# Patient Record
Sex: MALE | Race: WHITE | NOT HISPANIC OR LATINO | Employment: UNEMPLOYED | URBAN - METROPOLITAN AREA
[De-identification: names, ages, dates, MRNs, and addresses within clinical notes are randomized per-mention and may not be internally consistent; named-entity substitution may affect disease eponyms.]

---

## 2018-06-25 ENCOUNTER — OFFICE VISIT (OUTPATIENT)
Dept: URGENT CARE | Facility: CLINIC | Age: 43
End: 2018-06-25
Payer: COMMERCIAL

## 2018-06-25 VITALS
HEART RATE: 107 BPM | HEIGHT: 69 IN | OXYGEN SATURATION: 94 % | SYSTOLIC BLOOD PRESSURE: 140 MMHG | RESPIRATION RATE: 18 BRPM | BODY MASS INDEX: 28.32 KG/M2 | TEMPERATURE: 98.8 F | WEIGHT: 191.2 LBS | DIASTOLIC BLOOD PRESSURE: 78 MMHG

## 2018-06-25 DIAGNOSIS — H65.01 RIGHT ACUTE SEROUS OTITIS MEDIA, RECURRENCE NOT SPECIFIED: Primary | ICD-10-CM

## 2018-06-25 PROCEDURE — S9088 SERVICES PROVIDED IN URGENT: HCPCS | Performed by: PHYSICIAN ASSISTANT

## 2018-06-25 PROCEDURE — 99213 OFFICE O/P EST LOW 20 MIN: CPT | Performed by: PHYSICIAN ASSISTANT

## 2018-06-25 RX ORDER — TRAZODONE HYDROCHLORIDE 50 MG/1
50 TABLET ORAL
COMMUNITY
End: 2019-11-06

## 2018-06-25 RX ORDER — BENZONATATE 100 MG/1
100 CAPSULE ORAL 3 TIMES DAILY PRN
Qty: 30 CAPSULE | Refills: 0 | Status: SHIPPED | OUTPATIENT
Start: 2018-06-25 | End: 2019-05-10

## 2018-06-25 RX ORDER — AMOXICILLIN 500 MG/1
500 TABLET, FILM COATED ORAL 3 TIMES DAILY
Qty: 21 TABLET | Refills: 0 | Status: SHIPPED | OUTPATIENT
Start: 2018-06-25 | End: 2018-07-02

## 2018-06-25 RX ORDER — FLUTICASONE PROPIONATE 50 MCG
1 SPRAY, SUSPENSION (ML) NASAL DAILY
Qty: 16 G | Refills: 0 | Status: SHIPPED | OUTPATIENT
Start: 2018-06-25 | End: 2019-05-10

## 2018-06-25 NOTE — PROGRESS NOTES
[facility]        NAME: Kim Cabezas is a 36 y o  male  : 1975    MRN: [id number]  DATE: 2018  TIME: 9:25 AM    Assessment and Plan   Right acute serous otitis media, recurrence not specified [H65 01]  1  Right acute serous otitis media, recurrence not specified  benzonatate (TESSALON PERLES) 100 mg capsule    fluticasone (FLONASE) 50 mcg/act nasal spray    amoxicillin (AMOXIL) 500 MG tablet         Patient Instructions     Sudafed otc  Head down and aim out for nasal spray  Follow up with PCP in 3-5 days  Proceed to  ER if symptoms worsen  Chief Complaint     Chief Complaint   Patient presents with    Cough     Pt has cough and congestion x 3 days  Taking Claritin and Benndryl  History of Present Illness       36year-old male complains of 3 days of productive cough  He has right ear pain and sore throat  No fever but chills at home  No chest pain or shortness of breath  He does take Claritin and Benadryl over-the-counter for the symptoms with minimal help  Has not seen any other providers for this  No history of asthma or allergies  No smoking          Review of Systems   Review of Systems      Current Medications       Current Outpatient Prescriptions:     traZODone (DESYREL) 50 mg tablet, Take 50 mg by mouth daily at bedtime, Disp: , Rfl:     amoxicillin (AMOXIL) 500 MG tablet, Take 1 tablet (500 mg total) by mouth 3 (three) times a day for 7 days, Disp: 21 tablet, Rfl: 0    benzonatate (TESSALON PERLES) 100 mg capsule, Take 1 capsule (100 mg total) by mouth 3 (three) times a day as needed for cough, Disp: 30 capsule, Rfl: 0    fluticasone (FLONASE) 50 mcg/act nasal spray, 1 spray into each nostril daily, Disp: 16 g, Rfl: 0    Current Allergies     Allergies as of 2018    (No Known Allergies)            The following portions of the patient's history were reviewed and updated as appropriate: allergies, current medications, past family history, past medical history, past social history, past surgical history and problem list      Past Medical History:   Diagnosis Date    Infectious viral hepatitis        History reviewed  No pertinent surgical history  Family History   Problem Relation Age of Onset    Adopted: Yes    No Known Problems Mother     No Known Problems Father          Medications have been verified  Objective   /78 (BP Location: Left arm, Patient Position: Sitting)   Pulse (!) 107   Temp 98 8 °F (37 1 °C) (Temporal)   Resp 18   Ht 5' 9" (1 753 m)   Wt 86 7 kg (191 lb 3 2 oz)   SpO2 94%   BMI 28 24 kg/m²        Physical Exam     Physical Exam   Constitutional: He appears well-developed and well-nourished  No distress  HENT:   Right Ear: External ear and ear canal normal  Tympanic membrane is erythematous and bulging  A middle ear effusion is present  Left Ear: Tympanic membrane, external ear and ear canal normal  Tympanic membrane is not erythematous and not bulging  No middle ear effusion  Nose: Nose normal  Right sinus exhibits no maxillary sinus tenderness and no frontal sinus tenderness  Left sinus exhibits no maxillary sinus tenderness and no frontal sinus tenderness  Mouth/Throat: Posterior oropharyngeal erythema present  No oropharyngeal exudate, posterior oropharyngeal edema or tonsillar abscesses  Eyes: Conjunctivae and EOM are normal  Pupils are equal, round, and reactive to light  No scleral icterus  Neck: Normal range of motion  Neck supple  Cardiovascular: Normal rate, regular rhythm and normal heart sounds  Pulmonary/Chest: Effort normal and breath sounds normal  No respiratory distress  He has no wheezes  He has no rales  Abdominal: Soft  Bowel sounds are normal  He exhibits no distension and no mass  There is no tenderness  There is no rebound and no guarding  Lymphadenopathy:     He has no cervical adenopathy  Skin: Skin is warm and dry  No rash noted

## 2018-06-25 NOTE — PATIENT INSTRUCTIONS
Sudafed otc  Head down and aim out for nasal spray  Follow up with PCP in 3-5 days  Proceed to  ER if symptoms worsen

## 2019-05-10 ENCOUNTER — OFFICE VISIT (OUTPATIENT)
Dept: URGENT CARE | Facility: CLINIC | Age: 44
End: 2019-05-10
Payer: COMMERCIAL

## 2019-05-10 ENCOUNTER — APPOINTMENT (OUTPATIENT)
Dept: RADIOLOGY | Facility: CLINIC | Age: 44
End: 2019-05-10
Payer: COMMERCIAL

## 2019-05-10 VITALS
RESPIRATION RATE: 16 BRPM | WEIGHT: 192 LBS | OXYGEN SATURATION: 97 % | BODY MASS INDEX: 28.44 KG/M2 | HEIGHT: 69 IN | DIASTOLIC BLOOD PRESSURE: 80 MMHG | HEART RATE: 85 BPM | SYSTOLIC BLOOD PRESSURE: 148 MMHG | TEMPERATURE: 97.9 F

## 2019-05-10 DIAGNOSIS — M25.522 LEFT ELBOW PAIN: Primary | ICD-10-CM

## 2019-05-10 DIAGNOSIS — M25.522 LEFT ELBOW PAIN: ICD-10-CM

## 2019-05-10 PROCEDURE — 73070 X-RAY EXAM OF ELBOW: CPT

## 2019-05-10 PROCEDURE — 99213 OFFICE O/P EST LOW 20 MIN: CPT | Performed by: PHYSICIAN ASSISTANT

## 2019-05-10 PROCEDURE — S9088 SERVICES PROVIDED IN URGENT: HCPCS | Performed by: PHYSICIAN ASSISTANT

## 2019-05-10 RX ORDER — NAPROXEN 500 MG/1
500 TABLET ORAL 2 TIMES DAILY WITH MEALS
Qty: 14 TABLET | Refills: 0 | Status: SHIPPED | OUTPATIENT
Start: 2019-05-10 | End: 2019-11-14

## 2019-11-06 ENCOUNTER — OFFICE VISIT (OUTPATIENT)
Dept: FAMILY MEDICINE CLINIC | Facility: CLINIC | Age: 44
End: 2019-11-06
Payer: COMMERCIAL

## 2019-11-06 ENCOUNTER — TELEPHONE (OUTPATIENT)
Dept: FAMILY MEDICINE CLINIC | Facility: CLINIC | Age: 44
End: 2019-11-06

## 2019-11-06 VITALS
HEART RATE: 88 BPM | DIASTOLIC BLOOD PRESSURE: 66 MMHG | WEIGHT: 173 LBS | SYSTOLIC BLOOD PRESSURE: 116 MMHG | OXYGEN SATURATION: 98 % | BODY MASS INDEX: 25.62 KG/M2 | HEIGHT: 69 IN | RESPIRATION RATE: 18 BRPM

## 2019-11-06 DIAGNOSIS — L81.8 TATTOO OF SKIN: ICD-10-CM

## 2019-11-06 DIAGNOSIS — Z76.89 ENCOUNTER TO ESTABLISH CARE: Primary | ICD-10-CM

## 2019-11-06 DIAGNOSIS — F12.90 MARIJUANA USE: ICD-10-CM

## 2019-11-06 DIAGNOSIS — Z13.220 LIPID SCREENING: ICD-10-CM

## 2019-11-06 DIAGNOSIS — K62.5 RECTAL BLEEDING: ICD-10-CM

## 2019-11-06 DIAGNOSIS — R04.0 EPISTAXIS: ICD-10-CM

## 2019-11-06 DIAGNOSIS — F10.10 ALCOHOL ABUSE, DAILY USE: ICD-10-CM

## 2019-11-06 DIAGNOSIS — B19.20 HEPATITIS C VIRUS INFECTION WITHOUT HEPATIC COMA, UNSPECIFIED CHRONICITY: ICD-10-CM

## 2019-11-06 DIAGNOSIS — G89.29 CHRONIC BILATERAL LOW BACK PAIN WITH BILATERAL SCIATICA: ICD-10-CM

## 2019-11-06 DIAGNOSIS — M54.41 CHRONIC BILATERAL LOW BACK PAIN WITH BILATERAL SCIATICA: ICD-10-CM

## 2019-11-06 DIAGNOSIS — M54.42 CHRONIC BILATERAL LOW BACK PAIN WITH BILATERAL SCIATICA: ICD-10-CM

## 2019-11-06 DIAGNOSIS — F41.8 DEPRESSION WITH ANXIETY: ICD-10-CM

## 2019-11-06 PROCEDURE — 3008F BODY MASS INDEX DOCD: CPT | Performed by: NURSE PRACTITIONER

## 2019-11-06 PROCEDURE — 99204 OFFICE O/P NEW MOD 45 MIN: CPT | Performed by: NURSE PRACTITIONER

## 2019-11-06 RX ORDER — TRAZODONE HYDROCHLORIDE 150 MG/1
150 TABLET ORAL
Refills: 0 | COMMUNITY
Start: 2019-10-18

## 2019-11-06 RX ORDER — CLONAZEPAM 0.5 MG/1
0.5 TABLET ORAL DAILY PRN
Refills: 2 | COMMUNITY
Start: 2019-10-18

## 2019-11-06 NOTE — PATIENT INSTRUCTIONS
Establish visit  Prior Dx of Hep C- no tx  Obtain labs  Refer to GI  Rectal bleeding- Refer to DR [facility]  Avoid alcohol  Epistaxis- refer to ENT  Acute on chronic back pain- avoid nsaids such as advil, ibuprofen etc  May take extra strength tylenol  Advised to stop suboxone as given by a friend  Obtain labs  Out office will call with results  Follow up with [facility] as ordered  Follow up in this office in 2 months  Weight Management   AMBULATORY CARE:   Why it is important to manage your weight:  Being overweight increases your risk of health conditions such as heart disease, high blood pressure, type 2 diabetes, and certain types of cancer  It can also increase your risk for osteoarthritis, sleep apnea, and other respiratory problems  Aim for a slow, steady weight loss  Even a small amount of weight loss can lower your risk of health problems  How to lose weight safely:  A safe and healthy way to lose weight is to eat fewer calories and get regular exercise  You can lose up about 1 pound a week by decreasing the number of calories you eat by 500 calories each day  You can decrease calories by eating smaller portion sizes or by cutting out high-calorie foods  Read labels to find out how many calories are in the foods you eat  You can also burn calories with exercise such as walking, swimming, or biking  You will be more likely to keep weight off if you make these changes part of your lifestyle  Healthy meal plan for weight management:  A healthy meal plan includes a variety of foods, contains fewer calories, and helps you stay healthy  A healthy meal plan includes the following:  · Eat whole-grain foods more often  A healthy meal plan should contain fiber  Fiber is the part of grains, fruits, and vegetables that is not broken down by your body  Whole-grain foods are healthy and provide extra fiber in your diet   Some examples of whole-grain foods are whole-wheat breads and pastas, oatmeal, brown rice, and bulgur  · Eat a variety of vegetables every day  Include dark, leafy greens such as spinach, kale, jose greens, and mustard greens  Eat yellow and orange vegetables such as carrots, sweet potatoes, and winter squash  · Eat a variety of fruits every day  Choose fresh or canned fruit (canned in its own juice or light syrup) instead of juice  Fruit juice has very little or no fiber  · Eat low-fat dairy foods  Drink fat-free (skim) milk or 1% milk  Eat fat-free yogurt and low-fat cottage cheese  Try low-fat cheeses such as mozzarella and other reduced-fat cheeses  · Choose meat and other protein foods that are low in fat  Choose beans or other legumes such as split peas or lentils  Choose fish, skinless poultry (chicken or turkey), or lean cuts of red meat (beef or pork)  Before you cook meat or poultry, cut off any visible fat  · Use less fat and oil  Try baking foods instead of frying them  Add less fat, such as margarine, sour cream, regular salad dressing and mayonnaise to foods  Eat fewer high-fat foods  Some examples of high-fat foods include french fries, doughnuts, ice cream, and cakes  · Eat fewer sweets  Limit foods and drinks that are high in sugar  This includes candy, cookies, regular soda, and sweetened drinks  Ways to decrease calories:   · Eat smaller portions  ¨ Use a small plate with smaller servings  ¨ Do not eat second helpings  ¨ When you eat at a restaurant, ask for a box and place half of your meal in the box before you eat  ¨ Share an entrée with someone else  · Replace high-calorie snacks with healthy, low-calorie snacks  ¨ Choose fresh fruit, vegetables, fat-free rice cakes, or air-popped popcorn instead of potato chips, nuts, or chocolate  ¨ Choose water or calorie-free drinks instead of soda or sweetened drinks  · Eat regular meals  Skipping meals can lead to overeating later in the day   Eat a healthy snack in place of a meal if you do not have time to eat a regular meal      · Do not shop for groceries when you are hungry  You may be more likely to make unhealthy food choices  Take a grocery list of healthy foods and shop after you have eaten  Exercise:  Exercise at least 30 minutes per day on most days of the week  Some examples of exercise include walking, biking, dancing, and swimming  You can also fit in more physical activity by taking the stairs instead of the elevator or parking farther away from stores  Ask your healthcare provider about the best exercise plan for you  Other things to consider as you try to lose weight:   · Be aware of situations that may give you the urge to overeat, such as eating while watching television  Find ways to avoid these situations  For example, read a book, go for a walk, or do crafts  · Meet with a weight loss support group or friends who are also trying to lose weight  This may help you stay motivated to continue working on your weight loss goals  © 2017 [employer] Information is for End User's use only and may not be sold, redistributed or otherwise used for commercial purposes  All illustrations and images included in CareNotes® are the copyrighted property of [employer]  or [employer]  The above information is an  only  It is not intended as medical advice for individual conditions or treatments  Talk to your doctor, nurse or pharmacist before following any medical regimen to see if it is safe and effective for you  Low Fat Diet   AMBULATORY CARE:   A low-fat diet  is an eating plan that is low in total fat, unhealthy fat, and cholesterol  You may need to follow a low-fat diet if you have trouble digesting or absorbing fat  You may also need to follow this diet if you have high cholesterol  You can also lower your cholesterol by increasing the amount of fiber in your diet   Soluble fiber is a type of fiber that helps to decrease cholesterol levels  Different types of fat in food:   · Limit unhealthy fats  A diet that is high in cholesterol, saturated fat, and trans fat may cause unhealthy cholesterol levels  Unhealthy cholesterol levels increase your risk of heart disease  ¨ Cholesterol:  Limit intake of cholesterol to less than 200 mg per day  Cholesterol is found in meat, eggs, and dairy  ¨ Saturated fat:  Limit saturated fat to less than 7% of your total daily calories  Ask your dietitian how many calories you need each day  Saturated fat is found in butter, cheese, ice cream, whole milk, and palm oil  Saturated fat is also found in meat, such as beef, pork, chicken skin, and processed meats  Processed meats include sausage, hot dogs, and bologna  ¨ Trans fat:  Avoid trans fat as much as possible  Trans fat is used in fried and baked foods  Foods that say trans fat free on the label may still have up to 0 5 grams of trans fat per serving  · Include healthy fats  Replace foods that are high in saturated and trans fat with foods high in healthy fats  This may help to decrease high cholesterol levels  ¨ Monounsaturated fats: These are found in avocados, nuts, and vegetable oils, such as olive, canola, and sunflower oil  ¨ Polyunsaturated fats: These can be found in vegetable oils, such as soybean or corn oil  Omega-3 fats can help to decrease the risk of heart disease  Omega-3 fats are found in fish, such as salmon, herring, trout, and tuna  Omega-3 fats can also be found in plant foods, such as walnuts, flaxseed, soybeans, and canola oil    Foods to limit or avoid:   · Grains:      ¨ Snacks that are made with partially hydrogenated oils, such as chips, regular crackers, and butter-flavored popcorn    ¨ High-fat baked goods, such as biscuits, croissants, doughnuts, pies, cookies, and pastries    · Dairy:      ¨ Whole milk, 2% milk, and yogurt and ice cream made with whole milk    ¨ Half and half creamer, heavy cream, and whipping cream    ¨ Cheese, cream cheese, and sour cream    · Meats and proteins:      ¨ High-fat cuts of meat (T-bone steak, regular hamburger, and ribs)    ¨ Fried meat, poultry (turkey and chicken), and fish    ¨ Poultry (chicken and turkey) with skin    ¨ Cold cuts (salami or bologna), hot dogs, carmona, and sausage    ¨ Whole eggs and egg yolks    · Vegetables and fruits with added fat:      ¨ Fried vegetables or vegetables in butter or high-fat sauces, such as cream or cheese sauces    ¨ Fried fruit or fruit served with butter or cream    · Fats:      ¨ Butter, stick margarine, and shortening    ¨ Coconut, palm oil, and palm kernel oil  Foods to include:   · Grains:      ¨ Whole-grain breads, cereals, pasta, and brown rice    ¨ Low-fat crackers and pretzels    · Vegetables and fruits:      ¨ Fresh, frozen, or canned vegetables (no salt or low-sodium)    ¨ Fresh, frozen, dried, or canned fruit (canned in light syrup or fruit juice)    ¨ Avocado    · Low-fat dairy products:      ¨ Nonfat (skim) or 1% milk    ¨ Nonfat or low-fat cheese, yogurt, and cottage cheese    · Meats and proteins:      ¨ Chicken or turkey with no skin    ¨ Baked or broiled fish    ¨ Lean beef and pork (loin, round, extra lean hamburger)    ¨ Beans and peas, unsalted nuts, soy products    ¨ Egg whites and substitutes    ¨ Seeds and nuts    · Fats:      ¨ Unsaturated oil, such as canola, olive, peanut, soybean, or sunflower oil    ¨ Soft or liquid margarine and vegetable oil spread    ¨ Low-fat salad dressing  Other ways to decrease fat:   · Read food labels before you buy foods  Choose foods that have less than 30% of calories from fat  Choose low-fat or fat-free dairy products  Remember that fat free does not mean calorie free  These foods still contain calories, and too many calories can lead to weight gain  · Trim fat from meat and avoid fried food  Trim all visible fat from meat before you cook it  Remove the skin from poultry   Do not avendaño meat, fish, or poultry  Bake, roast, boil, or broil these foods instead  Avoid fried foods  Eat a baked potato instead of [place] fries  Steam vegetables instead of sautéing them in butter  · Add less fat to foods  Use imitation carmona bits on salads and baked potatoes instead of regular carmona bits  Use fat-free or low-fat salad dressings instead of regular dressings  Use low-fat or nonfat butter-flavored topping instead of regular butter or margarine on popcorn and other foods  Ways to decrease fat in recipes:  Replace high-fat ingredients with low-fat or nonfat ones  This may cause baked goods to be drier than usual  You may need to use nonfat cooking spray on pans to prevent food from sticking  You also may need to change the amount of other ingredients, such as water, in the recipe  Try the following:  · Use low-fat or light margarine instead of regular margarine or shortening  · Use lean ground turkey breast or chicken, or lean ground beef (less than 5% fat) instead of hamburger  · Add 1 teaspoon of canola oil to 8 ounces of skim milk instead of using cream or half and half  · Use grated zucchini, carrots, or apples in breads instead of coconut  · Use blenderized, low-fat cottage cheese, plain tofu, or low-fat ricotta cheese instead of cream cheese  · Use 1 egg white and 1 teaspoon of canola oil, or use ¼ cup (2 ounces) of fat-free egg substitute instead of a whole egg  · Replace half of the oil that is called for in a recipe with applesauce when you bake  Use 3 tablespoons of cocoa powder and 1 tablespoon of canola oil instead of a square of baking chocolate  How to increase fiber:  Eat enough high-fiber foods to get 20 to 30 grams of fiber every day  Slowly increase your fiber intake to avoid stomach cramps, gas, and other problems  · Eat 3 ounces of whole-grain foods each day  An ounce is about 1 slice of bread  Eat whole-grain breads, such as whole-wheat bread   Whole wheat, whole-wheat flour, or other whole grains should be listed as the first ingredient on the food label  Replace white flour with whole-grain flour or use half of each in recipes  Whole-grain flour is heavier than white flour, so you may have to add more yeast or baking powder  · Eat a high-fiber cereal for breakfast   Oatmeal is a good source of soluble fiber  Look for cereals that have bran or fiber in the name  Choose whole-grain products, such as brown rice, barley, and whole-wheat pasta  · Eat more beans, peas, and lentils  For example, add beans to soups or salads  Eat at least 5 cups of fruits and vegetables each day  Eat fruits and vegetables with the peel because the peel is high in fiber  © 2017 [employer] Information is for End User's use only and may not be sold, redistributed or otherwise used for commercial purposes  All illustrations and images included in CareNotes® are the copyrighted property of [employer]  or [employer]  The above information is an  only  It is not intended as medical advice for individual conditions or treatments  Talk to your doctor, nurse or pharmacist before following any medical regimen to see if it is safe and effective for you  Heart Healthy Diet   AMBULATORY CARE:   A heart healthy diet  is an eating plan low in total fat, unhealthy fats, and sodium (salt)  A heart healthy diet helps decrease your risk for heart disease and stroke  Limit the amount of fat you eat to 25% to 35% of your total daily calories  Limit sodium to less than 2,300 mg each day  Healthy fats:  Healthy fats can help improve cholesterol levels  The risk for heart disease is decreased when cholesterol levels are normal  Choose healthy fats, such as the following:  · Unsaturated fat  is found in foods such as soybean, canola, olive, corn, and safflower oils  It is also found in soft tub margarine that is made with liquid vegetable oil  · Omega-3 fat  is found in certain fish, such as salmon, tuna, and trout, and in walnuts and flaxseed  Unhealthy fats:  Unhealthy fats can cause unhealthy cholesterol levels in your blood and increase your risk of heart disease  Limit unhealthy fats, such as the following:  · Cholesterol  is found in animal foods, such as eggs and lobster, and in dairy products made from whole milk  Limit cholesterol to less than 300 milligrams (mg) each day  You may need to limit cholesterol to 200 mg each day if you have heart disease  · Saturated fat  is found in meats, such as carmona and hamburger  It is also found in chicken or turkey skin, whole milk, and butter  Limit saturated fat to less than 7% of your total daily calories  Limit saturated fat to less than 6% if you have heart disease or are at increased risk for it  · Trans fat  is found in packaged foods, such as potato chips and cookies  It is also in hard margarine, some fried foods, and shortening  Avoid trans fats as much as possible    Heart healthy foods and drinks to include:  Ask your dietitian or healthcare provider how many servings to have from each of the following food groups:  · Grains:      ¨ Whole-wheat breads, cereals, and pastas, and brown rice    ¨ Low-fat, low-sodium crackers and chips    · Vegetables:      ¨ Broccoli, green beans, green peas, and spinach    ¨ Collards, kale, and lima beans    ¨ Carrots, sweet potatoes, tomatoes, and peppers    ¨ Canned vegetables with no salt added    · Fruits:      ¨ Bananas, peaches, pears, and pineapple    ¨ Grapes, raisins, and dates    ¨ Oranges, tangerines, grapefruit, orange juice, and grapefruit juice    ¨ Apricots, mangoes, melons, and papaya    ¨ Raspberries and strawberries    ¨ Canned fruit with no added sugar    · Low-fat dairy products:      ¨ Nonfat (skim) milk, 1% milk, and low-fat almond, cashew, or soy milks fortified with calcium    ¨ Low-fat cheese, regular or frozen yogurt, and cottage cheese    · Meats and proteins , such as lean cuts of beef and pork (loin, leg, round), skinless chicken and turkey, legumes, soy products, egg whites, and nuts  Foods and drinks to limit or avoid:  Ask your dietitian or healthcare provider about these and other foods that are high in unhealthy fat, sodium, and sugar:  · Snack or packaged foods , such as frozen dinners, cookies, macaroni and cheese, and cereals with more than 300 mg of sodium per serving    · Canned or dry mixes  for cakes, soups, sauces, or gravies    · Vegetables with added sodium , such as instant potatoes, vegetables with added sauces, or regular canned vegetables    · Other foods high in sodium , such as ketchup, barbecue sauce, salad dressing, pickles, olives, soy sauce, and miso    · High-fat dairy foods  such as whole or 2% milk, cream cheese, or sour cream, and cheeses     · High-fat protein foods  such as high-fat cuts of beef (T-bone steaks, ribs), chicken or turkey with skin, and organ meats, such as liver    · Cured or smoked meats , such as hot dogs, carmona, and sausage    · Unhealthy fats and oils , such as butter, stick margarine, shortening, and cooking oils such as coconut or palm oil    · Food and drinks high in sugar , such as soft drinks (soda), sports drinks, sweetened tea, candy, cake, cookies, pies, and doughnuts  Other diet guidelines to follow:   · Eat more foods containing omega-3 fats  Eat fish high in omega-3 fats at least 2 times a week  · Limit alcohol  Too much alcohol can damage your heart and raise your blood pressure  Women should limit alcohol to 1 drink a day  Men should limit alcohol to 2 drinks a day  A drink of alcohol is 12 ounces of beer, 5 ounces of wine, or 1½ ounces of liquor  · Choose low-sodium foods  High-sodium foods can lead to high blood pressure  Add little or no salt to food you prepare  Use herbs and spices in place of salt  · Eat more fiber  to help lower cholesterol levels   Eat at least 5 servings of fruits and vegetables each day  Eat 3 ounces of whole-grain foods each day  Legumes (beans) are also a good source of fiber  Lifestyle guidelines:   · Do not smoke  Nicotine and other chemicals in cigarettes and cigars can cause lung and heart damage  Ask your healthcare provider for information if you currently smoke and need help to quit  E-cigarettes or smokeless tobacco still contain nicotine  Talk to your healthcare provider before you use these products  · Exercise regularly  to help you maintain a healthy weight and improve your blood pressure and cholesterol levels  Ask your healthcare provider about the best exercise plan for you  Do not start an exercise program without asking your healthcare provider  Follow up with your healthcare provider as directed:  Write down your questions so you remember to ask them during your visits  © 2017 [employer] Information is for End User's use only and may not be sold, redistributed or otherwise used for commercial purposes  All illustrations and images included in CareNotes® are the copyrighted property of [employer]  or [employer]  The above information is an  only  It is not intended as medical advice for individual conditions or treatments  Talk to your doctor, nurse or pharmacist before following any medical regimen to see if it is safe and effective for you  Calorie Counting Diet   WHAT YOU NEED TO KNOW:   What is a calorie counting diet? It is a meal plan based on counting calories each day to reach a healthy body weight  You will need to eat fewer calories if you are trying to lose weight  Weight loss may decrease your risk for certain health problems or improve your health if you have health problems  Some of these health problems include heart disease, high blood pressure, and diabetes  What foods should I avoid?   Your dietitian will tell you if you need to avoid certain foods based on your body weight and health condition  You may need to avoid high-fat foods if you are at risk for or have heart disease  You may need to eat fewer foods from the breads and starches food group if you have diabetes  How many calories are in foods? The following is a list of foods and drinks with the approximate number of calories in each  Check the food label to find the exact number of calories  A dietitian can tell you how many calories you should have from each food group each day    · Carbohydrate:      ¨ ½ of a 3-inch bagel, 1 slice of bread, or ½ of a hamburger bun or hot dog bun (80)    ¨ 1 (8-inch) flour tortilla or ½ cup of cooked rice (100)    ¨ 1 (6-inch) corn tortilla (80)    ¨ 1 (6-inch) pancake or 1 cup of bran flakes cereal (110)    ¨ ½ cup of cooked cereal (80)    ¨ ½ cup of cooked pasta (85)    ¨ 1 ounce of pretzels (100)    ¨ 3 cups of air-popped popcorn without butter or oil (80)    · Dairy:      ¨ 1 cup of skim or 1% milk (90)    ¨ 1 cup of 2% milk (120)    ¨ 1 cup of whole milk (160)    ¨ 1 cup of 2% chocolate milk (220)    ¨ 1 ounce of low-fat cheese with 3 grams of fat per ounce (70)    ¨ 1 ounce of cheddar cheese (114)    ¨ ½ cup of 1% fat cottage cheese (80)    ¨ 1 cup of plain or sugar-free, fat-free yogurt (90)    · Protein foods:      ¨ 3 ounces of fish (not breaded or fried) (95)    ¨ 3 ounces of breaded, fried fish (195)    ¨ ¾ cup of tuna canned in water (105)    ¨ 3 ounces of chicken breast without skin (105)    ¨ 1 fried chicken breast with skin (350)    ¨ ¼ cup of fat free egg substitute (40)    ¨ 1 large egg (75)    ¨ 3 ounces of lean beef or pork (165)    ¨ 3 ounces of fried pork chop or ham (185)    ¨ ½ cup of cooked dried beans, such as kidney, parks, lentils, or navy (115)    ¨ 3 ounces of bologna or lunch meat (225)    ¨ 2 links of breakfast sausage (140)    · Vegetables:      ¨ ½ cup of sliced mushrooms (10)    ¨ 1 cup of salad greens, such as lettuce, spinach, or chandana (15)    ¨ ½ cup of steamed asparagus (20)    ¨ ½ cup of cooked summer squash, zucchini squash, or green or wax beans (25)    ¨ 1 cup of broccoli or cauliflower florets, or 1 medium tomato (25)    ¨ 1 large raw carrot or ½ cup of cooked carrots (40)    ¨ ? of a medium cucumber or 1 stalk of celery (5)    ¨ 1 small baked potato (160)    ¨ 1 cup of breaded, fried vegetables (230)    · Fruit:      ¨ 1 (6-inch) banana (55)     ¨ ½ of a 4-inch grapefruit (55)    ¨ 15 grapes (60)    ¨ 1 medium orange or apple (70)    ¨ 1 large peach (65)    ¨ 1 cup of fresh pineapple chunks (75)    ¨ 1 cup of melon cubes (50)    ¨ 1¼ cups of whole strawberries (45)    ¨ ½ cup of fruit canned in juice (55)    ¨ ½ cup of fruit canned in heavy syrup (110)    ¨ ?  cup of raisins (130)    ¨ ½ cup of unsweetened fruit juice (60)    ¨ ½ cup of grape, cranberry, or prune juice (90)    · Fat:      ¨ 10 peanuts or 2 teaspoons of peanut butter (55)    ¨ 2 tablespoons of avocado or 1 tablespoon of regular salad dressing (45)    ¨ 2 slices of carmona (90)    ¨ 1 teaspoon of oil, such as safflower, canola, corn, or olive oil (45)    ¨ 2 teaspoons of low-fat margarine, or 1 tablespoon of low-fat mayonnaise (50)    ¨ 1 teaspoon of regular margarine (40)    ¨ 1 tablespoon of regular mayonnaise (135)    ¨ 1 tablespoon of cream cheese or 2 tablespoons of low-fat cream cheese (45)    ¨ 2 tablespoons of vegetable shortening (215)    · Dessert and sweets:      ¨ 8 animal crackers or 5 vanilla wafers (80)    ¨ 1 frozen fruit juice bar (80)    ¨ ½ cup of ice milk or low-fat frozen yogurt (90)    ¨ ½ cup of sherbet or sorbet (125)    ¨ ½ cup of sugar-free pudding or custard (60)    ¨ ½ cup of ice cream (140)    ¨ ½ cup of pudding or custard (175)    ¨ 1 (2-inch) square chocolate brownie (185)    · Combination foods:      ¨ Bean burrito made with an 8-inch tortilla, without cheese (275)    ¨ Chicken breast sandwich with lettuce and tomato (325)    ¨ 1 cup of chicken noodle soup (60)    ¨ 1 beef taco (175)    ¨ Regular hamburger with lettuce and tomato (310)    ¨ Regular cheeseburger with lettuce and tomato (410)     ¨ ¼ of a 12-inch cheese pizza (280)    ¨ Fried fish sandwich with lettuce and tomato (425)    ¨ Hot dog and bun (275)    ¨ 1½ cups of macaroni and cheese (310)    ¨ Taco salad with a fried tortilla shell (870)    · Low-calorie foods:      ¨ 1 tablespoon of ketchup or 1 tablespoon of fat free sour cream (15)    ¨ 1 teaspoon of mustard (5)    ¨ ¼ cup of salsa (20)    ¨ 1 large dill pickle (15)    ¨ 1 tablespoon of fat free salad dressing (10)    ¨ 2 teaspoons of low-sugar, light jam or jelly, or 1 tablespoon of sugar-free syrup (15)    ¨ 1 sugar-free popsicle (15)    ¨ 1 cup of club soda, seltzer water, or diet soda (0)  CARE AGREEMENT:   You have the right to help plan your care  Discuss treatment options with your caregivers to decide what care you want to receive  You always have the right to refuse treatment  The above information is an  only  It is not intended as medical advice for individual conditions or treatments  Talk to your doctor, nurse or pharmacist before following any medical regimen to see if it is safe and effective for you  © 2017 [employer] Information is for End User's use only and may not be sold, redistributed or otherwise used for commercial purposes  All illustrations and images included in CareNotes® are the copyrighted property of [employer]  or Evan Short

## 2019-11-14 ENCOUNTER — OFFICE VISIT (OUTPATIENT)
Dept: GASTROENTEROLOGY | Facility: CLINIC | Age: 44
End: 2019-11-14
Payer: COMMERCIAL

## 2019-11-14 VITALS
BODY MASS INDEX: 26.81 KG/M2 | HEART RATE: 97 BPM | WEIGHT: 181 LBS | DIASTOLIC BLOOD PRESSURE: 72 MMHG | RESPIRATION RATE: 18 BRPM | HEIGHT: 69 IN | SYSTOLIC BLOOD PRESSURE: 130 MMHG

## 2019-11-14 DIAGNOSIS — B19.20 HEPATITIS C VIRUS INFECTION WITHOUT HEPATIC COMA, UNSPECIFIED CHRONICITY: ICD-10-CM

## 2019-11-14 DIAGNOSIS — K62.5 RECTAL BLEEDING: ICD-10-CM

## 2019-11-14 DIAGNOSIS — R11.0 NAUSEA: Primary | ICD-10-CM

## 2019-11-14 DIAGNOSIS — R10.9 ABDOMINAL PAIN, UNSPECIFIED ABDOMINAL LOCATION: ICD-10-CM

## 2019-11-14 DIAGNOSIS — R63.4 WEIGHT LOSS: ICD-10-CM

## 2019-11-14 PROCEDURE — 99203 OFFICE O/P NEW LOW 30 MIN: CPT | Performed by: PHYSICIAN ASSISTANT

## 2019-11-14 RX ORDER — PANTOPRAZOLE SODIUM 40 MG/1
40 TABLET, DELAYED RELEASE ORAL DAILY
Qty: 30 TABLET | Refills: 2 | Status: SHIPPED | OUTPATIENT
Start: 2019-11-14 | End: 2020-02-19 | Stop reason: SDUPTHER

## 2019-11-14 NOTE — PROGRESS NOTES
[facility] - Outpatient Consultation  Aliyah Mendoza 38 y o  male MRN: [id number]  Encounter: [id number]          ASSESSMENT AND PLAN:      1  Hepatitis C virus infection without hepatic coma    Patient reports a history of Hep C - he has never been treated  He reports a prior history of IV Heroin which he says he stopped years ago  However, he continues to drink excess ETOH with about 4-5 drinks a day and uses Suboxone regularly illegally as it is not prescribed by a physician  No labs available for review  Will check CBC/CMP/Hep C RNA Quant and genotype, Hep B serology and HIV as well as HCV fibrosure  Check abdominal ultrasound  Discussed potential HCV treatment with the patient  He needs to stop excess ETOH use and the illegal drug use before he would be a candidate (would then need negative drug testing as well to confirm)  He understands the importance of this  2  Nausea  3  Abdominal pain  4  Rectal bleeding  5  Weight loss    Patient reports issues with nausea, abdominal pain, intermittent rectal bleeding, and a weight loss of 20 lbs  EGD and colonoscopy to investigate (discussed that he cannot not use illegal drugs or be intoxicated in order to safely undergo these procedures)  ETOH cessation recommended  Check abdominal ultrasound  Will begin a PPI course with Pantoprazole 40mg po daily for 6-8 weeks  Would need CT A/P to further evaluate if work up negative and continued weight loss  Follow up in 4 weeks  ______________________________________________________________    HPI:  Patient is a 65-year-old male who presents to the office for an evaluation of his hepatitis C and also multiple gastrointestinal complaints  Patient reports he was diagnosed with hepatitis-C years ago  He has never been treated  He has not had any recent blood work  He reports he previously used to use IV heroin    He reports he was incarcerated previously and released about 2 years ago  He reports that he is using Suboxone currently illegally as he receives it from a friend  He also reports that he still consumes approximately 4-5 beers a day although this is decreased from what he previously used to drink which was up to half a case a day  He reports that he has been struggling with mostly left upper quadrant pain and nausea  He also reports that he has seen intermittent rectal bleeding  Additionally, he reports that he has lost 20 lb over the past few months  He has never had an EGD or colonoscopy  He denies frequent NSAIDs  REVIEW OF SYSTEMS:    CONSTITUTIONAL: Denies any fever, chills, rigors, and weight loss  HEENT: No earache or tinnitus  Denies hearing loss or visual disturbances  CARDIOVASCULAR: No chest pain or palpitations  RESPIRATORY: Denies any cough, hemoptysis, shortness of breath or dyspnea on exertion  GASTROINTESTINAL: As noted in the History of Present Illness  GENITOURINARY: No problems with urination  Denies any hematuria or dysuria  NEUROLOGIC: No dizziness or vertigo, denies headaches  MUSCULOSKELETAL: Denies any muscle or joint pain  SKIN: Denies skin rashes or itching  ENDOCRINE: Denies excessive thirst  Denies intolerance to heat or cold  PSYCHOSOCIAL: Denies depression or anxiety  Denies any recent memory loss  Historical Information   Past Medical History:   Diagnosis Date    Alcohol abuse, daily use 11/6/2019    Anxiety     Depression     Hepatitis C     Hepatitis C infection     Infectious viral hepatitis     Insomnia     [facility]      History reviewed  No pertinent surgical history    Social History   Social History     Substance and Sexual Activity   Alcohol Use Yes    Alcohol/week: 28 0 standard drinks    Types: 28 Cans of beer per week    Drinks per session: 3 or 4    Binge frequency: Daily or almost daily     Social History     Substance and Sexual Activity   Drug Use Yes    Types: Marijuana     Social History     Tobacco Use   Smoking Status Never Smoker   Smokeless Tobacco Current User    Types: Chew     Family History   Adopted: Yes   Problem Relation Age of Onset    No Known Problems Mother     No Known Problems Father        Meds/Allergies       Current Outpatient Medications:     clonazePAM (KlonoPIN) 0 5 mg tablet    traZODone (DESYREL) 150 mg tablet    pantoprazole (PROTONIX) 40 mg tablet    Allergies   Allergen Reactions    Haldol [Haloperidol]            Objective     Blood pressure 130/72, pulse 97, resp  rate 18, height 5' 9" (1 753 m), weight 82 1 kg (181 lb)  Body mass index is 26 73 kg/m²  PHYSICAL EXAM:      General Appearance:   Alert, cooperative, no distress, poorly kept   HEENT:   Normocephalic, atraumatic   Neck:  Supple, symmetrical, trachea midline   Lungs:   Clear to auscultation bilaterally; no rales, rhonchi or wheezing; respirations unlabored    Heart[de-identified]   Regular rate and rhythm; no murmur, rub, or gallop  Abdomen:   Soft, non-tender, non-distended; normal bowel sounds; no masses, no organomegaly    Genitalia:   Deferred    Rectal:   Deferred    Extremities:  No cyanosis, clubbing or edema    Pulses:  2+ and symmetric    Skin:  No jaundice, rashes, or lesions    Lymph nodes:  No palpable cervical lymphadenopathy        Lab Results:   No visits with results within 1 Day(s) from this visit  Latest known visit with results is:   No results found for any previous visit  Radiology Results:   No results found

## 2019-11-16 ENCOUNTER — LAB (OUTPATIENT)
Dept: LAB | Facility: CLINIC | Age: 44
End: 2019-11-16
Payer: COMMERCIAL

## 2019-11-16 ENCOUNTER — APPOINTMENT (OUTPATIENT)
Dept: RADIOLOGY | Facility: CLINIC | Age: 44
End: 2019-11-16
Payer: COMMERCIAL

## 2019-11-16 DIAGNOSIS — R73.9 HYPERGLYCEMIA: Primary | ICD-10-CM

## 2019-11-16 DIAGNOSIS — R04.0 EPISTAXIS: ICD-10-CM

## 2019-11-16 DIAGNOSIS — K62.5 RECTAL BLEEDING: ICD-10-CM

## 2019-11-16 DIAGNOSIS — M54.42 CHRONIC BILATERAL LOW BACK PAIN WITH BILATERAL SCIATICA: ICD-10-CM

## 2019-11-16 DIAGNOSIS — Z76.89 ENCOUNTER TO ESTABLISH CARE: ICD-10-CM

## 2019-11-16 DIAGNOSIS — G89.29 CHRONIC BILATERAL LOW BACK PAIN WITH BILATERAL SCIATICA: ICD-10-CM

## 2019-11-16 DIAGNOSIS — B19.20 HEPATITIS C VIRUS INFECTION WITHOUT HEPATIC COMA, UNSPECIFIED CHRONICITY: ICD-10-CM

## 2019-11-16 DIAGNOSIS — Z13.220 LIPID SCREENING: ICD-10-CM

## 2019-11-16 DIAGNOSIS — L81.8 TATTOO OF SKIN: ICD-10-CM

## 2019-11-16 DIAGNOSIS — M54.41 CHRONIC BILATERAL LOW BACK PAIN WITH BILATERAL SCIATICA: ICD-10-CM

## 2019-11-16 LAB
ALBUMIN SERPL BCP-MCNC: 3.9 G/DL (ref 3.5–5)
ALP SERPL-CCNC: 90 U/L (ref 46–116)
ALT SERPL W P-5'-P-CCNC: 176 U/L (ref 12–78)
ANION GAP SERPL CALCULATED.3IONS-SCNC: 8 MMOL/L (ref 4–13)
AST SERPL W P-5'-P-CCNC: 173 U/L (ref 5–45)
BASOPHILS # BLD AUTO: 0.03 THOUSANDS/ΜL (ref 0–0.1)
BASOPHILS NFR BLD AUTO: 1 % (ref 0–1)
BILIRUB SERPL-MCNC: 1.18 MG/DL (ref 0.2–1)
BUN SERPL-MCNC: 9 MG/DL (ref 5–25)
CALCIUM SERPL-MCNC: 9 MG/DL (ref 8.3–10.1)
CHLORIDE SERPL-SCNC: 108 MMOL/L (ref 100–108)
CHOLEST SERPL-MCNC: 150 MG/DL (ref 50–200)
CO2 SERPL-SCNC: 24 MMOL/L (ref 21–32)
CREAT SERPL-MCNC: 0.76 MG/DL (ref 0.6–1.3)
EOSINOPHIL # BLD AUTO: 0.19 THOUSAND/ΜL (ref 0–0.61)
EOSINOPHIL NFR BLD AUTO: 4 % (ref 0–6)
ERYTHROCYTE [DISTWIDTH] IN BLOOD BY AUTOMATED COUNT: 13.1 % (ref 11.6–15.1)
FERRITIN SERPL-MCNC: 434 NG/ML (ref 8–388)
GFR SERPL CREATININE-BSD FRML MDRD: 110 ML/MIN/1.73SQ M
GLUCOSE P FAST SERPL-MCNC: 156 MG/DL (ref 65–99)
HCT VFR BLD AUTO: 46.4 % (ref 36.5–49.3)
HDLC SERPL-MCNC: 40 MG/DL
HGB BLD-MCNC: 15.3 G/DL (ref 12–17)
IMM GRANULOCYTES # BLD AUTO: 0 THOUSAND/UL (ref 0–0.2)
IMM GRANULOCYTES NFR BLD AUTO: 0 % (ref 0–2)
INR PPP: 1.23 (ref 0.84–1.19)
IRON SATN MFR SERPL: 21 %
IRON SERPL-MCNC: 68 UG/DL (ref 65–175)
LDLC SERPL CALC-MCNC: 80 MG/DL (ref 0–100)
LYMPHOCYTES # BLD AUTO: 0.9 THOUSANDS/ΜL (ref 0.6–4.47)
LYMPHOCYTES NFR BLD AUTO: 20 % (ref 14–44)
MCH RBC QN AUTO: 30.5 PG (ref 26.8–34.3)
MCHC RBC AUTO-ENTMCNC: 33 G/DL (ref 31.4–37.4)
MCV RBC AUTO: 93 FL (ref 82–98)
MONOCYTES # BLD AUTO: 0.58 THOUSAND/ΜL (ref 0.17–1.22)
MONOCYTES NFR BLD AUTO: 13 % (ref 4–12)
NEUTROPHILS # BLD AUTO: 2.91 THOUSANDS/ΜL (ref 1.85–7.62)
NEUTS SEG NFR BLD AUTO: 62 % (ref 43–75)
NONHDLC SERPL-MCNC: 110 MG/DL
NRBC BLD AUTO-RTO: 0 /100 WBCS
PLATELET # BLD AUTO: 151 THOUSANDS/UL (ref 149–390)
PMV BLD AUTO: 10.2 FL (ref 8.9–12.7)
POTASSIUM SERPL-SCNC: 3.5 MMOL/L (ref 3.5–5.3)
PROT SERPL-MCNC: 7.9 G/DL (ref 6.4–8.2)
PROTHROMBIN TIME: 15.1 SECONDS (ref 11.6–14.5)
RBC # BLD AUTO: 5.01 MILLION/UL (ref 3.88–5.62)
SODIUM SERPL-SCNC: 140 MMOL/L (ref 136–145)
TIBC SERPL-MCNC: 321 UG/DL (ref 250–450)
TRIGL SERPL-MCNC: 151 MG/DL
WBC # BLD AUTO: 4.61 THOUSAND/UL (ref 4.31–10.16)

## 2019-11-16 PROCEDURE — 83540 ASSAY OF IRON: CPT

## 2019-11-16 PROCEDURE — 86592 SYPHILIS TEST NON-TREP QUAL: CPT

## 2019-11-16 PROCEDURE — 85610 PROTHROMBIN TIME: CPT

## 2019-11-16 PROCEDURE — 87536 HIV-1 QUANT&REVRSE TRNSCRPJ: CPT

## 2019-11-16 PROCEDURE — 83550 IRON BINDING TEST: CPT

## 2019-11-16 PROCEDURE — 83036 HEMOGLOBIN GLYCOSYLATED A1C: CPT

## 2019-11-16 PROCEDURE — 83883 ASSAY NEPHELOMETRY NOT SPEC: CPT

## 2019-11-16 PROCEDURE — 82728 ASSAY OF FERRITIN: CPT

## 2019-11-16 PROCEDURE — 80053 COMPREHEN METABOLIC PANEL: CPT

## 2019-11-16 PROCEDURE — 36415 COLL VENOUS BLD VENIPUNCTURE: CPT

## 2019-11-16 PROCEDURE — 85025 COMPLETE CBC W/AUTO DIFF WBC: CPT

## 2019-11-16 PROCEDURE — 80074 ACUTE HEPATITIS PANEL: CPT

## 2019-11-16 PROCEDURE — 82247 BILIRUBIN TOTAL: CPT

## 2019-11-16 PROCEDURE — 87902 NFCT AGT GNTYP ALYS HEP C: CPT

## 2019-11-16 PROCEDURE — 84460 ALANINE AMINO (ALT) (SGPT): CPT

## 2019-11-16 PROCEDURE — 82977 ASSAY OF GGT: CPT

## 2019-11-16 PROCEDURE — 80061 LIPID PANEL: CPT

## 2019-11-16 PROCEDURE — 87522 HEPATITIS C REVRS TRNSCRPJ: CPT

## 2019-11-16 PROCEDURE — 72110 X-RAY EXAM L-2 SPINE 4/>VWS: CPT

## 2019-11-16 PROCEDURE — 83010 ASSAY OF HAPTOGLOBIN QUANT: CPT

## 2019-11-16 PROCEDURE — 86704 HEP B CORE ANTIBODY TOTAL: CPT

## 2019-11-16 PROCEDURE — 87529 HSV DNA AMP PROBE: CPT

## 2019-11-16 PROCEDURE — 82172 ASSAY OF APOLIPOPROTEIN: CPT

## 2019-11-16 PROCEDURE — 86706 HEP B SURFACE ANTIBODY: CPT

## 2019-11-17 LAB
HAV IGM SER QL: ABNORMAL
HBV CORE AB SER QL: ABNORMAL
HBV CORE IGM SER QL: ABNORMAL
HBV CORE IGM SER QL: ABNORMAL
HBV SURFACE AB SER-ACNC: <3.1 MIU/ML
HBV SURFACE AG SER QL: ABNORMAL
HBV SURFACE AG SER QL: ABNORMAL
HCV AB SER QL: ABNORMAL
HCV AB SER QL: ABNORMAL

## 2019-11-18 ENCOUNTER — TELEPHONE (OUTPATIENT)
Dept: FAMILY MEDICINE CLINIC | Facility: CLINIC | Age: 44
End: 2019-11-18

## 2019-11-18 LAB
EST. AVERAGE GLUCOSE BLD GHB EST-MCNC: 88 MG/DL
HBA1C MFR BLD: 4.7 % (ref 4.2–6.3)
HIV 1+2 AB+HIV1 P24 AG SERPL QL IA: NORMAL
RPR SER QL: NORMAL

## 2019-11-20 LAB
A2 MACROGLOB SERPL-MCNC: 271 MG/DL (ref 110–276)
ALT SERPL W P-5'-P-CCNC: 174 IU/L (ref 0–55)
APO A-I SERPL-MCNC: 134 MG/DL (ref 101–178)
BILIRUB SERPL-MCNC: 0.5 MG/DL (ref 0–1.2)
COMMENT: ABNORMAL
FIBROSIS SCORING:: ABNORMAL
FIBROSIS STAGE SERPL QL: ABNORMAL
GGT SERPL-CCNC: 183 IU/L (ref 0–65)
HAPTOGLOB SERPL-MCNC: 13 MG/DL (ref 34–200)
HIV1 RNA # SERPL NAA+PROBE: <20 COPIES/ML
HIV1 RNA SERPL NAA+PROBE-LOG#: NORMAL LOG10COPY/ML
HSV1 DNA SPEC QL NAA+PROBE: NEGATIVE
HSV2 DNA SPEC QL NAA+PROBE: NEGATIVE
INTERPRETATIONS: ABNORMAL
LIVER FIBR SCORE SERPL CALC.FIBROSURE: 0.82 (ref 0–0.21)
NECROINFLAMM ACTIVITY SCORING:: ABNORMAL
NECROINFLAMMATORY ACT GRADE SERPL QL: ABNORMAL
NECROINFLAMMATORY ACT SCORE SERPL: 0.88 (ref 0–0.17)
SERVICE CMNT-IMP: ABNORMAL

## 2019-11-21 LAB
HCV RNA SERPL NAA+PROBE-ACNC: NORMAL IU/ML
HCV RNA SERPL NAA+PROBE-LOG IU: 5.98 LOG10 IU/ML
TEST INFORMATION: NORMAL

## 2019-11-22 LAB
HCV GENTYP SERPL NAA+PROBE: 3
HCV PLEASE NOTE: NORMAL

## 2019-11-25 DIAGNOSIS — M62.838 MUSCLE SPASM: Primary | ICD-10-CM

## 2019-11-25 DIAGNOSIS — M54.41 CHRONIC BILATERAL LOW BACK PAIN WITH BILATERAL SCIATICA: ICD-10-CM

## 2019-11-25 DIAGNOSIS — G89.29 CHRONIC BILATERAL LOW BACK PAIN WITH BILATERAL SCIATICA: ICD-10-CM

## 2019-11-25 DIAGNOSIS — M54.42 CHRONIC BILATERAL LOW BACK PAIN WITH BILATERAL SCIATICA: ICD-10-CM

## 2019-11-25 RX ORDER — METHOCARBAMOL 500 MG/1
500 TABLET, FILM COATED ORAL 4 TIMES DAILY
Qty: 40 TABLET | Refills: 0 | Status: SHIPPED | OUTPATIENT
Start: 2019-11-25 | End: 2020-01-07

## 2019-11-25 RX ORDER — IBUPROFEN 600 MG/1
600 TABLET ORAL EVERY 6 HOURS PRN
Qty: 90 TABLET | Refills: 0 | Status: SHIPPED | OUTPATIENT
Start: 2019-11-25 | End: 2019-12-10 | Stop reason: SDUPTHER

## 2019-12-06 ENCOUNTER — TELEPHONE (OUTPATIENT)
Dept: GASTROENTEROLOGY | Facility: CLINIC | Age: 44
End: 2019-12-06

## 2019-12-10 DIAGNOSIS — M54.41 CHRONIC BILATERAL LOW BACK PAIN WITH BILATERAL SCIATICA: ICD-10-CM

## 2019-12-10 DIAGNOSIS — G89.29 CHRONIC BILATERAL LOW BACK PAIN WITH BILATERAL SCIATICA: ICD-10-CM

## 2019-12-10 DIAGNOSIS — M54.42 CHRONIC BILATERAL LOW BACK PAIN WITH BILATERAL SCIATICA: ICD-10-CM

## 2019-12-10 RX ORDER — IBUPROFEN 600 MG/1
600 TABLET ORAL EVERY 6 HOURS PRN
Qty: 90 TABLET | Refills: 0 | Status: SHIPPED | OUTPATIENT
Start: 2019-12-10 | End: 2020-01-07

## 2019-12-14 ENCOUNTER — ANESTHESIA EVENT (OUTPATIENT)
Dept: GASTROENTEROLOGY | Facility: HOSPITAL | Age: 44
End: 2019-12-14

## 2019-12-14 ENCOUNTER — HOSPITAL ENCOUNTER (OUTPATIENT)
Dept: GASTROENTEROLOGY | Facility: HOSPITAL | Age: 44
Setting detail: OUTPATIENT SURGERY
Discharge: HOME | End: 2019-12-14
Attending: INTERNAL MEDICINE | Admitting: INTERNAL MEDICINE
Payer: COMMERCIAL

## 2019-12-14 ENCOUNTER — ANESTHESIA (OUTPATIENT)
Dept: GASTROENTEROLOGY | Facility: HOSPITAL | Age: 44
End: 2019-12-14

## 2019-12-14 VITALS
SYSTOLIC BLOOD PRESSURE: 115 MMHG | BODY MASS INDEX: 25.21 KG/M2 | TEMPERATURE: 97.8 F | OXYGEN SATURATION: 96 % | HEART RATE: 74 BPM | RESPIRATION RATE: 18 BRPM | WEIGHT: 170.19 LBS | DIASTOLIC BLOOD PRESSURE: 74 MMHG | HEIGHT: 69 IN

## 2019-12-14 DIAGNOSIS — R11.0 NAUSEA: ICD-10-CM

## 2019-12-14 DIAGNOSIS — K62.5 RECTAL BLEEDING: ICD-10-CM

## 2019-12-14 DIAGNOSIS — R63.4 WEIGHT LOSS: ICD-10-CM

## 2019-12-14 DIAGNOSIS — R10.9 ABDOMINAL PAIN, UNSPECIFIED ABDOMINAL LOCATION: ICD-10-CM

## 2019-12-14 DIAGNOSIS — B19.20 HEPATITIS C VIRUS INFECTION WITHOUT HEPATIC COMA, UNSPECIFIED CHRONICITY: ICD-10-CM

## 2019-12-14 PROCEDURE — NC001 PR NO CHARGE: Performed by: INTERNAL MEDICINE

## 2019-12-14 PROCEDURE — 43239 EGD BIOPSY SINGLE/MULTIPLE: CPT | Performed by: INTERNAL MEDICINE

## 2019-12-14 PROCEDURE — 88305 TISSUE EXAM BY PATHOLOGIST: CPT | Performed by: PATHOLOGY

## 2019-12-14 PROCEDURE — 45380 COLONOSCOPY AND BIOPSY: CPT | Performed by: INTERNAL MEDICINE

## 2019-12-14 RX ORDER — SODIUM CHLORIDE, SODIUM LACTATE, POTASSIUM CHLORIDE, CALCIUM CHLORIDE 600; 310; 30; 20 MG/100ML; MG/100ML; MG/100ML; MG/100ML
INJECTION, SOLUTION INTRAVENOUS CONTINUOUS PRN
Status: DISCONTINUED | OUTPATIENT
Start: 2019-12-14 | End: 2019-12-14 | Stop reason: SURG

## 2019-12-14 RX ORDER — LIDOCAINE HYDROCHLORIDE 10 MG/ML
INJECTION, SOLUTION EPIDURAL; INFILTRATION; INTRACAUDAL; PERINEURAL AS NEEDED
Status: DISCONTINUED | OUTPATIENT
Start: 2019-12-14 | End: 2019-12-14 | Stop reason: SURG

## 2019-12-14 RX ORDER — PROPOFOL 10 MG/ML
INJECTION, EMULSION INTRAVENOUS AS NEEDED
Status: DISCONTINUED | OUTPATIENT
Start: 2019-12-14 | End: 2019-12-14 | Stop reason: SURG

## 2019-12-14 RX ORDER — SODIUM CHLORIDE, SODIUM LACTATE, POTASSIUM CHLORIDE, CALCIUM CHLORIDE 600; 310; 30; 20 MG/100ML; MG/100ML; MG/100ML; MG/100ML
75 INJECTION, SOLUTION INTRAVENOUS CONTINUOUS
Status: CANCELLED | OUTPATIENT
Start: 2019-12-14

## 2019-12-14 RX ADMIN — PROPOFOL 20 MG: 10 INJECTION, EMULSION INTRAVENOUS at 09:23

## 2019-12-14 RX ADMIN — LIDOCAINE HYDROCHLORIDE 50 MG: 10 INJECTION, SOLUTION EPIDURAL; INFILTRATION; INTRACAUDAL; PERINEURAL at 09:10

## 2019-12-14 RX ADMIN — SODIUM CHLORIDE, SODIUM LACTATE, POTASSIUM CHLORIDE, AND CALCIUM CHLORIDE: .6; .31; .03; .02 INJECTION, SOLUTION INTRAVENOUS at 09:05

## 2019-12-14 RX ADMIN — PROPOFOL 100 MG: 10 INJECTION, EMULSION INTRAVENOUS at 09:10

## 2019-12-14 RX ADMIN — PROPOFOL 20 MG: 10 INJECTION, EMULSION INTRAVENOUS at 09:16

## 2019-12-14 RX ADMIN — PROPOFOL 10 MG: 10 INJECTION, EMULSION INTRAVENOUS at 09:18

## 2019-12-14 RX ADMIN — PROPOFOL 40 MG: 10 INJECTION, EMULSION INTRAVENOUS at 09:20

## 2019-12-14 RX ADMIN — PROPOFOL 100 MG: 10 INJECTION, EMULSION INTRAVENOUS at 09:11

## 2019-12-14 RX ADMIN — PROPOFOL 10 MG: 10 INJECTION, EMULSION INTRAVENOUS at 09:26

## 2019-12-14 RX ADMIN — PROPOFOL 30 MG: 10 INJECTION, EMULSION INTRAVENOUS at 09:14

## 2019-12-14 NOTE — INTERVAL H&P NOTE
H&P reviewed  After examining the patient I find no changes in the patients condition since the H&P had been written      Vitals:    12/14/19 0813   BP: 103/60   Pulse: 72   Resp: 19   Temp: 97 9 °F (36 6 °C)   SpO2: 97%

## 2019-12-14 NOTE — PERIOPERATIVE NURSING NOTE
Instructed patient to go to an Urgent Care facility to have his middle left finger checked  He cut it with a circular saw a few days ago  No bleeding noted but wound is red and edematous  Educated patient and wife on the risk of infection

## 2019-12-14 NOTE — H&P
History and Physical - [facility]  María Jones 38 y o  male MRN: [id number]                  HPI: María Jones is a 38y o  year old male who presents for EGD and colonoscopy for nausea, abdominal pain, weight loss, rectal bleeding  No prior history of colonoscopy      REVIEW OF SYSTEMS: Per the HPI, and otherwise unremarkable  Historical Information   Past Medical History:   Diagnosis Date    Alcohol abuse, daily use 11/6/2019    Anxiety     Depression     Hepatitis C     Hepatitis C infection     Infectious viral hepatitis     Insomnia     Personality disorder (HCC)      No past surgical history on file  Social History   Social History     Substance and Sexual Activity   Alcohol Use Yes    Alcohol/week: 28 0 standard drinks    Types: 28 Cans of beer per week    Drinks per session: 3 or 4    Binge frequency: Daily or almost daily     Social History     Substance and Sexual Activity   Drug Use Yes    Types: Marijuana     Social History     Tobacco Use   Smoking Status Never Smoker   Smokeless Tobacco Current User    Types: Chew     Family History   Adopted: Yes   Problem Relation Age of Onset    No Known Problems Mother     No Known Problems Father        Meds/Allergies       (Not in a hospital admission)    Allergies   Allergen Reactions    Haldol [Haloperidol]        Objective     There were no vitals taken for this visit        PHYSICAL EXAM    Gen: NAD  CV: RRR  CHEST: Clear  ABD: soft, NT/ND  EXT: no edema  Neuro: AAO      ASSESSMENT/PLAN:  This is a 38y o  year old male here for nausea, abdominal pain diffuse, rectal bleeding, weight loss    PLAN:   Procedure:  EGD and colonoscopy

## 2019-12-14 NOTE — ANESTHESIA POSTPROCEDURE EVALUATION
Post-Op Assessment Note    CV Status:  Stable  Pain Score: 0    Pain management: adequate     Mental Status:  Sleepy   Hydration Status:  Stable   PONV Controlled:  None   Airway Patency:  Patent and adequate   Post Op Vitals Reviewed: Yes      Staff: CRNA           BP   102/54   Temp   97 8   Pulse  74   Resp   16   SpO2   95

## 2019-12-18 ENCOUNTER — HOSPITAL ENCOUNTER (OUTPATIENT)
Dept: ULTRASOUND IMAGING | Facility: HOSPITAL | Age: 44
Discharge: HOME | End: 2019-12-18
Payer: COMMERCIAL

## 2019-12-18 DIAGNOSIS — B19.20 HEPATITIS C VIRUS INFECTION WITHOUT HEPATIC COMA, UNSPECIFIED CHRONICITY: ICD-10-CM

## 2019-12-18 DIAGNOSIS — R11.0 NAUSEA: ICD-10-CM

## 2019-12-18 PROCEDURE — 76700 US EXAM ABDOM COMPLETE: CPT

## 2019-12-23 ENCOUNTER — TELEPHONE (OUTPATIENT)
Dept: PALLIATIVE MEDICINE | Facility: CLINIC | Age: 44
End: 2019-12-23

## 2019-12-31 ENCOUNTER — TELEPHONE (OUTPATIENT)
Dept: GASTROENTEROLOGY | Facility: CLINIC | Age: 44
End: 2019-12-31

## 2020-01-02 NOTE — PROGRESS NOTES
Assessment/Plan:       Diagnoses and all orders for this visit:    Hepatitis C virus infection without hepatic coma, unspecified chronicity  -     Ambulatory referral to [facility]; Future    Chronic bilateral low back pain with bilateral sciatica    Depression with anxiety    Alcohol abuse, daily use    Marijuana use  -     Ambulatory referral to [facility]; Future        No problem-specific Assessment & Plan notes found for this encounter  Subjective:      Patient ID: Annette Sharp is a 38 y o  male  Patient is here for follow up  He underwent EGD and colonoscopy by Dr Henry Boo 3 weeks ago for nausea, vomitting and weight loss  On : underwent cauterization of right nare by ENT for nosebleeds  He has had a nosebleed every day since that time  He was dx with Hep C and did see GI but unless he quits marijuana, no treatment can be given  Liver enzymes were elevated     He would like referral for marijuana treatment     Results for Raya Oliveira (MRN [id number]) as of 2020 17:13    2019 07:12  Sodium: 140  Potassium: 3 5  Chloride: 108  CO2: 24  Anion Gap: 8  BUN: 9  Creatinine: 0 76  GLUCOSE FASTIN (H)  Calcium: 9 0  AST: 173 (H)  ALT: 176 (H)  Alkaline Phosphatase: 90  Total Protein: 7 9  Albumin: 3 9  TOTAL BILIRUBIN: 1 18 (H)  eGFR: 110  Cholesterol: 150  Triglycerides: 151 (H)  HDL: 40  Non-HDL Cholesterol: 110  LDL Direct: 80  Iron: 68  Ferritin: 434 (H)  Iron Saturation: 21  TIBC: 321  WBC: 4 61  Red Blood Cell Count: 5 01  Hemoglobin: 15 3  HCT: 46 4  MCV: 93  MCH: 30 5  MCHC: 33 0  RDW: 13 1  Platelet Count: 748  MPV: 10 2  nRBC: 0  Neutrophils %: 62  Immat GRANS %: 0  Lymphocytes Relative: 20  Monocytes Relative: 13 (H)  Eosinophils: 4  Basophils Relative: 1  Immature Grans Absolute: 0 00  Absolute Neutrophils: 2 91  Lymphocytes Absolute: 0 90  Absolute Monocytes: 0 58  Absolute Eosinophils: 0 19  Basophils Absolute: 0 03  Protime: 15 1 (H)  INR: 1 23 (H)  Hemoglobin A1C: 4 7  EA  RPR SCREEN: Non-Reactive  HSV 1, PCR: Negative  HSV 2 , PCR: Negative  HEPATITIS A IGM ANTIBODY: Non-reactive  HEPATITIS B SURFACE ANTIGEN: Non-reactive  HEPATITIS B SURFACE ANTIBODY: <3 10  HEPATITIS B CORE TOTAL ANTIBODY: Non-reactive  HEPATITIS B CORE IGM ANTIBODY: Non-reactive  HEPATITIS C ANTIBODY: High Reactive (A)  HEPATITIS C GENOTYPE: 3  HCV QUANTITATIVE   HCV PLEASE NOTE: Comment  TEST INFORMATION: Comment  HIV-1 RNA Viral Load Log: COMMENT  HIV-1/2 AB-AG: Non-Reactive  HIV-1 RNA BY PCR, QN: <20        The following portions of the patient's history were reviewed and updated as appropriate:   He has a past medical history of Alcohol abuse, daily use (2019), Anxiety, Depression, Hepatitis C, Hepatitis C infection, Infectious viral hepatitis, Insomnia, and Personality disorder ([facility] )  ,  does not have any pertinent problems on file  ,   has no past surgical history on file  ,  family history includes No Known Problems in his father and mother  He was adopted  ,   reports that he has never smoked  His smokeless tobacco use includes chew  He reports that he drinks about 28 0 standard drinks of alcohol per week  He reports that he has current or past drug history  Drug: Marijuana  Frequency: 7 00 times per week  ,  is allergic to haldol [haloperidol]     Current Outpatient Medications   Medication Sig Dispense Refill    clonazePAM (KlonoPIN) 0 5 mg tablet Take 0 5 mg by mouth daily as needed  2    pantoprazole (PROTONIX) 40 mg tablet Take 1 tablet (40 mg total) by mouth daily 30 tablet 2    traZODone (DESYREL) 150 mg tablet TAKE 1/2-1AT BEDTIME AS NEEDED FOR INSOMNIA  0     No current facility-administered medications for this visit  Review of Systems   Constitutional: Negative for fatigue and fever  HENT: Positive for nosebleeds  Negative for congestion  Eyes: Negative for visual disturbance  Respiratory: Negative for cough and shortness of breath  Cardiovascular: Negative for chest pain, palpitations and leg swelling  Gastrointestinal: Positive for abdominal pain and vomiting  Negative for abdominal distention  Endocrine: Negative for cold intolerance, polydipsia and polyuria  Genitourinary: Negative for difficulty urinating  Musculoskeletal: Negative for back pain and joint swelling  Skin: Negative for color change and rash  Allergic/Immunologic: Negative for immunocompromised state  Neurological: Negative for dizziness and headaches  Hematological: Negative for adenopathy  Psychiatric/Behavioral: Negative for behavioral problems and sleep disturbance  All other systems reviewed and are negative  Objective:  Vitals:    01/07/20 1701   BP: 120/78   BP Location: Left arm   Patient Position: Sitting   Pulse: 78   Resp: 18   Temp: 98 °F (36 7 °C)   SpO2: (!) 9%   Weight: 79 8 kg (176 lb)   Height: 5' 9" (1 753 m)     Body mass index is 25 99 kg/m²  Physical Exam   Constitutional: He is oriented to person, place, and time  He appears well-developed and well-nourished  No distress  HENT:   Head: Normocephalic and atraumatic  Right Ear: External ear normal    Left Ear: External ear normal    Nose: Nose normal    Mouth/Throat: Oropharynx is clear and moist  No oropharyngeal exudate  Eyes: Pupils are equal, round, and reactive to light  Conjunctivae and EOM are normal  Right eye exhibits no discharge  Left eye exhibits no discharge  No scleral icterus  Neck: Normal range of motion  Neck supple  No JVD present  No thyromegaly present  Cardiovascular: Normal rate, regular rhythm, normal heart sounds and intact distal pulses  Exam reveals no gallop and no friction rub  No murmur heard  Pulmonary/Chest: Effort normal and breath sounds normal  No respiratory distress  Abdominal: Soft  Bowel sounds are normal  He exhibits no distension  There is tenderness     LUQ tenderness to palpation   Musculoskeletal: Normal range of motion  He exhibits no edema or tenderness  Lymphadenopathy:     He has no cervical adenopathy  Neurological: He is alert and oriented to person, place, and time  He has normal reflexes  No cranial nerve deficit  Coordination normal    Skin: Skin is warm and dry  He is not diaphoretic  Psychiatric: He has a normal mood and affect  His behavior is normal  Judgment and thought content normal    Nursing note and vitals reviewed

## 2020-01-07 ENCOUNTER — OFFICE VISIT (OUTPATIENT)
Dept: FAMILY MEDICINE CLINIC | Facility: CLINIC | Age: 45
End: 2020-01-07
Payer: COMMERCIAL

## 2020-01-07 VITALS
RESPIRATION RATE: 18 BRPM | BODY MASS INDEX: 26.07 KG/M2 | SYSTOLIC BLOOD PRESSURE: 120 MMHG | DIASTOLIC BLOOD PRESSURE: 78 MMHG | TEMPERATURE: 98 F | WEIGHT: 176 LBS | HEART RATE: 78 BPM | HEIGHT: 69 IN | OXYGEN SATURATION: 9 %

## 2020-01-07 DIAGNOSIS — F10.10 ALCOHOL ABUSE, DAILY USE: ICD-10-CM

## 2020-01-07 DIAGNOSIS — M54.41 CHRONIC BILATERAL LOW BACK PAIN WITH BILATERAL SCIATICA: ICD-10-CM

## 2020-01-07 DIAGNOSIS — G89.29 CHRONIC BILATERAL LOW BACK PAIN WITH BILATERAL SCIATICA: ICD-10-CM

## 2020-01-07 DIAGNOSIS — F41.8 DEPRESSION WITH ANXIETY: ICD-10-CM

## 2020-01-07 DIAGNOSIS — F12.90 MARIJUANA USE: ICD-10-CM

## 2020-01-07 DIAGNOSIS — M54.42 CHRONIC BILATERAL LOW BACK PAIN WITH BILATERAL SCIATICA: ICD-10-CM

## 2020-01-07 DIAGNOSIS — B19.20 HEPATITIS C VIRUS INFECTION WITHOUT HEPATIC COMA, UNSPECIFIED CHRONICITY: Primary | ICD-10-CM

## 2020-01-07 PROCEDURE — 3008F BODY MASS INDEX DOCD: CPT | Performed by: NURSE PRACTITIONER

## 2020-01-07 PROCEDURE — 1036F TOBACCO NON-USER: CPT | Performed by: NURSE PRACTITIONER

## 2020-01-07 PROCEDURE — 99214 OFFICE O/P EST MOD 30 MIN: CPT | Performed by: NURSE PRACTITIONER

## 2020-01-07 NOTE — PATIENT INSTRUCTIONS
US showed mild cirrhosis  Mildly enlarged spleen  Epistaxis- s/p cauterization by ENT  Referral given to Dr Fidel Ledbetter for medical marijuana  Chronic back pain- OK to take 200-400 mg ibuprofen daily  Cirrhosis   WHAT YOU NEED TO KNOW:   Cirrhosis is long-term scarring of the liver  The liver makes enzymes and bile that help digest food and gives your body energy  It also removes harmful material from your body, such as alcohol and other chemicals  Cirrhosis is caused by repeated damage to your liver over time  Scar tissue starts to replace healthy liver tissue  The scar tissue prevents the liver from working properly  DISCHARGE INSTRUCTIONS:   Return to the emergency department if:   · You have pain during a bowel movement and it is black or contains blood  · You have a fast heart rate and fast breathing  · You are dizzy or confused  · You have severe pain in your abdomen  · You have trouble breathing  · Your vomit looks like it has coffee grinds or blood in it  Contact your healthcare provider if:   · You have a fever  · You have red or itchy skin  · You are in pain and feel weak  · You have questions or concerns about your condition or care  Medicines: You may need medicine to treat the cause of your cirrhosis  You may also need medicine to treat any health problems caused by cirrhosis  · Antiviral medicine  may be needed if your cirrhosis is caused by hepatitis  Antiviral medicine may prevent or decrease swelling and damage to your liver  · Blood pressure medicine  is used to treat high blood pressure in the portal vein (the vein that goes to your liver)  · Diuretics  decrease extra fluid that collects in a part of your body, such as your legs and abdomen  Diuretics can also decrease your blood pressure  You will urinate more often when you take this medicine  · Antibiotics  help prevent or treat a bacterial infection  · Take your medicine as directed    Contact your healthcare provider if you think your medicine is not helping or if you have side effects  Tell him or her if you are allergic to any medicine  Keep a list of the medicines, vitamins, and herbs you take  Include the amounts, and when and why you take them  Bring the list or the pill bottles to follow-up visits  Carry your medicine list with you in case of an emergency  Do not drink alcohol:  Alcohol will cause more damage to your liver  Manage cirrhosis:   · Do not smoke  Nicotine and other chemicals in cigarettes and cigars can cause blood vessel and lung damage  Ask your healthcare provider for information if you currently smoke and need help to quit  E-cigarettes or smokeless tobacco still contain nicotine  Talk to your healthcare provider before you use these products  · Eat a variety of healthy foods  Healthy foods include fruits, vegetables, whole-grain breads, low-fat dairy products, beans, lean meat, and fish  Ask if you need to be on a special diet  · Reach or maintain a healthy weight  You may develop fatty liver disease if you are overweight  Ask your healthcare provider for a healthy weight for you  He can help you create a safe weight loss plan if you are overweight  · Limit sodium (salt)  You may need to decrease the amount of sodium you eat if you have swelling caused by fluid buildup  Sodium is found in table salt and salty foods such as canned foods, frozen foods, and potato chips  · Drink liquids as directed  Ask how much liquid to drink each day and which liquids are best for you  For most people, good liquids to drink are water, juice, and milk  Liquids can help your liver work better  · Ask about vaccines  You may have a hard time fighting infection because of cirrhosis  Vaccines help protect you against viruses that can cause diseases such as the flu or hepatitis  Viral hepatitis is caused by a virus that leads to inflammation of the liver   You may need a hepatitis A or B vaccine  You may also need a pneumonia vaccine  Always get a flu vaccine each year as soon as it becomes available  · Ask about medicines  Some medicines can harm your liver  Acetaminophen is an example  Talk to your healthcare provider about all your medicines  Do not take any over-the-counter medicine or herbal supplements until your healthcare provider says it is okay  Follow up with your healthcare provider as directed:  Write down your questions so you remember to ask them during your visits  © 2017 [employer] Information is for End User's use only and may not be sold, redistributed or otherwise used for commercial purposes  All illustrations and images included in CareNotes® are the copyrighted property of [employer]  or Evan Han  The above information is an  only  It is not intended as medical advice for individual conditions or treatments  Talk to your doctor, nurse or pharmacist before following any medical regimen to see if it is safe and effective for you  Results for [place] (MRN [id number]) as of 1/7/2020 17:13   Ref   Range 11/16/2019 07:12   Sodium Latest Ref Range: 136 - 145 mmol/L 140   Potassium Latest Ref Range: 3 5 - 5 3 mmol/L 3 5   Chloride Latest Ref Range: 100 - 108 mmol/L 108   CO2 Latest Ref Range: 21 - 32 mmol/L 24   Anion Gap Latest Ref Range: 4 - 13 mmol/L 8   BUN Latest Ref Range: 5 - 25 mg/dL 9   Creatinine Latest Ref Range: 0 60 - 1 30 mg/dL 0 76   GLUCOSE FASTING Latest Ref Range: 65 - 99 mg/dL 156 (H)   Calcium Latest Ref Range: 8 3 - 10 1 mg/dL 9 0   AST Latest Ref Range: 5 - 45 U/L 173 (H)   ALT Latest Ref Range: 12 - 78 U/L 176 (H)   Alkaline Phosphatase Latest Ref Range: 46 - 116 U/L 90   Total Protein Latest Ref Range: 6 4 - 8 2 g/dL 7 9   Albumin Latest Ref Range: 3 5 - 5 0 g/dL 3 9   TOTAL BILI Latest Ref Range: 0 0 - 1 2 mg/dL    TOTAL BILIRUBIN Latest Ref Range: 0 20 - 1 00 mg/dL 1 18 (H) eGFR Latest Units: ml/min/1 73sq m 110   GGT, POC Latest Ref Range: 0 - 65 IU/L    Cholesterol Latest Ref Range: 50 - 200 mg/dL 150   Triglycerides Latest Ref Range: <=150 mg/dL 151 (H)   HDL Latest Ref Range: >=40 mg/dL 40   Non-HDL Cholesterol Latest Units: mg/dl 110   LDL Direct Latest Ref Range: 0 - 100 mg/dL 80   APOLIPOPROTEIN A-1 Latest Ref Range: 101 - 178 mg/dL    Iron Latest Ref Range: 65 - 175 ug/dL 68   Ferritin Latest Ref Range: 8 - 388 ng/mL 434 (H)   Iron Saturation Latest Units: % 21   TIBC Latest Ref Range: 250 - 450 ug/dL 321   WBC Latest Ref Range: 4 31 - 10 16 Thousand/uL 4 61   Red Blood Cell Count Latest Ref Range: 3 88 - 5 62 Million/uL 5 01   Hemoglobin Latest Ref Range: 12 0 - 17 0 g/dL 15 3   HCT Latest Ref Range: 36 5 - 49 3 % 46 4   MCV Latest Ref Range: 82 - 98 fL 93   MCH Latest Ref Range: 26 8 - 34 3 pg 30 5   MCHC Latest Ref Range: 31 4 - 37 4 g/dL 33 0   RDW Latest Ref Range: 11 6 - 15 1 % 13 1   Platelet Count Latest Ref Range: 149 - 390 Thousands/uL 151   MPV Latest Ref Range: 8 9 - 12 7 fL 10 2   nRBC Latest Units: /100 WBCs 0   Neutrophils % Latest Ref Range: 43 - 75 % 62   Immat GRANS % Latest Ref Range: 0 - 2 % 0   Lymphocytes Relative Latest Ref Range: 14 - 44 % 20   Monocytes Relative Latest Ref Range: 4 - 12 % 13 (H)   Eosinophils Latest Ref Range: 0 - 6 % 4   Basophils Relative Latest Ref Range: 0 - 1 % 1   Immature Grans Absolute Latest Ref Range: 0 00 - 0 20 Thousand/uL 0 00   Absolute Neutrophils Latest Ref Range: 1 85 - 7 62 Thousands/µL 2 91   Lymphocytes Absolute Latest Ref Range: 0 60 - 4 47 Thousands/µL 0 90   Absolute Monocytes Latest Ref Range: 0 17 - 1 22 Thousand/µL 0 58   Absolute Eosinophils Latest Ref Range: 0 00 - 0 61 Thousand/µL 0 19   Basophils Absolute Latest Ref Range: 0 00 - 0 10 Thousands/µL 0 03   HAPTOGLOBIN Latest Ref Range: 34 - 200 mg/dL    Protime Latest Ref Range: 11 6 - 14 5 seconds 15 1 (H)   INR Latest Ref Range: 0 84 - 1 19  1 23 (H) Hemoglobin A1C Latest Ref Range: 4 2 - 6 3 % 4 7   EAG Latest Units: mg/dl 88   RPR SCREEN Latest Ref Range: Non-Reactive  Non-Reactive   HSV 1, PCR Latest Ref Range: Negative  Negative   HSV 2 , PCR Latest Ref Range: Negative  Negative   HEPATITIS A IGM ANTIBODY Latest Ref Range: Non-reactive, Equivocal-Suggest Recollect  Non-reactive   HEPATITIS B SURFACE ANTIGEN Latest Ref Range: Non-reactive, NonReactive - Confirmed  Non-reactive   HEPATITIS B SURFACE ANTIBODY Latest Units: mIU/mL <3 10   HEPATITIS B CORE TOTAL ANTIBODY Latest Ref Range: Non-reactive  Non-reactive   HEPATITIS B CORE IGM ANTIBODY Latest Ref Range: Non-reactive  Non-reactive   HEPATITIS C ANTIBODY Latest Ref Range: Non-reactive  High Reactive (A)   HEPATITIS C GENOTYPE Unknown 3   HCV QUANTITATIVE LOG Latest Units: log10 IU/mL 5 980   HCV PLEASE NOTE Unknown Comment   TEST INFORMATION Unknown Comment   FIBROSIS SCORE Latest Ref Range: 0 00 - 0 21     FIBROSIS STAGE Unknown    Necroinflammat Activity Score Latest Ref Range: 0 00 - 0 17     Necroinflammat Activity Grade Unknown    NECROINFLAMM ACTIVITY SCORING Unknown    ALPHA 2 MACROGLOBULIN Latest Ref Range: 110 - 276 mg/dL    Limitations: Unknown    HIV-1 RNA Viral Load Log Latest Units: [id number]/mL COMMENT   HIV-1/2 AB-AG Latest Ref Range: Non-Reactive  Non-Reactive   HIV-1 RNA BY PCR, QN Latest Units: copies/mL <20   Comment Unknown    INTERPRETATIONS Unknown

## 2020-02-19 DIAGNOSIS — R11.0 NAUSEA: ICD-10-CM

## 2020-02-19 RX ORDER — PANTOPRAZOLE SODIUM 40 MG/1
TABLET, DELAYED RELEASE ORAL
Qty: 30 TABLET | Refills: 2 | Status: SHIPPED | OUTPATIENT
Start: 2020-02-19 | End: 2022-01-11 | Stop reason: SDUPTHER

## 2020-03-05 NOTE — PROGRESS NOTES
Spoke to patient by phone as he updated me on his situation:   He reports he is going to see a specialist regarding being prescribed medical marijuana  He stopped the illegal Suboxone that he was getting from his friend and the ETOH  He is feeling much better now and gaining weight  He is going to call after seeing the specialist and taking care of a few other things to schedule a follow up and start the Hep C treatment (probably the beginning of the summer)

## 2021-08-09 NOTE — ANESTHESIA PREPROCEDURE EVALUATION
Hospitalist Discharge Summary     Patient ID:  Suresh Crabtree  [id number]  39 y.o.  1935  7/29/2021    PCP on record: Taj Lowe MD    Admit date: 7/29/2021  Discharge date and time: 8/9/2021    DISCHARGE DIAGNOSIS:    CLL  Normocytic anemia  Thrombocytopenia  Asymptomatic Bradycardia       CONSULTATIONS:  IP CONSULT TO HOSPITALIST  IP CONSULT TO HEMATOLOGY    Excerpted HPI from H&P of Pao Miller MD:  CHIEF COMPLAINT: abnormal Lab      HISTORY OF PRESENT ILLNESS:     79years old male with past medical history significant for skin cancer was transferred from [facility] for evaluation of low platelet level and low hemoglobin, patient was referred to ED by family doctor, patient complaining from weight loss, patient denies any fever chills denies any abdominal pain denies any nausea and vomiting, blood work in ED was significant for low hemoglobin 6.1, platelet 3, white blood cell count 19.2.     We were asked to admit for work up and evaluation of the above problems.                    ______________________________________________________________________  DISCHARGE SUMMARY/HOSPITAL COURSE:  for full details see H&P, daily progress notes, labs, consult notes. Manisha Vargas y. o.M was admitted to [facility] on 7/29/2021 and treated for the following medical complaints:     CLL  Normocytic anemia  Thrombocytopenia     S/p bone marrow biopsy on 8/2, showing B cell lypmphoproliferative disorder  Oncology following  Transfuse for Hgb < 7.0, transfuse platelet if <33  S/p Dexamethasone 40 mg po daily for 4 days, completed. IVIG 500 mg/kg (30 g) daily x 3 days completed.   Bone marrow biopsy showed B cell lymphoproliferative disorder  S/p Bendamustine and Ritumixab  Getting Granix as per oncology now, 3 doses, completing today  If hb/platelet better and stable, plan to discharge in AM     Bradycardia  Not on any meds causing this  HR in 60s today  EKG show sinus bradycardia, echo normal EF     Hard Review of Systems/Medical History  Patient summary reviewed  Chart reviewed  No history of anesthetic complications     Cardiovascular  Negative cardio ROS No past MI , No CAD , No history of CABG, No cardiac stents  No history of percutaneous transluminal coronary angioplasty, No dysrhythmias , No angina , No orthopnea, No PND, No GAGE,    Pulmonary  Smoker chewing tobacco use  , No shortness of breath, No recent URI ,        GI/Hepatic    Liver disease , Hepatitis C, Bowel prep            Endo/Other  Negative endo/other ROS      GYN       Hematology  Negative hematology ROS      Musculoskeletal    Arthritis     Neurology  Negative neurology ROS   No TIA, No CVA , No motor deficit ,    Psychology   Anxiety, Depression ,   Chronic opioid dependence   Comment: Used illicitly obtained suboxone until one week ago (approx 2 mg per day)         Physical Exam    Airway    Mallampati score: II  TM Distance: >3 FB  Neck ROM: full     Dental   No notable dental hx     Cardiovascular  Comment: Negative ROS, Rhythm: regular, Rate: normal,     Pulmonary  Pulmonary exam normal     Other Findings        Anesthesia Plan  ASA Score- 3     Anesthesia Type- IV sedation with anesthesia with ASA Monitors  Additional Monitors:   Airway Plan:         Plan Factors-    Induction- intravenous  Postoperative Plan-     Informed Consent- Anesthetic plan and risks discussed with patient  I personally reviewed this patient with the CRNA  Discussed and agreed on the Anesthesia Plan with the CRNA  Omega [place] of hearing    Pt to be discharged and follow up with [facility] infusion center for CBC and infusions . Pt aware of plan and will follow with Hematology OP. Patient's plan of care has been reviewed with them. Patient and/or family have verbally conveyed their understanding and agreement of the patient's signs, symptoms, diagnosis, treatment and prognosis and additionally agree to follow up as recommended or return to [facility] should their condition change prior to follow-up. Discharge instructions have also been provided to the patient with some educational information regarding their diagnosis as well a list of reasons why they would want to return to the office prior to their follow-up appointment should their condition change.       _______________________________________________________________________  Patient seen and examined by me on discharge day. Pertinent Findings:  Gen:    Not in distress  Chest: Clear lungs  CVS:   Regular rhythm. No edema  Abd:  Soft, not distended, not tender  Neuro:  Alert, Oriented x 4   _______________________________________________________________________  DISCHARGE MEDICATIONS:   Current Discharge Medication List            Patient Follow Up Instructions: Activity: Activity as tolerated  Diet: Resume previous diet  Wound Care: None needed    Follow-up with PCP in 1 week.   Follow-up tests/labs CBC    Follow-up Information     Follow up With Specialties Details Why Shell Rodriguez MD Hematology and Oncology On 8/11/2021 Appointment time 1:00 pm [address]  [address] [place] [address]  [phone]      Obie BANKS MD Internal Medicine Go on 8/13/2021 2:30PM [address] [facility]  [phone]          ________________________________________________________________    Risk of deterioration: Low    Condition at Discharge:  Stable  __________________________________________________________________    Disposition  Home with family and home health services    ____________________________________________________________________    Code Status: Full Code  ___________________________________________________________________      Total time in minutes spent coordinating this discharge (includes going over instructions, follow-up, prescriptions, and preparing report for sign off to her PCP) :  35 minutes    Signed:  Marlene Paiz NP

## 2021-08-21 ENCOUNTER — OFFICE VISIT (OUTPATIENT)
Dept: URGENT CARE | Facility: CLINIC | Age: 46
End: 2021-08-21
Payer: COMMERCIAL

## 2021-08-21 VITALS
BODY MASS INDEX: 27.17 KG/M2 | HEART RATE: 77 BPM | OXYGEN SATURATION: 97 % | RESPIRATION RATE: 16 BRPM | DIASTOLIC BLOOD PRESSURE: 78 MMHG | TEMPERATURE: 99.2 F | WEIGHT: 184 LBS | SYSTOLIC BLOOD PRESSURE: 136 MMHG

## 2021-08-21 DIAGNOSIS — S05.02XA ABRASION OF LEFT CORNEA, INITIAL ENCOUNTER: ICD-10-CM

## 2021-08-21 DIAGNOSIS — M79.89 SWELLING OF RIGHT HAND: Primary | ICD-10-CM

## 2021-08-21 PROCEDURE — S9088 SERVICES PROVIDED IN URGENT: HCPCS | Performed by: PHYSICIAN ASSISTANT

## 2021-08-21 PROCEDURE — 99214 OFFICE O/P EST MOD 30 MIN: CPT | Performed by: PHYSICIAN ASSISTANT

## 2021-08-21 RX ORDER — PREDNISONE 10 MG/1
TABLET ORAL
Qty: 21 TABLET | Refills: 0 | Status: SHIPPED | OUTPATIENT
Start: 2021-08-21

## 2021-08-21 RX ORDER — OFLOXACIN 3 MG/ML
1 SOLUTION/ DROPS OPHTHALMIC 4 TIMES DAILY
Qty: 5 ML | Refills: 0 | Status: SHIPPED | OUTPATIENT
Start: 2021-08-21 | End: 2021-08-28

## 2021-08-21 NOTE — PROGRESS NOTES
[facility]        NAME: Sandra Chau is a 54 y o  male  : 1975    MRN: [id number]  DATE: 2021  TIME: 2:16 PM    Assessment and Plan   Swelling of right hand [M79 89]  1  Swelling of right hand  predniSONE 10 mg tablet   2  Abrasion of left cornea, initial encounter  ofloxacin (OCUFLOX) 0 3 % ophthalmic solution    Ambulatory referral to Ophthalmology         Patient Instructions     Prednisone as prescribed  [place] during the day  Keep affected area clean and watch for signs of infection    Ofloxacin drops as prescribed  Do not wear contact lenses for duration of treatment  Some pain will return after office administered drops wear off  Follow up with ophthalmologist within 24 hours  Wear eye protection during high-risk activities when appropriate  Tylenol/Ibuprofen for pain  Avoid eye patching    Follow up with PCP in 3-5 days  Proceed to  ER if symptoms worsen  Chief Complaint     Chief Complaint   Patient presents with   [address]     stung by insect in R pinky yesterday afternoon  R hand very swollen   Blurred Vision     b/l eyes, started this morning, also feels like he scratched his L eye  History of Present Illness       Reports swelling of R hand following insect sting in R 5th finger  Insect Bite  This is a new problem  The current episode started yesterday  Associated symptoms include joint swelling  Pertinent negatives include no chest pain, chills, fatigue, nausea, numbness, vomiting or weakness  Treatments tried: claritin, benadryl  Eye Problem   Both eyes are affected  This is a new problem  Injury mechanism: sawdust  The pain is mild (L eye)  Associated symptoms include blurred vision, a foreign body sensation (L eye) and photophobia  Pertinent negatives include no eye discharge, eye redness, itching, nausea, vomiting or weakness  He has tried water for the symptoms         Review of Systems   Review of Systems   Constitutional: Negative for chills and fatigue  HENT: Negative for trouble swallowing  Eyes: Positive for blurred vision, photophobia, pain and visual disturbance  Negative for discharge, redness and itching  Respiratory: Negative for shortness of breath and stridor  Cardiovascular: Negative for chest pain and palpitations  Gastrointestinal: Negative for nausea and vomiting  Musculoskeletal: Positive for joint swelling  Skin: Negative for color change  Neurological: Negative for weakness and numbness  Current Medications       Current Outpatient Medications:     clonazePAM (KlonoPIN) 0 5 mg tablet, Take 0 5 mg by mouth daily as needed, Disp: , Rfl: 2    traZODone (DESYREL) 150 mg tablet, TAKE 1/2-1AT BEDTIME AS NEEDED FOR INSOMNIA, Disp: , Rfl: 0    ofloxacin (OCUFLOX) 0 3 % ophthalmic solution, Administer 1 drop to both eyes 4 (four) times a day for 7 days, Disp: 5 mL, Rfl: 0    pantoprazole (PROTONIX) 40 mg tablet, TAKE 1 TABLET BY MOUTH EVERY DAY (Patient not taking: Reported on 8/21/2021), Disp: 30 tablet, Rfl: 2    predniSONE 10 mg tablet, Take 6 pills day one, 5 pills day 2, 4 pills day 3, 3 pills day 4, 2 pills day 5, and 1 pill day 6 , Disp: 21 tablet, Rfl: 0    Current Allergies     Allergies as of 08/21/2021 - Reviewed 08/21/2021   Allergen Reaction Noted    Haldol [haloperidol]  11/06/2019            The following portions of the patient's history were reviewed and updated as appropriate: allergies, current medications, past family history, past medical history, past social history, past surgical history and problem list      Past Medical History:   Diagnosis Date    Alcohol abuse, daily use 11/6/2019    Anxiety     Depression     Hepatitis C     Hepatitis C infection     Infectious viral hepatitis     Insomnia     Personality disorder ([facility] [address] )        History reviewed  No pertinent surgical history      Family History   Adopted: Yes   Problem Relation Age of Onset    No Known Problems Mother     No Known Problems Father          Medications have been verified  Objective   /78   Pulse 77   Temp 99 2 °F (37 3 °C) (Temporal)   Resp 16   Wt 83 5 kg (184 lb)   SpO2 97%   BMI 27 17 kg/m²   No LMP for male patient  Physical Exam     Physical Exam  Constitutional:       General: He is not in acute distress  Appearance: He is well-developed  He is not diaphoretic  Eyes:      Extraocular Movements: Extraocular movements intact  Pupils: Pupils are equal, round, and reactive to light  Comments: L eye 20/50; R eye 20/20; B/L 20/20   Cardiovascular:      Rate and Rhythm: Normal rate and regular rhythm  Heart sounds: Normal heart sounds  No murmur heard  No friction rub  No gallop  Pulmonary:      Effort: Pulmonary effort is normal  No respiratory distress  Breath sounds: Normal breath sounds  No wheezing or rales  Chest:      Chest wall: No tenderness  Musculoskeletal:         General: No swelling or tenderness  Lymphadenopathy:      Cervical: No cervical adenopathy  Skin:     General: Skin is warm  Findings: No erythema  Neurological:      Mental Status: He is alert  After patient consent was obtained, 1 drop of tetracaine and fluorescein stain was administered into R/L eye  Direct visualization was achieved with UV light with L eye abrasion  B/L eyelids were everted without evidence of foreign body  B/L eye was irrigated with saline solution  Patient tolerated procedure without incident

## 2021-08-21 NOTE — PATIENT INSTRUCTIONS
Prednisone as prescribed  Allegra during the day  Keep affected area clean and watch for signs of infection    Ofloxacin drops as prescribed  Do not wear contact lenses for duration of treatment  Some pain will return after office administered drops wear off  Follow up with ophthalmologist within 24 hours  Wear eye protection during high-risk activities when appropriate  Tylenol/Ibuprofen for pain  Avoid eye patching    Follow up with PCP in 3-5 days  Proceed to  ER if symptoms worsen  Corneal Abrasion   WHAT YOU NEED TO KNOW:   A corneal abrasion is a scratch on the cornea of your eye  The cornea is the clear layer that covers the front of your eye  A small scratch may heal in 1 to 2 days  Deeper or larger scratches may take longer to heal       DISCHARGE INSTRUCTIONS:   Call your healthcare provider or ophthalmologist if:   · Your eye pain or vision gets worse  · You have yellow or green drainage from your eye  · You have questions or concerns about your condition or care  Medicines:   · Medicines  may be given in the form of eyedrops or ointment to help prevent an eye infection  You may also be given eyedrops to decrease pain  · Take your medicine as directed  Contact your healthcare provider if you think your medicine is not helping or if you have side effects  Tell him or her if you are allergic to any medicine  Keep a list of the medicines, vitamins, and herbs you take  Include the amounts, and when and why you take them  Bring the list or the pill bottles to follow-up visits  Carry your medicine list with you in case of an emergency  Care for your eyes:   · Get regular eye exams  Get your eyes checked at least every year  · Eat healthy foods  Fresh fruits and vegetables that are rich in vitamins A and C may help with your vision  Foods such as sweet potatoes, apricots, and carrots are rich in good nutrients for the eyes  · Take care of your contacts or glasses    Store, clean, and use your contacts or glasses as directed  Replace your glasses or contact lenses as often as your healthcare provider suggests  · Decrease eye strain  Rest your eyes, especially after you read or sew for long periods of time  Get plenty of sleep at night  Use lights that reduce glare in your home, school, or workplace  · Wear dark sunglasses  This will help prevent pain and light sensitivity  Make sure the sunglasses have UVA and UVB protection  This will protect your eyes when you go outside  · Use eyedrops safely  If your treatment plan includes eyedrops, it is important to use them as directed  Your provider may give you detailed instructions to follow  The eyedrops may also come with safety instructions  Follow all instructions to help prevent an infection  Do not touch the tip of the bottle to your eye  Germs from your eye can spread to the medicine bottle  Help prevent corneal abrasions:   · Remove your contact lenses if your eyes feel dry or irritated  · Wash your hands if you need to touch your eyes or your face  · Trim your child's fingernails so he or she cannot scratch his or her eye  · Wear protective eyewear when you work with chemicals, wood, dust, or metal     · Wear protective eyewear when you play sports  · Do not wear your contacts for longer than you should  · Do not wear colored lenses or lenses with shapes on them  These lenses may cause eye damage and vision loss  · Do not wear glitter makeup  Glitter can easily get into your eyes and under contact lenses  · Do not sleep with your contacts in  Follow up with your healthcare provider as directed:  Write down your questions so you remember to ask them during your visits  © Copyright [employer] 2021 Information is for End User's use only and may not be sold, redistributed or otherwise used for commercial purposes   All illustrations and images included in CareNotes® are the copyrighted property of [employer]  or [facility]  The above information is an  only  It is not intended as medical advice for individual conditions or treatments  Talk to your doctor, nurse or pharmacist before following any medical regimen to see if it is safe and effective for you  Insect Bite or Sting   WHAT YOU NEED TO KNOW:   Most insect bites and stings are not dangerous and go away without treatment  Your symptoms may be mild, or you may develop anaphylaxis  Anaphylaxis is a sudden, life-threatening reaction that needs immediate treatment  Common examples of insects that bite or sting are bees, ticks, mosquitoes, spiders, and ants  Insect bites or stings can lead to diseases such as malaria, West Nile virus, Lyme disease, or [place] Spotted Fever  DISCHARGE INSTRUCTIONS:   Call your local emergency number (911 in the [address] [place] [address],[address]) for signs or symptoms of anaphylaxis,  such as trouble breathing, swelling in your mouth or throat, or wheezing  You may also have itching, a rash, hives, or feel like you are going to faint  Return to the emergency department if:   · You are stung on your tongue or in your throat  · A white area forms around the bite  · You are sweating badly or have body pain  · You think you were bitten or stung by a poisonous insect  Call your doctor if:   · You have a fever  · The area becomes red, warm, tender, and swollen beyond the area of the bite or sting  · You have questions or concerns about your condition or care  Medicines: You may need any of the following:  · Antihistamines  decrease itching and rash  · Epinephrine  is used to treat severe allergic reactions such as anaphylaxis  · Take your medicine as directed  Contact your healthcare provider if you think your medicine is not helping or if you have side effects  Tell him of her if you are allergic to any medicine  Keep a list of the medicines, vitamins, and herbs you take   Include the amounts, and when and why you take them  Bring the list or the pill bottles to follow-up visits  Carry your medicine list with you in case of an emergency  Steps to take for signs or symptoms of anaphylaxis:   · Immediately  give 1 shot of epinephrine only into the outer thigh muscle  · Leave the shot in place  as directed  Your healthcare provider may recommend you leave it in place for up to 10 seconds before you remove it  This helps make sure all of the epinephrine is delivered  · Call 911 and go to the emergency department,  even if the shot improved symptoms  Do not drive yourself  Bring the used epinephrine shot with you  Safety precautions to take if you are at risk for anaphylaxis:   · Keep 2 shots of epinephrine with you at all times  You may need a second shot, because epinephrine only works for about 20 minutes and symptoms may return  Your healthcare provider can show you and family members how to give the shot  Check the expiration date every month and replace it before it expires  · Create an action plan  Your healthcare provider can help you create a written plan that explains the allergy and an emergency plan to treat a reaction  The plan explains when to give a second epinephrine shot if symptoms return or do not improve after the first  Give copies of the action plan and emergency instructions to family members, work and school staff, and  providers  Show them how to give a shot of epinephrine  · Carry medical alert identification  Wear medical alert jewelry or carry a card that says you have an insect allergy  Ask your healthcare provider where to get these items  If an insect bites or stings you:   · Remove the stinger  Scrape the stinger out with your fingernail, edge of a credit card, or a knife blade  Do not squeeze the wound  Gently wash the area with soap and water  · Remove the tick    Ticks must be removed as soon as possible so you do not get diseases passed through tick bites  Ask your healthcare provider for more information on tick bites and how to remove ticks  Care for a bite or sting wound:   · Elevate (raise) the area above the level of your heart, if possible  Prop the area on pillows to keep it raised comfortably  Elevate the area for 10 to 20 minutes each hour or as directed by your healthcare provider  · Use compresses  Soak a clean washcloth in cold water, wring it out, and put it on the bite or sting  Use the compress for 10 to 20 minutes each hour or as directed by your healthcare provider  After 24 to 48 hours, change to warm compresses  · Apply a paste  Add water to baking soda to make a thick paste  Put the paste on the area for 5 minutes  Rinse gently to remove the paste  Prevent another insect bite or sting:   · Do not wear bright-colored or flower-print clothing when you plan to spend time outdoors  Do not use hairspray, perfumes, or aftershave  · Do not leave food out  · Empty any standing water and wash container with soap and water every 2 days  · Put screens on all open windows and doors  · Put insect repellent that contains DEET on skin that is showing when you go outside  Put insect repellent at the top of your boots, bottom of pant legs, and sleeve cuffs  Wear long sleeves, pants, and shoes  · Use citronella candles outdoors to help keep mosquitoes away  Put a tick and flea collar on pets  Follow up with your doctor as directed:  Write down your questions so you remember to ask them during your visits  © Copyright [employer] 2021 Information is for End User's use only and may not be sold, redistributed or otherwise used for commercial purposes  All illustrations and images included in CareNotes® are the copyrighted property of [employer] , Inc  or [facility]jia   The above information is an  only  It is not intended as medical advice for individual conditions or treatments   Talk to your doctor, nurse or pharmacist before following any medical regimen to see if it is safe and effective for you

## 2021-11-11 ENCOUNTER — HOSPITAL ENCOUNTER (EMERGENCY)
Facility: HOSPITAL | Age: 46
Discharge: HOME | End: 2021-11-12
Attending: EMERGENCY MEDICINE
Payer: COMMERCIAL

## 2021-11-11 DIAGNOSIS — F10.929 ALCOHOL INTOXICATION (HCC): ICD-10-CM

## 2021-11-11 DIAGNOSIS — F32.A DEPRESSION: Primary | ICD-10-CM

## 2021-11-11 LAB — ETHANOL EXG-MCNC: 0.17 MG/DL

## 2021-11-11 PROCEDURE — 82075 ASSAY OF BREATH ETHANOL: CPT | Performed by: EMERGENCY MEDICINE

## 2021-11-11 PROCEDURE — 93005 ELECTROCARDIOGRAM TRACING: CPT

## 2021-11-11 PROCEDURE — 99285 EMERGENCY DEPT VISIT HI MDM: CPT

## 2021-11-11 RX ORDER — CLONAZEPAM 0.5 MG/1
1 TABLET ORAL
Status: DISCONTINUED | OUTPATIENT
Start: 2021-11-11 | End: 2021-11-12 | Stop reason: HOSPADM

## 2021-11-11 RX ORDER — CLONAZEPAM 0.5 MG/1
1 TABLET ORAL ONCE
Status: DISCONTINUED | OUTPATIENT
Start: 2021-11-11 | End: 2021-11-11

## 2021-11-11 RX ADMIN — TRAZODONE HYDROCHLORIDE 175 MG: 50 TABLET ORAL at 21:32

## 2021-11-11 RX ADMIN — CLONAZEPAM 1 MG: 0.5 TABLET ORAL at 21:32

## 2021-11-12 VITALS
OXYGEN SATURATION: 96 % | HEART RATE: 88 BPM | HEIGHT: 69 IN | DIASTOLIC BLOOD PRESSURE: 59 MMHG | WEIGHT: 186.51 LBS | TEMPERATURE: 98.5 F | RESPIRATION RATE: 18 BRPM | BODY MASS INDEX: 27.62 KG/M2 | SYSTOLIC BLOOD PRESSURE: 103 MMHG

## 2021-11-12 LAB
AMPHETAMINES SERPL QL SCN: NEGATIVE
ATRIAL RATE: 107 BPM
BARBITURATES UR QL: NEGATIVE
BENZODIAZ UR QL: NEGATIVE
COCAINE UR QL: NEGATIVE
ETHANOL EXG-MCNC: 0.08 MG/DL
METHADONE UR QL: NEGATIVE
OPIATES UR QL SCN: NEGATIVE
OXYCODONE+OXYMORPHONE UR QL SCN: NEGATIVE
P AXIS: 81 DEGREES
PCP UR QL: NEGATIVE
PR INTERVAL: 142 MS
QRS AXIS: 97 DEGREES
QRSD INTERVAL: 100 MS
QT INTERVAL: 354 MS
QTC INTERVAL: 472 MS
T WAVE AXIS: 41 DEGREES
THC UR QL: POSITIVE
VENTRICULAR RATE: 107 BPM

## 2021-11-12 PROCEDURE — 80307 DRUG TEST PRSMV CHEM ANLYZR: CPT | Performed by: EMERGENCY MEDICINE

## 2021-11-12 PROCEDURE — 93010 ELECTROCARDIOGRAM REPORT: CPT | Performed by: INTERNAL MEDICINE

## 2021-11-12 PROCEDURE — 99285 EMERGENCY DEPT VISIT HI MDM: CPT | Performed by: EMERGENCY MEDICINE

## 2021-11-12 RX ORDER — ACETAMINOPHEN 325 MG/1
650 TABLET ORAL ONCE
Status: COMPLETED | OUTPATIENT
Start: 2021-11-12 | End: 2021-11-12

## 2021-11-12 RX ORDER — LANOLIN ALCOHOL/MO/W.PET/CERES
3 CREAM (GRAM) TOPICAL
Status: DISCONTINUED | OUTPATIENT
Start: 2021-11-12 | End: 2021-11-12 | Stop reason: HOSPADM

## 2021-11-12 RX ADMIN — Medication 3 MG: at 03:47

## 2021-11-12 RX ADMIN — ACETAMINOPHEN 650 MG: 325 TABLET, FILM COATED ORAL at 03:47

## 2022-01-11 ENCOUNTER — OFFICE VISIT (OUTPATIENT)
Dept: FAMILY MEDICINE CLINIC | Facility: CLINIC | Age: 47
End: 2022-01-11
Payer: COMMERCIAL

## 2022-01-11 VITALS
SYSTOLIC BLOOD PRESSURE: 138 MMHG | BODY MASS INDEX: 30.07 KG/M2 | HEIGHT: 69 IN | HEART RATE: 105 BPM | WEIGHT: 203 LBS | OXYGEN SATURATION: 96 % | TEMPERATURE: 97.6 F | DIASTOLIC BLOOD PRESSURE: 87 MMHG

## 2022-01-11 DIAGNOSIS — R04.0 EPISTAXIS: ICD-10-CM

## 2022-01-11 DIAGNOSIS — R03.0 ELEVATED BLOOD PRESSURE, SITUATIONAL: Primary | ICD-10-CM

## 2022-01-11 DIAGNOSIS — R11.0 NAUSEA: ICD-10-CM

## 2022-01-11 DIAGNOSIS — Z13.220 LIPID SCREENING: ICD-10-CM

## 2022-01-11 DIAGNOSIS — F41.8 DEPRESSION WITH ANXIETY: ICD-10-CM

## 2022-01-11 PROCEDURE — 99214 OFFICE O/P EST MOD 30 MIN: CPT | Performed by: PHYSICIAN ASSISTANT

## 2022-01-11 RX ORDER — QUETIAPINE FUMARATE 100 MG/1
200 TABLET, FILM COATED ORAL DAILY
COMMUNITY
Start: 2021-12-15

## 2022-01-11 RX ORDER — PANTOPRAZOLE SODIUM 40 MG/1
40 TABLET, DELAYED RELEASE ORAL DAILY
Qty: 30 TABLET | Refills: 2 | Status: SHIPPED | OUTPATIENT
Start: 2022-01-11 | End: 2022-04-06

## 2022-01-11 NOTE — PROGRESS NOTES
Assessment/Plan:    No problem-specific Assessment & Plan notes found for this encounter  Problem List Items Addressed This Visit        Other    Lipid screening    Relevant Orders    Lipid panel    Epistaxis    Relevant Orders    Ambulatory Referral to Otolaryngology    CBC and differential    Comprehensive metabolic panel    Protime-INR    Iron Panel (Includes Ferritin, Iron Sat%, Iron, and TIBC)    Depression with anxiety    Relevant Medications    QUEtiapine (SEROquel) 100 mg tablet      Other Visit Diagnoses     Elevated blood pressure, situational    -  Primary    Nausea        Relevant Medications    pantoprazole (PROTONIX) 40 mg tablet          Monitor BP and keep journal for 2 weeks and follow up  Clear to have dental procedure based on todays visit  Follow up ENT for chronic nose bleeds- recommend daily saline/ayr saline gel and afrin (for no more than 2 days at time)  Update labs      Subjective:      Patient ID: Jennifer Mcelroy is a 51 y o  male  Patient presents for follow up on high BP read  He had a dentist visit about a week ago and was noted to have high blood pressure at the appointment prompting his visit today for BP check  He shares he has recently been under more stress   He is seeing Dr Pamela Villagomez for psychiatry who recently put him on seroquel- doing well on this  He does share frequent bloody noses- has history of this with cauterization  He is unsure if it is similar to previous, related to his BP or the dry heat in the house  Today BP stable at 138/87      The following portions of the patient's history were reviewed and updated as appropriate: allergies, current medications, past family history, past medical history, past surgical history and problem list     Review of Systems   Constitutional: Negative for chills, fatigue and fever  HENT: Positive for nosebleeds  Negative for congestion, ear pain, sinus pain, sore throat and trouble swallowing      Eyes: Negative for pain, discharge and redness  Respiratory: Negative for cough, chest tightness, shortness of breath and wheezing  Cardiovascular: Negative for chest pain, palpitations and leg swelling  Gastrointestinal: Negative for abdominal pain, diarrhea, nausea and vomiting  Musculoskeletal: Negative for arthralgias, joint swelling and myalgias  Skin: Negative for rash  Neurological: Negative for dizziness, weakness, numbness and headaches  Psychiatric/Behavioral: The patient is nervous/anxious  Objective:      /87   Pulse 105   Temp 97 6 °F (36 4 °C)   Ht 5' 9" (1 753 m)   Wt 92 1 kg (203 lb)   SpO2 96%   BMI 29 98 kg/m²          Physical Exam  Vitals and nursing note reviewed  Constitutional:       General: He is not in acute distress  Appearance: Normal appearance  He is well-developed  Cardiovascular:      Rate and Rhythm: Normal rate and regular rhythm  Pulmonary:      Effort: Pulmonary effort is normal       Breath sounds: Normal breath sounds  Abdominal:      General: Bowel sounds are normal       Palpations: Abdomen is soft  Abdomen is not rigid  Tenderness: There is no abdominal tenderness  There is no guarding or rebound  Negative signs include Leary's sign and McBurney's sign  Hernia: No hernia is present  Skin:     General: Skin is warm and dry  Psychiatric:         Attention and Perception: Attention normal          Mood and Affect: Mood is anxious  Speech: Speech normal          Behavior: Behavior normal          Thought Content:  Thought content normal          Cognition and Memory: Cognition normal

## 2022-01-15 LAB
ALBUMIN SERPL-MCNC: 4.2 G/DL (ref 4–5)
ALBUMIN/GLOB SERPL: 1.2 {RATIO} (ref 1.2–2.2)
ALP SERPL-CCNC: 75 IU/L (ref 44–121)
ALT SERPL-CCNC: 114 IU/L (ref 0–44)
AST SERPL-CCNC: 147 IU/L (ref 0–40)
BASOPHILS # BLD AUTO: 0 X10E3/UL (ref 0–0.2)
BASOPHILS NFR BLD AUTO: 1 %
BILIRUB SERPL-MCNC: 0.9 MG/DL (ref 0–1.2)
BUN SERPL-MCNC: 8 MG/DL (ref 6–24)
BUN/CREAT SERPL: 12 (ref 9–20)
CALCIUM SERPL-MCNC: 8.8 MG/DL (ref 8.7–10.2)
CHLORIDE SERPL-SCNC: 104 MMOL/L (ref 96–106)
CHOLEST SERPL-MCNC: 138 MG/DL (ref 100–199)
CO2 SERPL-SCNC: 21 MMOL/L (ref 20–29)
CREAT SERPL-MCNC: 0.69 MG/DL (ref 0.76–1.27)
EOSINOPHIL # BLD AUTO: 0.1 X10E3/UL (ref 0–0.4)
EOSINOPHIL NFR BLD AUTO: 2 %
ERYTHROCYTE [DISTWIDTH] IN BLOOD BY AUTOMATED COUNT: 12.6 % (ref 11.6–15.4)
FERRITIN SERPL-MCNC: 247 NG/ML (ref 30–400)
GLOBULIN SER-MCNC: 3.4 G/DL (ref 1.5–4.5)
GLUCOSE SERPL-MCNC: 70 MG/DL (ref 65–99)
HCT VFR BLD AUTO: 44.1 % (ref 37.5–51)
HDLC SERPL-MCNC: 48 MG/DL
HGB BLD-MCNC: 15.3 G/DL (ref 13–17.7)
IMM GRANULOCYTES # BLD: 0 X10E3/UL (ref 0–0.1)
IMM GRANULOCYTES NFR BLD: 0 %
INR PPP: 1.1 (ref 0.9–1.2)
IRON SATN MFR SERPL: 14 % (ref 15–55)
IRON SERPL-MCNC: 52 UG/DL (ref 38–169)
LDLC SERPL CALC-MCNC: 77 MG/DL (ref 0–99)
LYMPHOCYTES # BLD AUTO: 1.5 X10E3/UL (ref 0.7–3.1)
LYMPHOCYTES NFR BLD AUTO: 28 %
MCH RBC QN AUTO: 32.1 PG (ref 26.6–33)
MCHC RBC AUTO-ENTMCNC: 34.7 G/DL (ref 31.5–35.7)
MCV RBC AUTO: 93 FL (ref 79–97)
MONOCYTES # BLD AUTO: 0.7 X10E3/UL (ref 0.1–0.9)
MONOCYTES NFR BLD AUTO: 13 %
NEUTROPHILS # BLD AUTO: 3 X10E3/UL (ref 1.4–7)
NEUTROPHILS NFR BLD AUTO: 56 %
PLATELET # BLD AUTO: 108 X10E3/UL (ref 150–450)
POTASSIUM SERPL-SCNC: 3.8 MMOL/L (ref 3.5–5.2)
PROT SERPL-MCNC: 7.6 G/DL (ref 6–8.5)
PROTHROMBIN TIME: 11.9 SEC (ref 9.1–12)
RBC # BLD AUTO: 4.76 X10E6/UL (ref 4.14–5.8)
SL AMB EGFR AFRICAN AMERICAN: 131 ML/MIN/1.73
SL AMB EGFR NON AFRICAN AMERICAN: 113 ML/MIN/1.73
SL AMB VLDL CHOLESTEROL CALC: 13 MG/DL (ref 5–40)
SODIUM SERPL-SCNC: 139 MMOL/L (ref 134–144)
TIBC SERPL-MCNC: 380 UG/DL (ref 250–450)
TRIGL SERPL-MCNC: 62 MG/DL (ref 0–149)
UIBC SERPL-MCNC: 328 UG/DL (ref 111–343)
WBC # BLD AUTO: 5.3 X10E3/UL (ref 3.4–10.8)

## 2022-01-18 ENCOUNTER — OFFICE VISIT (OUTPATIENT)
Dept: FAMILY MEDICINE CLINIC | Facility: CLINIC | Age: 47
End: 2022-01-18
Payer: COMMERCIAL

## 2022-01-18 VITALS
TEMPERATURE: 97.8 F | HEART RATE: 110 BPM | HEIGHT: 69 IN | SYSTOLIC BLOOD PRESSURE: 128 MMHG | WEIGHT: 207 LBS | OXYGEN SATURATION: 97 % | BODY MASS INDEX: 30.66 KG/M2 | DIASTOLIC BLOOD PRESSURE: 94 MMHG

## 2022-01-18 DIAGNOSIS — R03.0 ELEVATED BLOOD PRESSURE, SITUATIONAL: ICD-10-CM

## 2022-01-18 DIAGNOSIS — R04.0 EPISTAXIS: Primary | ICD-10-CM

## 2022-01-18 DIAGNOSIS — B19.20 HEPATITIS C VIRUS INFECTION WITHOUT HEPATIC COMA, UNSPECIFIED CHRONICITY: ICD-10-CM

## 2022-01-18 PROCEDURE — 99214 OFFICE O/P EST MOD 30 MIN: CPT | Performed by: PHYSICIAN ASSISTANT

## 2022-01-18 PROCEDURE — 3008F BODY MASS INDEX DOCD: CPT | Performed by: PHYSICIAN ASSISTANT

## 2022-01-18 RX ORDER — AMLODIPINE BESYLATE 2.5 MG/1
2.5 TABLET ORAL DAILY
Qty: 30 TABLET | Refills: 2 | Status: SHIPPED | OUTPATIENT
Start: 2022-01-18 | End: 2022-01-25 | Stop reason: SDUPTHER

## 2022-01-18 NOTE — PROGRESS NOTES
Assessment/Plan:    No problem-specific Assessment & Plan notes found for this encounter  Problem List Items Addressed This Visit        Digestive    Hepatitis C virus infection without hepatic coma       Other    Epistaxis - Primary    Relevant Orders    Ambulatory Referral to Otolaryngology      Other Visit Diagnoses     Elevated blood pressure, situational        Relevant Medications    amLODIPine (NORVASC) 2 5 mg tablet            Subjective:      Patient ID: Betsy Pabon is a 51 y o  male  50 y/o male presents for HTN follow up  Patient taking iron supplement- his iron was low at 14  Likely secondary to the chronic nose bleeds  He has been supplementing since he was notified with a slow release iron tablet OTC  States he has had 1 nose bleed in 9 days which is much improved from having 1 nose bleed daily  He presents a BP log with most reads in the upper 140s and 150s over 172R diastolic  He has been using the cuff wrong  Compared to our cuff in office his cuff seems to be accurate  Today 128/94  We will start on low dose Norvasc  Pt shares with me he will be going to rehab   His liver functions were elevated which likely is attributed to his hx of hepatitis  Not currently under care of GI  Will need to re establish with GI      The following portions of the patient's history were reviewed and updated as appropriate: allergies, past family history, past medical history, past social history, past surgical history and problem list     Review of Systems   Constitutional: Negative for chills, fatigue and fever  HENT: Positive for nosebleeds  Negative for congestion, ear pain, sinus pain, sore throat and trouble swallowing  Eyes: Negative for pain, discharge and redness  Respiratory: Negative for cough, chest tightness, shortness of breath and wheezing  Cardiovascular: Negative for chest pain, palpitations and leg swelling     Gastrointestinal: Negative for abdominal pain, diarrhea, nausea and vomiting  Musculoskeletal: Negative for arthralgias, joint swelling and myalgias  Skin: Negative for rash  Neurological: Negative for dizziness, weakness, numbness and headaches  Objective:      /94 (BP Location: Other (Comment), Patient Position: Sitting, Cuff Size: Standard) Comment: On patients cuff Comment (BP Location): Left wrist  Pulse (!) 110   Temp 97 8 °F (36 6 °C)   Ht 5' 9" (1 753 m)   Wt 93 9 kg (207 lb)   SpO2 97%   BMI 30 57 kg/m²          Physical Exam  Vitals and nursing note reviewed  Constitutional:       General: He is not in acute distress  Appearance: Normal appearance  He is well-developed  Cardiovascular:      Rate and Rhythm: Normal rate and regular rhythm  Pulmonary:      Effort: Pulmonary effort is normal       Breath sounds: Normal breath sounds  Abdominal:      General: Bowel sounds are normal       Palpations: Abdomen is soft  Abdomen is not rigid  Tenderness: There is no abdominal tenderness  There is no guarding or rebound  Negative signs include Leary's sign and McBurney's sign  Hernia: No hernia is present  Skin:     General: Skin is warm and dry

## 2022-01-25 ENCOUNTER — TELEMEDICINE (OUTPATIENT)
Dept: FAMILY MEDICINE CLINIC | Facility: CLINIC | Age: 47
End: 2022-01-25
Payer: COMMERCIAL

## 2022-01-25 DIAGNOSIS — R03.0 ELEVATED BLOOD PRESSURE, SITUATIONAL: ICD-10-CM

## 2022-01-25 PROCEDURE — 1036F TOBACCO NON-USER: CPT | Performed by: PHYSICIAN ASSISTANT

## 2022-01-25 PROCEDURE — 99213 OFFICE O/P EST LOW 20 MIN: CPT | Performed by: PHYSICIAN ASSISTANT

## 2022-01-25 RX ORDER — AMLODIPINE BESYLATE 2.5 MG/1
2.5 TABLET ORAL DAILY
Qty: 60 TABLET | Refills: 0 | Status: SHIPPED | OUTPATIENT
Start: 2022-01-25 | End: 2022-04-05 | Stop reason: SDUPTHER

## 2022-01-25 NOTE — PROGRESS NOTES
Virtual Regular Visit    Verification of patient location:    Patient is located in the following state in which I hold an active license [place]      Assessment/Plan:    Problem List Items Addressed This Visit     None      Visit Diagnoses     Elevated blood pressure, situational        Relevant Medications    amLODIPine (NORVASC) 2 5 mg tablet        Recommend to continue daily monitoring- if BP is above 140/90 patient instructed to take 2 tabs of amlodipine = 5mg  F/u when patient returns from rehab       Reason for visit is   Chief Complaint   Patient presents with    Virtual Regular Visit        Encounter provider Sean Vallejo PA-C    Provider located at [place] [address]  [address] [place] [id number]      Recent Visits  Date Type Provider Dept   01/18/22 Office Visit Herson Mullen PA-C [address] recent visits within past 7 days and meeting all other requirements  Today's Visits  Date Type Provider Dept   01/25/22 Telemedicine Herson Mullen PA-C [address] today's visits and meeting all other requirements  Future Appointments  No visits were found meeting these conditions  Showing future appointments within next 150 days and meeting all other requirements       The patient was identified by name and date of birth  Betsy Pabon was informed that this is a telemedicine visit and that the visit is being conducted through Telephone  My office door was closed  No one else was in the room  He acknowledged consent and understanding of privacy and security of the video platform  The patient has agreed to participate and understands they can discontinue the visit at any time  Patient is aware this is a billable service  Subjective  Betsy Pabon is a 51 y o  male follow up on HTN         Patient following up via telemedicine for BP follow up  He has been taking 2 5mg amlodipine and tolerating well  BP averaging in the 130s/90s  At times up to 525Y systolic  He has had minimal nose bleeds  He denies lightheadedness, headaches or dizziness       Past Medical History:   Diagnosis Date    Alcohol abuse, daily use 11/6/2019    Anxiety     Depression     Hepatitis C     Hepatitis C infection     Infectious viral hepatitis     Insomnia     Personality disorder ([facility] [address] )        No past surgical history on file  Current Outpatient Medications   Medication Sig Dispense Refill    amLODIPine (NORVASC) 2 5 mg tablet Take 1 tablet (2 5 mg total) by mouth daily 60 tablet 0    clonazePAM (KlonoPIN) 0 5 mg tablet Take 0 5 mg by mouth daily as needed  2    pantoprazole (PROTONIX) 40 mg tablet Take 1 tablet (40 mg total) by mouth daily 30 tablet 2    predniSONE 10 mg tablet Take 6 pills day one, 5 pills day 2, 4 pills day 3, 3 pills day 4, 2 pills day 5, and 1 pill day 6  21 tablet 0    QUEtiapine (SEROquel) 100 mg tablet Take 100 mg by mouth in the morning      traZODone (DESYREL) 150 mg tablet TAKE 1/2-1AT BEDTIME AS NEEDED FOR INSOMNIA  0     No current facility-administered medications for this visit  Allergies   Allergen Reactions    Haldol [Haloperidol]        Review of Systems   Constitutional: Negative for chills, fatigue and fever  HENT: Positive for nosebleeds (less frequent)  Negative for congestion, ear pain, sinus pain, sore throat and trouble swallowing  Eyes: Negative for pain, discharge and redness  Respiratory: Negative for cough, chest tightness, shortness of breath and wheezing  Cardiovascular: Negative for chest pain, palpitations and leg swelling  Gastrointestinal: Negative for abdominal pain, diarrhea, nausea and vomiting  Musculoskeletal: Negative for arthralgias, joint swelling and myalgias  Skin: Negative for rash  Neurological: Negative for dizziness, weakness, numbness and headaches         Video Exam    There were no vitals filed for this visit     Physical Exam     I spent 10 minutes directly with the patient during this visit    VIRTUAL VISIT DISCLAIMER    It was my intent to perform this visit via video technology but the patient was not able to do a video connection so the visit was completed via audio telephone only  Wilma Petty verbally agrees to participate in [employer]  Pt is aware that [employer] could be limited without vital signs or the ability to perform a full hands-on physical Rosmery Cullen understands he or the provider may request at any time to terminate the video visit and request the patient to seek care or treatment in person

## 2022-04-01 ENCOUNTER — TELEPHONE (OUTPATIENT)
Dept: FAMILY MEDICINE CLINIC | Facility: CLINIC | Age: 47
End: 2022-04-01

## 2022-04-01 NOTE — TELEPHONE ENCOUNTER
Tali Hein updated referral - please notify patient and email referral to patient if needed  [email or web address]  com

## 2022-04-05 ENCOUNTER — OFFICE VISIT (OUTPATIENT)
Dept: FAMILY MEDICINE CLINIC | Facility: CLINIC | Age: 47
End: 2022-04-05
Payer: COMMERCIAL

## 2022-04-05 VITALS
BODY MASS INDEX: 31.4 KG/M2 | DIASTOLIC BLOOD PRESSURE: 94 MMHG | SYSTOLIC BLOOD PRESSURE: 146 MMHG | HEART RATE: 111 BPM | HEIGHT: 69 IN | TEMPERATURE: 97.9 F | WEIGHT: 212 LBS | OXYGEN SATURATION: 98 %

## 2022-04-05 DIAGNOSIS — R03.0 ELEVATED BLOOD PRESSURE, SITUATIONAL: ICD-10-CM

## 2022-04-05 DIAGNOSIS — E61.1 IRON DEFICIENCY: ICD-10-CM

## 2022-04-05 DIAGNOSIS — B19.20 HEPATITIS C VIRUS INFECTION WITHOUT HEPATIC COMA, UNSPECIFIED CHRONICITY: Primary | ICD-10-CM

## 2022-04-05 PROCEDURE — 1036F TOBACCO NON-USER: CPT | Performed by: PHYSICIAN ASSISTANT

## 2022-04-05 PROCEDURE — 3008F BODY MASS INDEX DOCD: CPT | Performed by: PHYSICIAN ASSISTANT

## 2022-04-05 PROCEDURE — 99214 OFFICE O/P EST MOD 30 MIN: CPT | Performed by: PHYSICIAN ASSISTANT

## 2022-04-05 RX ORDER — AMLODIPINE BESYLATE 2.5 MG/1
2.5 TABLET ORAL DAILY
Qty: 90 TABLET | Refills: 1 | Status: SHIPPED | OUTPATIENT
Start: 2022-04-05 | End: 2022-07-22 | Stop reason: SDUPTHER

## 2022-04-05 RX ORDER — BUSPIRONE HYDROCHLORIDE 10 MG/1
TABLET ORAL
COMMUNITY
Start: 2022-02-08

## 2022-04-05 NOTE — PROGRESS NOTES
Assessment/Plan:    No problem-specific Assessment & Plan notes found for this encounter  Diagnoses and all orders for this visit:    Hepatitis C virus infection without hepatic coma, unspecified chronicity  -     Ambulatory Referral to Gastroenterology; Future  -     Comprehensive metabolic panel; Future    Iron deficiency  -     CBC and differential; Future  -     Iron Panel (Includes Ferritin, Iron Sat%, Iron, and TIBC); Future    Elevated blood pressure, situational  -     amLODIPine (NORVASC) 2 5 mg tablet; Take 1 tablet (2 5 mg total) by mouth daily    Other orders  -     busPIRone (BUSPAR) 10 mg tablet          Subjective:      Patient ID: Gerardo King is a 51 y o  male  Patient presents today for a follow up  BP stable although reports he has been taking his medication intermittent not everyday  He does monitor his BP at home  He reports he recently completed 35 day treatment inpatient program for alcohol  Unfortunately 2 weeks after his arrival home he started drinking again  He has been under the care of Dr Anette Mireles psychiatry who is going to start vivitrol injection  Patient here today to ensure he is clear medically to proceed with the injections  Discussed importance of maintaining adequate BP    Pt also would like to proceed with hepatitis C treatment  He has discussed in 2020 with GI regarding proceeding with treatment      The following portions of the patient's history were reviewed and updated as appropriate: allergies, past family history, past medical history, past social history, past surgical history and problem list     Review of Systems   Constitutional: Negative for chills, fatigue and fever  HENT: Negative for congestion, ear pain, sinus pain, sore throat and trouble swallowing  Eyes: Negative for pain, discharge and redness  Respiratory: Negative for cough, chest tightness, shortness of breath and wheezing      Cardiovascular: Negative for chest pain, palpitations and leg swelling  Gastrointestinal: Negative for abdominal pain, diarrhea, nausea and vomiting  Musculoskeletal: Negative for arthralgias, joint swelling and myalgias  Skin: Negative for rash  Neurological: Negative for dizziness, weakness, numbness and headaches  Objective:      /94 (BP Location: Left arm, Patient Position: Sitting, Cuff Size: Large)   Pulse (!) 111   Temp 97 9 °F (36 6 °C)   Ht 5' 9" (1 753 m)   Wt 96 2 kg (212 lb)   SpO2 98%   BMI 31 31 kg/m²          Physical Exam  Vitals and nursing note reviewed  Constitutional:       General: He is not in acute distress  Appearance: Normal appearance  He is well-developed  Cardiovascular:      Rate and Rhythm: Normal rate and regular rhythm  Pulmonary:      Effort: Pulmonary effort is normal       Breath sounds: Normal breath sounds  Abdominal:      General: Bowel sounds are normal       Palpations: Abdomen is soft  Abdomen is not rigid  Tenderness: There is no abdominal tenderness  There is no guarding or rebound  Negative signs include Leary's sign and McBurney's sign  Hernia: No hernia is present  Skin:     General: Skin is warm and dry  Psychiatric:         Attention and Perception: Attention normal          Mood and Affect: Mood normal  Affect is flat  Speech: Speech normal          Behavior: Behavior normal          Thought Content:  Thought content normal          Cognition and Memory: Cognition normal

## 2022-04-06 ENCOUNTER — APPOINTMENT (OUTPATIENT)
Dept: LAB | Facility: CLINIC | Age: 47
End: 2022-04-06
Payer: COMMERCIAL

## 2022-04-06 DIAGNOSIS — R11.0 NAUSEA: ICD-10-CM

## 2022-04-06 DIAGNOSIS — Z13.220 LIPID SCREENING: ICD-10-CM

## 2022-04-06 DIAGNOSIS — R04.0 EPISTAXIS: ICD-10-CM

## 2022-04-06 DIAGNOSIS — B19.20 HEPATITIS C VIRUS INFECTION WITHOUT HEPATIC COMA, UNSPECIFIED CHRONICITY: ICD-10-CM

## 2022-04-06 DIAGNOSIS — E61.1 IRON DEFICIENCY: ICD-10-CM

## 2022-04-06 LAB
ALBUMIN SERPL BCP-MCNC: 3.8 G/DL (ref 3.5–5)
ALP SERPL-CCNC: 66 U/L (ref 46–116)
ALT SERPL W P-5'-P-CCNC: 129 U/L (ref 12–78)
ANION GAP SERPL CALCULATED.3IONS-SCNC: 7 MMOL/L (ref 4–13)
AST SERPL W P-5'-P-CCNC: 179 U/L (ref 5–45)
BASOPHILS # BLD AUTO: 0.03 THOUSANDS/ΜL (ref 0–0.1)
BASOPHILS NFR BLD AUTO: 1 % (ref 0–1)
BILIRUB SERPL-MCNC: 3.68 MG/DL (ref 0.2–1)
BUN SERPL-MCNC: 12 MG/DL (ref 5–25)
CALCIUM SERPL-MCNC: 9.2 MG/DL (ref 8.3–10.1)
CHLORIDE SERPL-SCNC: 105 MMOL/L (ref 100–108)
CO2 SERPL-SCNC: 25 MMOL/L (ref 21–32)
CREAT SERPL-MCNC: 0.84 MG/DL (ref 0.6–1.3)
EOSINOPHIL # BLD AUTO: 0.17 THOUSAND/ΜL (ref 0–0.61)
EOSINOPHIL NFR BLD AUTO: 3 % (ref 0–6)
ERYTHROCYTE [DISTWIDTH] IN BLOOD BY AUTOMATED COUNT: 13.6 % (ref 11.6–15.1)
FERRITIN SERPL-MCNC: 287 NG/ML (ref 8–388)
GFR SERPL CREATININE-BSD FRML MDRD: 104 ML/MIN/1.73SQ M
GLUCOSE SERPL-MCNC: 108 MG/DL (ref 65–140)
HCT VFR BLD AUTO: 44.3 % (ref 36.5–49.3)
HGB BLD-MCNC: 15.2 G/DL (ref 12–17)
IMM GRANULOCYTES # BLD AUTO: 0.01 THOUSAND/UL (ref 0–0.2)
IMM GRANULOCYTES NFR BLD AUTO: 0 % (ref 0–2)
INR PPP: 1.31 (ref 0.84–1.19)
IRON SATN MFR SERPL: 82 % (ref 20–50)
IRON SERPL-MCNC: 334 UG/DL (ref 65–175)
LYMPHOCYTES # BLD AUTO: 1.04 THOUSANDS/ΜL (ref 0.6–4.47)
LYMPHOCYTES NFR BLD AUTO: 20 % (ref 14–44)
MCH RBC QN AUTO: 30.3 PG (ref 26.8–34.3)
MCHC RBC AUTO-ENTMCNC: 34.3 G/DL (ref 31.4–37.4)
MCV RBC AUTO: 88 FL (ref 82–98)
MONOCYTES # BLD AUTO: 0.6 THOUSAND/ΜL (ref 0.17–1.22)
MONOCYTES NFR BLD AUTO: 12 % (ref 4–12)
NEUTROPHILS # BLD AUTO: 3.38 THOUSANDS/ΜL (ref 1.85–7.62)
NEUTS SEG NFR BLD AUTO: 64 % (ref 43–75)
NRBC BLD AUTO-RTO: 0 /100 WBCS
PLATELET # BLD AUTO: 104 THOUSANDS/UL (ref 149–390)
PMV BLD AUTO: 10 FL (ref 8.9–12.7)
POTASSIUM SERPL-SCNC: 3.4 MMOL/L (ref 3.5–5.3)
PROT SERPL-MCNC: 7.9 G/DL (ref 6.4–8.2)
PROTHROMBIN TIME: 15.7 SECONDS (ref 11.6–14.5)
RBC # BLD AUTO: 5.01 MILLION/UL (ref 3.88–5.62)
SODIUM SERPL-SCNC: 137 MMOL/L (ref 136–145)
TIBC SERPL-MCNC: 407 UG/DL (ref 250–450)
WBC # BLD AUTO: 5.23 THOUSAND/UL (ref 4.31–10.16)

## 2022-04-06 PROCEDURE — 85610 PROTHROMBIN TIME: CPT

## 2022-04-06 PROCEDURE — 82728 ASSAY OF FERRITIN: CPT

## 2022-04-06 PROCEDURE — 83540 ASSAY OF IRON: CPT

## 2022-04-06 PROCEDURE — 36415 COLL VENOUS BLD VENIPUNCTURE: CPT

## 2022-04-06 PROCEDURE — 83550 IRON BINDING TEST: CPT

## 2022-04-06 PROCEDURE — 80053 COMPREHEN METABOLIC PANEL: CPT

## 2022-04-06 PROCEDURE — 85025 COMPLETE CBC W/AUTO DIFF WBC: CPT

## 2022-04-06 RX ORDER — PANTOPRAZOLE SODIUM 40 MG/1
TABLET, DELAYED RELEASE ORAL
Qty: 90 TABLET | Refills: 0 | Status: SHIPPED | OUTPATIENT
Start: 2022-04-06 | End: 2022-07-22 | Stop reason: SDUPTHER

## 2022-04-11 ENCOUNTER — TELEPHONE (OUTPATIENT)
Dept: FAMILY MEDICINE CLINIC | Facility: CLINIC | Age: 47
End: 2022-04-11

## 2022-04-11 NOTE — TELEPHONE ENCOUNTER
Pt calls back and is having  A lot of pain on left side and nausea  Also his nipples hurt and not sure where that comes from perhaps medication   He seems pretty upset

## 2022-04-18 ENCOUNTER — HOSPITAL ENCOUNTER (OUTPATIENT)
Dept: ULTRASOUND IMAGING | Facility: CLINIC | Age: 47
Discharge: HOME | End: 2022-04-18
Payer: COMMERCIAL

## 2022-04-18 DIAGNOSIS — B19.20 HEPATITIS C VIRUS INFECTION WITHOUT HEPATIC COMA, UNSPECIFIED CHRONICITY: ICD-10-CM

## 2022-04-18 DIAGNOSIS — F10.10 ALCOHOL ABUSE, DAILY USE: ICD-10-CM

## 2022-04-18 DIAGNOSIS — E80.6 HYPERBILIRUBINEMIA: ICD-10-CM

## 2022-04-18 PROCEDURE — 76705 ECHO EXAM OF ABDOMEN: CPT

## 2022-05-23 ENCOUNTER — HOSPITAL ENCOUNTER (EMERGENCY)
Facility: HOSPITAL | Age: 47
Discharge: HOME | End: 2022-05-23
Attending: EMERGENCY MEDICINE | Admitting: EMERGENCY MEDICINE
Payer: COMMERCIAL

## 2022-05-23 VITALS
OXYGEN SATURATION: 96 % | SYSTOLIC BLOOD PRESSURE: 120 MMHG | TEMPERATURE: 98.2 F | HEART RATE: 104 BPM | DIASTOLIC BLOOD PRESSURE: 81 MMHG | RESPIRATION RATE: 18 BRPM

## 2022-05-23 DIAGNOSIS — Z00.8 ENCOUNTER FOR PSYCHOLOGICAL EVALUATION: Primary | ICD-10-CM

## 2022-05-23 PROCEDURE — 99284 EMERGENCY DEPT VISIT MOD MDM: CPT | Performed by: EMERGENCY MEDICINE

## 2022-05-23 PROCEDURE — 99284 EMERGENCY DEPT VISIT MOD MDM: CPT

## 2022-06-01 NOTE — ED PROVIDER NOTES
History  Chief Complaint   Patient presents with    Psychiatric Evaluation     Father  2 days ago, s/o reports he threatened to kill himself to Crisis and Police tonight  Asked s/o for knife and razor  Hx of cutting/self harm  Hx SI attempts  Reports heavy ETOH today  Pt refusing to talk in triage   Alcohol Problem     Just out of rehab  Started drinking again  34-year-old male presents to the emergency department for psychiatric evaluation  Patient's ears wife, patient states that his father  2 days ago, has a previous history of alcohol abuse, and the stress which has been brought on by his father's passing his causing to start using alcohol again, was drinking some alcohol beverages tonight, made a threat that he wanted to cut himself with a knife, and though initially his wife concerned that he wanted to do so to kill himself, to me, he states that he wanted to cut simply to relieve stress which he has done in the past, wife endorses and acknowledges this previous behavior as well  He states that he is not suicidal, and he is very concerned about any possible hospitalizations as he is scheduled to go to his father's  tomorrow  To me the patient was very open and honest, as well as to his wife who is also at bedside during our encounter  Prior to Admission Medications   Prescriptions Last Dose Informant Patient Reported? Taking? QUEtiapine (SEROquel) 100 mg tablet   Yes No   Sig: Take 200 mg by mouth in the morning     amLODIPine (NORVASC) 2 5 mg tablet   No No   Sig: Take 1 tablet (2 5 mg total) by mouth daily   busPIRone (BUSPAR) 10 mg tablet   Yes No   clonazePAM (KlonoPIN) 0 5 mg tablet   Yes No   Sig: Take 0 5 mg by mouth daily as needed   pantoprazole (PROTONIX) 40 mg tablet   No No   Sig: TAKE 1 TABLET BY MOUTH EVERY DAY   predniSONE 10 mg tablet   No No   Sig: Take 6 pills day one, 5 pills day 2, 4 pills day 3, 3 pills day 4, 2 pills day 5, and 1 pill day 6  traZODone (DESYREL) 150 mg tablet   Yes No   Sig: Take 300 mg by mouth daily at bedtime        Facility-Administered Medications: None       Past Medical History:   Diagnosis Date    Alcohol abuse, daily use 11/6/2019    Anxiety     Depression     Hepatitis C     Hepatitis C infection     Infectious viral hepatitis     Insomnia     Person[facility] disorder ([facility] [address] )        History reviewed  No pertinent surgical history  Family History   Adopted: Yes   Problem Relation Age of Onset    No Known Problems Mother     No Known Problems Father      I have reviewed and agree with the history as documented  E-Cigarette/Vaping    E-Cigarette Use Never User      E-Cigarette/Vaping Substances    Nicotine No     THC No     CBD No     Flavoring No     Other No     Unknown No      Social History     Tobacco Use    Smoking status: Never Smoker    Smokeless tobacco: Current User     Types: Chew   Vaping Use    Vaping Use: Never used   Substance Use Topics    Alcohol use: Yes     Alcohol/week: 42 0 standard drinks     Types: 28 Cans of beer, 14 Shots of liquor per week    Drug use: Yes     Frequency: 7 0 times per week     Types: Marijuana     Comment: last 5/23/22       Review of Systems   Constitutional: Negative for appetite change, chills, fatigue and fever  HENT: Negative for sneezing and sore throat  Eyes: Negative for visual disturbance  Respiratory: Negative for cough, choking, chest tightness, shortness of breath and wheezing  Cardiovascular: Negative for chest pain and palpitations  Gastrointestinal: Negative for abdominal pain, constipation, diarrhea, nausea and vomiting  Genitourinary: Negative for difficulty urinating and dysuria  Neurological: Negative for dizziness, weakness, light-headedness, numbness and headaches  Psychiatric/Behavioral: Positive for dysphoric mood and self-injury  The patient is nervous/anxious  All other systems reviewed and are negative        Physical Exam  Physical Exam  Vitals and nursing note reviewed  Constitutional:       General: He is not in acute distress  Appearance: He is well-developed  He is not diaphoretic  HENT:      Head: Normocephalic and atraumatic  Eyes:      Pupils: Pupils are equal, round, and reactive to light  Neck:      Vascular: No JVD  Trachea: No tracheal deviation  Cardiovascular:      Rate and Rhythm: Normal rate and regular rhythm  Heart sounds: Normal heart sounds  No murmur heard  No friction rub  No gallop  Pulmonary:      Effort: Pulmonary effort is normal  No respiratory distress  Breath sounds: Normal breath sounds  No wheezing or rales  Abdominal:      General: Bowel sounds are normal  There is no distension  Palpations: Abdomen is soft  Tenderness: There is no abdominal tenderness  There is no guarding or rebound  Skin:     General: Skin is warm and dry  Coloration: Skin is not pale  Neurological:      Mental Status: He is alert and oriented to person, place, and time  Cranial Nerves: No cranial nerve deficit  Motor: No abnormal muscle tone     Psychiatric:         Behavior: Behavior normal          Vital Signs  ED Triage Vitals [05/23/22 1921]   Temperature Pulse Respirations Blood Pressure SpO2   98 2 °F (36 8 °C) 104 18 120/81 96 %      Temp Source Heart Rate Source Patient Position - Orthostatic VS BP Location FiO2 (%)   Oral Monitor Sitting Left arm --      Pain Score       --           Vitals:    05/23/22 1921   BP: 120/81   Pulse: 104   Patient Position - Orthostatic VS: Sitting         Visual Acuity      ED Medications  Medications - No data to display    Diagnostic Studies  Results Reviewed     Procedure Component Value Units Date/Time    COVID/FLU/RSV [[id number]]     Lab Status: No result Specimen: Nares from Nose                  No orders to display              Procedures  Procedures         ED Course MDM  Number of Diagnoses or Management Options  Encounter for psychological evaluation  Diagnosis management comments: 43-year-old male with depression, alcohol abuse, though he endorses consuming alcohol here tonight, he is alert and oriented and has clinical decision-making capacity, to me he denies SI, his wife supportive of him and states that she feels that she can keep him safe at home, there is no access to firearms and any sharp objects have been removed per wife  To me he has been calm and cooperative, I will provide him with resources for outpatient substance abuse, but believe that he is stable for discharge tonight, especially in light of the fact that he needs to bury his father tomorrow, his wife is in agreement to the plan, will be with him tonight, and he and his wife both agree that if his behavior changes, or has worsening thoughts of suicide, that he should come back for re-evaluation  Disposition  Final diagnoses:   Encounter for psychological evaluation     Time reflects when diagnosis was documented in both MDM as applicable and the Disposition within this note     Time User Action Codes Description Comment    5/23/2022  8:43 PM Dana, [address] [place] [address] [Z00 8] Encounter for psychological evaluation       ED Disposition     ED Disposition   Discharge    Condition   Stable    Date/Time   Mon May 23, 2022  8:42 PM    Comment   Stephanie Rosa discharge to home/self care                 Follow-up Information     Follow up With Specialties Details Why [address], PA-C Family Medicine   [address] [place] [address]  [address]  [address]  [phone]            Discharge Medication List as of 5/23/2022  8:43 PM      CONTINUE these medications which have NOT CHANGED    Details   amLODIPine (NORVASC) 2 5 mg tablet Take 1 tablet (2 5 mg total) by mouth daily, Starting Tue 4/5/2022, Normal      busPIRone (BUSPAR) 10 mg tablet Starting Tue 2/8/2022, Historical Med      clonazePAM (KlonoPIN) 0 5 mg tablet Take 0 5 mg by mouth daily as needed, Starting Fri 10/18/2019, Historical Med      pantoprazole (PROTONIX) 40 mg tablet TAKE 1 TABLET BY MOUTH EVERY DAY, Normal      predniSONE 10 mg tablet Take 6 pills day one, 5 pills day 2, 4 pills day 3, 3 pills day 4, 2 pills day 5, and 1 pill day 6 , Normal      QUEtiapine (SEROquel) 100 mg tablet Take 200 mg by mouth in the morning  , Starting Wed 12/15/2021, Historical Med      traZODone (DESYREL) 150 mg tablet Take 300 mg by mouth daily at bedtime  , Starting Fri 10/18/2019, Historical Med             No discharge procedures on file      PDMP Review     None          ED Provider  Electronically Signed by           Bruce Ji MD  05/31/22 2053
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
## 2022-07-20 ENCOUNTER — APPOINTMENT (EMERGENCY)
Dept: RADIOLOGY | Facility: HOSPITAL | Age: 47
End: 2022-07-20
Payer: COMMERCIAL

## 2022-07-20 ENCOUNTER — HOSPITAL ENCOUNTER (OUTPATIENT)
Facility: HOSPITAL | Age: 47
Setting detail: OBSERVATION
Discharge: HOME | End: 2022-07-21
Attending: SURGERY | Admitting: SURGERY
Payer: COMMERCIAL

## 2022-07-20 ENCOUNTER — ANESTHESIA EVENT (OUTPATIENT)
Dept: PERIOP | Facility: HOSPITAL | Age: 47
End: 2022-07-20
Payer: COMMERCIAL

## 2022-07-20 ENCOUNTER — ANESTHESIA (OUTPATIENT)
Dept: PERIOP | Facility: HOSPITAL | Age: 47
End: 2022-07-20
Payer: COMMERCIAL
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
## 2022-07-20 DIAGNOSIS — S41.112A LACERATION OF LEFT UPPER EXTREMITY, INITIAL ENCOUNTER: ICD-10-CM

## 2022-07-20 DIAGNOSIS — S01.01XA LACERATION OF SCALP, INITIAL ENCOUNTER: ICD-10-CM

## 2022-07-20 DIAGNOSIS — S41.111A LACERATION OF RIGHT UPPER EXTREMITY, INITIAL ENCOUNTER: ICD-10-CM

## 2022-07-20 LAB
ABO GROUP BLD: NORMAL
ALBUMIN SERPL BCP-MCNC: 3.6 G/DL (ref 3.5–5)
ALP SERPL-CCNC: 67 U/L (ref 46–116)
ALT SERPL W P-5'-P-CCNC: 142 U/L (ref 12–78)
ANION GAP SERPL CALCULATED.3IONS-SCNC: 11 MMOL/L (ref 4–13)
AST SERPL W P-5'-P-CCNC: 161 U/L (ref 5–45)
BASE EXCESS BLDA CALC-SCNC: -3 MMOL/L (ref -2–3)
BASOPHILS # BLD AUTO: 0.05 THOUSANDS/ΜL (ref 0–0.1)
BASOPHILS NFR BLD AUTO: 1 % (ref 0–1)
BILIRUB SERPL-MCNC: 1.33 MG/DL (ref 0.2–1)
BLD GP AB SCN SERPL QL: NEGATIVE
BUN SERPL-MCNC: 8 MG/DL (ref 5–25)
CA-I BLD-SCNC: 1.07 MMOL/L (ref 1.12–1.32)
CALCIUM SERPL-MCNC: 8.2 MG/DL (ref 8.3–10.1)
CHLORIDE SERPL-SCNC: 113 MMOL/L (ref 96–108)
CO2 SERPL-SCNC: 23 MMOL/L (ref 21–32)
CREAT SERPL-MCNC: 1.05 MG/DL (ref 0.6–1.3)
EOSINOPHIL # BLD AUTO: 0.17 THOUSAND/ΜL (ref 0–0.61)
EOSINOPHIL NFR BLD AUTO: 2 % (ref 0–6)
ERYTHROCYTE [DISTWIDTH] IN BLOOD BY AUTOMATED COUNT: 15.4 % (ref 11.6–15.1)
GFR SERPL CREATININE-BSD FRML MDRD: 84 ML/MIN/1.73SQ M
GLUCOSE SERPL-MCNC: 134 MG/DL (ref 65–140)
GLUCOSE SERPL-MCNC: 134 MG/DL (ref 65–140)
HCO3 BLDA-SCNC: 22.6 MMOL/L (ref 24–30)
HCT VFR BLD AUTO: 45.9 % (ref 36.5–49.3)
HCT VFR BLD CALC: 46 % (ref 36.5–49.3)
HGB BLD-MCNC: 15 G/DL (ref 12–17)
HGB BLDA-MCNC: 15.6 G/DL (ref 12–17)
HOLD SPECIMEN: NORMAL
HOLD SPECIMEN: YES
IMM GRANULOCYTES # BLD AUTO: 0.03 THOUSAND/UL (ref 0–0.2)
IMM GRANULOCYTES NFR BLD AUTO: 0 % (ref 0–2)
LYMPHOCYTES # BLD AUTO: 2.86 THOUSANDS/ΜL (ref 0.6–4.47)
LYMPHOCYTES NFR BLD AUTO: 31 % (ref 14–44)
MCH RBC QN AUTO: 29.8 PG (ref 26.8–34.3)
MCHC RBC AUTO-ENTMCNC: 32.7 G/DL (ref 31.4–37.4)
MCV RBC AUTO: 91 FL (ref 82–98)
MONOCYTES # BLD AUTO: 1.01 THOUSAND/ΜL (ref 0.17–1.22)
MONOCYTES NFR BLD AUTO: 11 % (ref 4–12)
NEUTROPHILS # BLD AUTO: 5.08 THOUSANDS/ΜL (ref 1.85–7.62)
NEUTS SEG NFR BLD AUTO: 55 % (ref 43–75)
NRBC BLD AUTO-RTO: 0 /100 WBCS
PCO2 BLD: 24 MMOL/L (ref 21–32)
PCO2 BLD: 42.3 MM HG (ref 42–50)
PH BLD: 7.33 [PH] (ref 7.3–7.4)
PLATELET # BLD AUTO: 165 THOUSANDS/UL (ref 149–390)
PMV BLD AUTO: 9.5 FL (ref 8.9–12.7)
PO2 BLD: 71 MM HG (ref 35–45)
POTASSIUM BLD-SCNC: 3.5 MMOL/L (ref 3.5–5.3)
POTASSIUM SERPL-SCNC: 3.5 MMOL/L (ref 3.5–5.3)
PROT SERPL-MCNC: 7.6 G/DL (ref 6.4–8.4)
RBC # BLD AUTO: 5.04 MILLION/UL (ref 3.88–5.62)
RH BLD: POSITIVE
SAO2 % BLD FROM PO2: 93 % (ref 60–85)
SODIUM BLD-SCNC: 147 MMOL/L (ref 136–145)
SODIUM SERPL-SCNC: 147 MMOL/L (ref 135–147)
SPECIMEN EXPIRATION DATE: NORMAL
SPECIMEN SOURCE: ABNORMAL
WBC # BLD AUTO: 9.2 THOUSAND/UL (ref 4.31–10.16)

## 2022-07-20 PROCEDURE — 70450 CT HEAD/BRAIN W/O DYE: CPT

## 2022-07-20 PROCEDURE — 71260 CT THORAX DX C+: CPT

## 2022-07-20 PROCEDURE — 72125 CT NECK SPINE W/O DYE: CPT

## 2022-07-20 PROCEDURE — 93308 TTE F-UP OR LMTD: CPT | Performed by: SURGERY

## 2022-07-20 PROCEDURE — 84132 ASSAY OF SERUM POTASSIUM: CPT

## 2022-07-20 PROCEDURE — 11043 DBRDMT MUSC&/FSCA 1ST 20/<: CPT | Performed by: ORTHOPAEDIC SURGERY

## 2022-07-20 PROCEDURE — 96374 THER/PROPH/DIAG INJ IV PUSH: CPT

## 2022-07-20 PROCEDURE — 86850 RBC ANTIBODY SCREEN: CPT | Performed by: SURGERY

## 2022-07-20 PROCEDURE — 85025 COMPLETE CBC W/AUTO DIFF WBC: CPT | Performed by: SURGERY

## 2022-07-20 PROCEDURE — 82803 BLOOD GASES ANY COMBINATION: CPT

## 2022-07-20 PROCEDURE — 96365 THER/PROPH/DIAG IV INF INIT: CPT

## 2022-07-20 PROCEDURE — 73090 X-RAY EXAM OF FOREARM: CPT

## 2022-07-20 PROCEDURE — 12004 RPR S/N/AX/GEN/TRK7.6-12.5CM: CPT | Performed by: SURGERY

## 2022-07-20 PROCEDURE — 76705 ECHO EXAM OF ABDOMEN: CPT | Performed by: SURGERY

## 2022-07-20 PROCEDURE — NC001 PR NO CHARGE: Performed by: SURGERY

## 2022-07-20 PROCEDURE — 90471 IMMUNIZATION ADMIN: CPT

## 2022-07-20 PROCEDURE — 99220 PR INITIAL OBSERVATION CARE/DAY 70 MINUTES: CPT | Performed by: SURGERY

## 2022-07-20 PROCEDURE — 25270 REPAIR FOREARM TENDON/MUSCLE: CPT | Performed by: ORTHOPAEDIC SURGERY

## 2022-07-20 PROCEDURE — 90715 TDAP VACCINE 7 YRS/> IM: CPT | Performed by: EMERGENCY MEDICINE

## 2022-07-20 PROCEDURE — 99285 EMERGENCY DEPT VISIT HI MDM: CPT

## 2022-07-20 PROCEDURE — 82947 ASSAY GLUCOSE BLOOD QUANT: CPT

## 2022-07-20 PROCEDURE — 86901 BLOOD TYPING SEROLOGIC RH(D): CPT | Performed by: SURGERY

## 2022-07-20 PROCEDURE — 80053 COMPREHEN METABOLIC PANEL: CPT | Performed by: SURGERY

## 2022-07-20 PROCEDURE — 84295 ASSAY OF SERUM SODIUM: CPT

## 2022-07-20 PROCEDURE — 74177 CT ABD & PELVIS W/CONTRAST: CPT

## 2022-07-20 PROCEDURE — NC001 PR NO CHARGE: Performed by: EMERGENCY MEDICINE

## 2022-07-20 PROCEDURE — 36415 COLL VENOUS BLD VENIPUNCTURE: CPT

## 2022-07-20 PROCEDURE — 86900 BLOOD TYPING SEROLOGIC ABO: CPT | Performed by: SURGERY

## 2022-07-20 PROCEDURE — 82330 ASSAY OF CALCIUM: CPT

## 2022-07-20 PROCEDURE — 99245 OFF/OP CONSLTJ NEW/EST HI 55: CPT | Performed by: ORTHOPAEDIC SURGERY

## 2022-07-20 PROCEDURE — 85014 HEMATOCRIT: CPT

## 2022-07-20 PROCEDURE — 73130 X-RAY EXAM OF HAND: CPT

## 2022-07-20 PROCEDURE — 96375 TX/PRO/DX INJ NEW DRUG ADDON: CPT

## 2022-07-20 RX ORDER — DEXAMETHASONE SODIUM PHOSPHATE 10 MG/ML
INJECTION, SOLUTION INTRAMUSCULAR; INTRAVENOUS AS NEEDED
Status: DISCONTINUED | OUTPATIENT
Start: 2022-07-20 | End: 2022-07-20

## 2022-07-20 RX ORDER — FENTANYL CITRATE/PF 50 MCG/ML
25 SYRINGE (ML) INJECTION
Status: DISCONTINUED | OUTPATIENT
Start: 2022-07-20 | End: 2022-07-20 | Stop reason: HOSPADM

## 2022-07-20 RX ORDER — FOLIC ACID 1 MG/1
1 TABLET ORAL DAILY
Status: DISCONTINUED | OUTPATIENT
Start: 2022-07-21 | End: 2022-07-21 | Stop reason: HOSPADM

## 2022-07-20 RX ORDER — CEFAZOLIN SODIUM 2 G/50ML
2000 SOLUTION INTRAVENOUS ONCE
Status: COMPLETED | OUTPATIENT
Start: 2022-07-20 | End: 2022-07-20

## 2022-07-20 RX ORDER — NEOSTIGMINE METHYLSULFATE 1 MG/ML
INJECTION INTRAVENOUS AS NEEDED
Status: DISCONTINUED | OUTPATIENT
Start: 2022-07-20 | End: 2022-07-20

## 2022-07-20 RX ORDER — LIDOCAINE HYDROCHLORIDE 10 MG/ML
20 INJECTION, SOLUTION EPIDURAL; INFILTRATION; INTRACAUDAL; PERINEURAL ONCE
Status: COMPLETED | OUTPATIENT
Start: 2022-07-20 | End: 2022-07-20

## 2022-07-20 RX ORDER — GLYCOPYRROLATE 0.2 MG/ML
INJECTION INTRAMUSCULAR; INTRAVENOUS AS NEEDED
Status: DISCONTINUED | OUTPATIENT
Start: 2022-07-20 | End: 2022-07-20

## 2022-07-20 RX ORDER — HYDROMORPHONE HCL/PF 1 MG/ML
0.5 SYRINGE (ML) INJECTION ONCE
Status: COMPLETED | OUTPATIENT
Start: 2022-07-20 | End: 2022-07-20

## 2022-07-20 RX ORDER — SODIUM CHLORIDE, SODIUM LACTATE, POTASSIUM CHLORIDE, CALCIUM CHLORIDE 600; 310; 30; 20 MG/100ML; MG/100ML; MG/100ML; MG/100ML
INJECTION, SOLUTION INTRAVENOUS CONTINUOUS PRN
Status: DISCONTINUED | OUTPATIENT
Start: 2022-07-20 | End: 2022-07-20

## 2022-07-20 RX ORDER — MIDAZOLAM HYDROCHLORIDE 2 MG/2ML
INJECTION, SOLUTION INTRAMUSCULAR; INTRAVENOUS AS NEEDED
Status: DISCONTINUED | OUTPATIENT
Start: 2022-07-20 | End: 2022-07-20

## 2022-07-20 RX ORDER — CEFAZOLIN SODIUM 2 G/50ML
2000 SOLUTION INTRAVENOUS EVERY 8 HOURS
Status: DISCONTINUED | OUTPATIENT
Start: 2022-07-20 | End: 2022-07-20 | Stop reason: HOSPADM

## 2022-07-20 RX ORDER — HYDROMORPHONE HCL/PF 1 MG/ML
SYRINGE (ML) INJECTION AS NEEDED
Status: DISCONTINUED | OUTPATIENT
Start: 2022-07-20 | End: 2022-07-20

## 2022-07-20 RX ORDER — CEFAZOLIN SODIUM 2 G/50ML
2000 SOLUTION INTRAVENOUS EVERY 8 HOURS
Status: COMPLETED | OUTPATIENT
Start: 2022-07-21 | End: 2022-07-21

## 2022-07-20 RX ORDER — MEPERIDINE HYDROCHLORIDE 25 MG/ML
12.5 INJECTION INTRAMUSCULAR; INTRAVENOUS; SUBCUTANEOUS
Status: DISCONTINUED | OUTPATIENT
Start: 2022-07-20 | End: 2022-07-20 | Stop reason: HOSPADM

## 2022-07-20 RX ORDER — OXYCODONE HYDROCHLORIDE 10 MG/1
10 TABLET ORAL EVERY 4 HOURS PRN
Status: DISCONTINUED | OUTPATIENT
Start: 2022-07-20 | End: 2022-07-21 | Stop reason: HOSPADM

## 2022-07-20 RX ORDER — ALBUTEROL SULFATE 2.5 MG/3ML
2.5 SOLUTION RESPIRATORY (INHALATION) ONCE AS NEEDED
Status: DISCONTINUED | OUTPATIENT
Start: 2022-07-20 | End: 2022-07-20 | Stop reason: HOSPADM

## 2022-07-20 RX ORDER — HYDROMORPHONE HCL/PF 1 MG/ML
0.5 SYRINGE (ML) INJECTION
Status: DISCONTINUED | OUTPATIENT
Start: 2022-07-20 | End: 2022-07-20 | Stop reason: HOSPADM

## 2022-07-20 RX ORDER — SUCCINYLCHOLINE/SOD CL,ISO/PF 100 MG/5ML
SYRINGE (ML) INTRAVENOUS AS NEEDED
Status: DISCONTINUED | OUTPATIENT
Start: 2022-07-20 | End: 2022-07-20

## 2022-07-20 RX ORDER — ONDANSETRON 2 MG/ML
4 INJECTION INTRAMUSCULAR; INTRAVENOUS ONCE AS NEEDED
Status: DISCONTINUED | OUTPATIENT
Start: 2022-07-20 | End: 2022-07-20 | Stop reason: HOSPADM

## 2022-07-20 RX ORDER — ONDANSETRON 2 MG/ML
INJECTION INTRAMUSCULAR; INTRAVENOUS AS NEEDED
Status: DISCONTINUED | OUTPATIENT
Start: 2022-07-20 | End: 2022-07-20

## 2022-07-20 RX ORDER — ACETAMINOPHEN 325 MG/1
975 TABLET ORAL EVERY 8 HOURS SCHEDULED
Status: DISCONTINUED | OUTPATIENT
Start: 2022-07-20 | End: 2022-07-21 | Stop reason: HOSPADM

## 2022-07-20 RX ORDER — HYDROMORPHONE HCL/PF 1 MG/ML
0.5 SYRINGE (ML) INJECTION
Status: DISCONTINUED | OUTPATIENT
Start: 2022-07-20 | End: 2022-07-21 | Stop reason: HOSPADM

## 2022-07-20 RX ORDER — CLONAZEPAM 1 MG/1
1 TABLET ORAL
Status: DISCONTINUED | OUTPATIENT
Start: 2022-07-21 | End: 2022-07-21 | Stop reason: HOSPADM

## 2022-07-20 RX ORDER — PROPOFOL 10 MG/ML
INJECTION, EMULSION INTRAVENOUS AS NEEDED
Status: DISCONTINUED | OUTPATIENT
Start: 2022-07-20 | End: 2022-07-20

## 2022-07-20 RX ORDER — ONDANSETRON 2 MG/ML
4 INJECTION INTRAMUSCULAR; INTRAVENOUS EVERY 4 HOURS PRN
Status: DISCONTINUED | OUTPATIENT
Start: 2022-07-20 | End: 2022-07-21 | Stop reason: HOSPADM

## 2022-07-20 RX ORDER — LANOLIN ALCOHOL/MO/W.PET/CERES
100 CREAM (GRAM) TOPICAL DAILY
Status: DISCONTINUED | OUTPATIENT
Start: 2022-07-21 | End: 2022-07-21 | Stop reason: HOSPADM

## 2022-07-20 RX ORDER — LIDOCAINE HYDROCHLORIDE AND EPINEPHRINE 10; 10 MG/ML; UG/ML
10 INJECTION, SOLUTION INFILTRATION; PERINEURAL ONCE
Status: COMPLETED | OUTPATIENT
Start: 2022-07-20 | End: 2022-07-20

## 2022-07-20 RX ORDER — PROMETHAZINE HYDROCHLORIDE 25 MG/ML
12.5 INJECTION, SOLUTION INTRAMUSCULAR; INTRAVENOUS ONCE AS NEEDED
Status: DISCONTINUED | OUTPATIENT
Start: 2022-07-20 | End: 2022-07-20 | Stop reason: HOSPADM

## 2022-07-20 RX ORDER — FENTANYL CITRATE 50 UG/ML
50 INJECTION, SOLUTION INTRAMUSCULAR; INTRAVENOUS ONCE
Status: COMPLETED | OUTPATIENT
Start: 2022-07-20 | End: 2022-07-20

## 2022-07-20 RX ORDER — ENOXAPARIN SODIUM 100 MG/ML
30 INJECTION SUBCUTANEOUS EVERY 12 HOURS SCHEDULED
Status: DISCONTINUED | OUTPATIENT
Start: 2022-07-20 | End: 2022-07-21 | Stop reason: HOSPADM

## 2022-07-20 RX ORDER — LIDOCAINE HYDROCHLORIDE 10 MG/ML
INJECTION, SOLUTION EPIDURAL; INFILTRATION; INTRACAUDAL; PERINEURAL AS NEEDED
Status: DISCONTINUED | OUTPATIENT
Start: 2022-07-20 | End: 2022-07-20

## 2022-07-20 RX ORDER — FENTANYL CITRATE 50 UG/ML
INJECTION, SOLUTION INTRAMUSCULAR; INTRAVENOUS AS NEEDED
Status: DISCONTINUED | OUTPATIENT
Start: 2022-07-20 | End: 2022-07-20

## 2022-07-20 RX ORDER — OXYCODONE HYDROCHLORIDE 5 MG/1
5 TABLET ORAL EVERY 4 HOURS PRN
Status: DISCONTINUED | OUTPATIENT
Start: 2022-07-20 | End: 2022-07-21 | Stop reason: HOSPADM

## 2022-07-20 RX ORDER — SODIUM CHLORIDE, SODIUM LACTATE, POTASSIUM CHLORIDE, CALCIUM CHLORIDE 600; 310; 30; 20 MG/100ML; MG/100ML; MG/100ML; MG/100ML
125 INJECTION, SOLUTION INTRAVENOUS CONTINUOUS
Status: DISCONTINUED | OUTPATIENT
Start: 2022-07-20 | End: 2022-07-21

## 2022-07-20 RX ORDER — ROCURONIUM BROMIDE 10 MG/ML
INJECTION, SOLUTION INTRAVENOUS AS NEEDED
Status: DISCONTINUED | OUTPATIENT
Start: 2022-07-20 | End: 2022-07-20

## 2022-07-20 RX ADMIN — HYDROMORPHONE HYDROCHLORIDE 1 MG: 1 INJECTION, SOLUTION INTRAMUSCULAR; INTRAVENOUS; SUBCUTANEOUS at 21:00

## 2022-07-20 RX ADMIN — Medication 100 MG: at 20:37

## 2022-07-20 RX ADMIN — LIDOCAINE HYDROCHLORIDE,EPINEPHRINE BITARTRATE 10 ML: 10; .01 INJECTION, SOLUTION INFILTRATION; PERINEURAL at 13:15

## 2022-07-20 RX ADMIN — FENTANYL CITRATE 100 MCG: 50 INJECTION INTRAMUSCULAR; INTRAVENOUS at 20:37

## 2022-07-20 RX ADMIN — OXYCODONE HYDROCHLORIDE 10 MG: 10 TABLET ORAL at 23:52

## 2022-07-20 RX ADMIN — NEOSTIGMINE METHYLSULFATE 2 MG: 1 INJECTION INTRAVENOUS at 21:41

## 2022-07-20 RX ADMIN — FENTANYL CITRATE 50 MCG: 50 INJECTION INTRAMUSCULAR; INTRAVENOUS at 12:37

## 2022-07-20 RX ADMIN — IOHEXOL 100 ML: 350 INJECTION, SOLUTION INTRAVENOUS at 12:23

## 2022-07-20 RX ADMIN — ROCURONIUM BROMIDE 20 MG: 10 INJECTION INTRAVENOUS at 20:46

## 2022-07-20 RX ADMIN — ACETAMINOPHEN 975 MG: 325 TABLET ORAL at 23:52

## 2022-07-20 RX ADMIN — HYDROMORPHONE HYDROCHLORIDE 0.25 MG: 1 INJECTION, SOLUTION INTRAMUSCULAR; INTRAVENOUS; SUBCUTANEOUS at 21:32

## 2022-07-20 RX ADMIN — DEXAMETHASONE SODIUM PHOSPHATE 10 MG: 10 INJECTION, SOLUTION INTRAMUSCULAR; INTRAVENOUS at 20:37

## 2022-07-20 RX ADMIN — LIDOCAINE HYDROCHLORIDE 20 ML: 10 INJECTION, SOLUTION EPIDURAL; INFILTRATION; INTRACAUDAL at 13:16

## 2022-07-20 RX ADMIN — PROPOFOL 200 MG: 10 INJECTION, EMULSION INTRAVENOUS at 20:37

## 2022-07-20 RX ADMIN — LIDOCAINE HYDROCHLORIDE 50 MG: 10 INJECTION, SOLUTION EPIDURAL; INFILTRATION; INTRACAUDAL at 20:37

## 2022-07-20 RX ADMIN — HYDROMORPHONE HYDROCHLORIDE 0.25 MG: 1 INJECTION, SOLUTION INTRAMUSCULAR; INTRAVENOUS; SUBCUTANEOUS at 21:22

## 2022-07-20 RX ADMIN — MIDAZOLAM 2 MG: 1 INJECTION INTRAMUSCULAR; INTRAVENOUS at 20:33

## 2022-07-20 RX ADMIN — SODIUM CHLORIDE, SODIUM LACTATE, POTASSIUM CHLORIDE, AND CALCIUM CHLORIDE: .6; .31; .03; .02 INJECTION, SOLUTION INTRAVENOUS at 20:33

## 2022-07-20 RX ADMIN — GLYCOPYRROLATE 0.2 MCG: 0.2 INJECTION, SOLUTION INTRAMUSCULAR; INTRAVENOUS at 21:41

## 2022-07-20 RX ADMIN — TETANUS TOXOID, REDUCED DIPHTHERIA TOXOID AND ACELLULAR PERTUSSIS VACCINE, ADSORBED 0.5 ML: 5; 2.5; 8; 8; 2.5 SUSPENSION INTRAMUSCULAR at 11:57

## 2022-07-20 RX ADMIN — Medication 50 MG: at 20:44

## 2022-07-20 RX ADMIN — CEFAZOLIN SODIUM 2000 MG: 2 SOLUTION INTRAVENOUS at 11:58

## 2022-07-20 RX ADMIN — HYDROMORPHONE HYDROCHLORIDE 0.5 MG: 1 INJECTION, SOLUTION INTRAMUSCULAR; INTRAVENOUS; SUBCUTANEOUS at 13:50

## 2022-07-20 RX ADMIN — HYDROMORPHONE HYDROCHLORIDE 0.5 MG: 1 INJECTION, SOLUTION INTRAMUSCULAR; INTRAVENOUS; SUBCUTANEOUS at 21:45

## 2022-07-20 RX ADMIN — CEFAZOLIN SODIUM 2000 MG: 2 SOLUTION INTRAVENOUS at 20:33

## 2022-07-20 RX ADMIN — ONDANSETRON 4 MG: 2 INJECTION INTRAMUSCULAR; INTRAVENOUS at 20:37
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
## 2022-07-20 NOTE — ASSESSMENT & PLAN NOTE
-No tendon or vascular involvement   -Will XR to eval for foreign body, if negative, will close with sutures

## 2022-07-20 NOTE — ASSESSMENT & PLAN NOTE
-Patient unable to extend the 4th and 5th digit in the left hand  -Ortho consulted for evaluation, appreciate their recommendations   -Will need closure, pending Ortho eval   -Tetanus updated, ancef given in the bay
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
## 2022-07-20 NOTE — PROCEDURES
POC FAST US    Date/Time: 7/20/2022 12:38 PM  Performed by: Jordan Castillo MD  Authorized by:  Jordan Castillo MD     Patient location:  Trauma  Other Assisting Provider: No    Procedure details:     Exam Type:  Diagnostic    Indications: blunt abdominal trauma      Assess for:  Hemothorax, intra-abdominal fluid, pericardial effusion and pneumothorax    Technique: FAST      Views obtained:  Heart - Pericardial sac, RUQ - Peralta's Pouch, LUQ - Splenorenal space and Suprapubic - Pouch of Omar    Image quality: diagnostic      Image availability:  Video obtained and images available in PACS  FAST Findings:     RUQ (Hepatorenal) free fluid: absent      LUQ (Splenorenal) free fluid: indeterminate      Suprapubic free fluid: absent      Cardiac wall motion: identified      Pericardial effusion: absent    Interpretation:     Impressions: indeterminate
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
## 2022-07-20 NOTE — ANESTHESIA PREPROCEDURE EVALUATION
Procedure:  DEBRIDEMENT UPPER EXTREMITY ([address] OUT) (Right Arm Lower)  REPAIR TENDON FOREARM (Right Arm Lower)    Relevant Problems   No relevant active problems      HTN HCV    Physical Exam    Airway    Mallampati score: II  TM Distance: >3 FB  Neck ROM: full     Dental       Cardiovascular  Cardiovascular exam normal    Pulmonary  Pulmonary exam normal     Other Findings        Anesthesia Plan  ASA Score- 2     Anesthesia Type- general with ASA Monitors  Additional Monitors:   Airway Plan: ETT  Plan Factors-Exercise tolerance (METS): >4 METS  Chart reviewed  EKG reviewed  Imaging results reviewed  Existing labs reviewed  Patient summary reviewed  Patient is not a current smoker  Patient did not smoke on day of surgery  Obstructive sleep apnea risk education given perioperatively  Induction- intravenous  Postoperative Plan- Plan for postoperative opioid use  Planned trial extubation    Informed Consent- Anesthetic plan and risks discussed with patient  I personally reviewed this patient with the CRNA  Discussed and agreed on the Anesthesia Plan with the CRNA Gerhardt Sep
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
## 2022-07-20 NOTE — PROCEDURES
Laceration repair    Date/Time: 7/20/2022 2:06 PM  Performed by: Christine Bailon MD  Authorized by: Christine Bailon MD   Consent: Verbal consent obtained  Risks and benefits: risks, benefits and alternatives were discussed  Consent given by: patient  Patient understanding: patient states understanding of the procedure being performed  Required items: required blood products, implants, devices, and special equipment available  Patient identity confirmed: verbally with patient  Body area: upper extremity  Location details: left lower arm  Laceration length: 7 cm  Foreign bodies: no foreign bodies  Tendon involvement: none  Nerve involvement: none  Anesthesia: local infiltration    Anesthesia:  Local Anesthetic: lidocaine 1% with epinephrine  Anesthetic total: 5 mL    Wound Dehiscence:  Superficial Wound Dehiscence: simple closure      Procedure Details:  Preparation: Patient was prepped and draped in the usual sterile fashion  Irrigation solution: saline  Irrigation method: jet lavage  Amount of cleaning: standard  Debridement: none  Degree of undermining: none  Skin closure: 4-0 Prolene  Number of sutures: 6  Technique: simple  Approximation: close  Approximation difficulty: simple  Dressing: 4x4 sterile gauze  Patient tolerance: patient tolerated the procedure well with no immediate complications    Laceration repair    Date/Time: 7/20/2022 2:07 PM  Performed by: Christine Bailon MD  Authorized by: Christine Bailon MD   Consent: Verbal consent obtained    Risks and benefits: risks, benefits and alternatives were discussed  Consent given by: patient  Patient understanding: patient states understanding of the procedure being performed  Required items: required blood products, implants, devices, and special equipment available  Patient identity confirmed: verbally with patient  Body area: head/neck  Location details: scalp  Laceration length: 5 cm  Foreign bodies: no foreign bodies  Tendon involvement: none  Nerve involvement: none  Vascular damage: no    Sedation:  Patient sedated: no      Wound Dehiscence:  Superficial Wound Dehiscence: simple closure      Procedure Details:  Preparation: Patient was prepped and draped in the usual sterile fashion  Irrigation solution: saline  Irrigation method: syringe  Amount of cleaning: standard  Debridement: none  Degree of undermining: none  Skin closure: staples  Number of sutures: 9 Staples    Approximation: close  Approximation difficulty: simple  Dressing: 4x4 sterile gauze  Patient tolerance: patient tolerated the procedure well with no immediate complications
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
## 2022-07-21 VITALS
OXYGEN SATURATION: 93 % | WEIGHT: 209.44 LBS | HEIGHT: 70 IN | RESPIRATION RATE: 18 BRPM | SYSTOLIC BLOOD PRESSURE: 133 MMHG | HEART RATE: 93 BPM | BODY MASS INDEX: 29.98 KG/M2 | TEMPERATURE: 98 F | DIASTOLIC BLOOD PRESSURE: 76 MMHG

## 2022-07-21 PROBLEM — F10.90 ALCOHOL DRINKING PROBLEM: Status: ACTIVE | Noted: 2022-07-21

## 2022-07-21 PROBLEM — Z72.89 ALCOHOL DRINKING PROBLEM: Status: ACTIVE | Noted: 2022-07-21

## 2022-07-21 PROCEDURE — 99217 PR OBSERVATION CARE DISCHARGE MANAGEMENT: CPT | Performed by: SURGERY

## 2022-07-21 PROCEDURE — NC001 PR NO CHARGE: Performed by: SURGERY

## 2022-07-21 PROCEDURE — 97166 OT EVAL MOD COMPLEX 45 MIN: CPT

## 2022-07-21 PROCEDURE — NC001 PR NO CHARGE: Performed by: ORTHOPAEDIC SURGERY

## 2022-07-21 PROCEDURE — 97162 PT EVAL MOD COMPLEX 30 MIN: CPT

## 2022-07-21 RX ADMIN — THIAMINE HCL TAB 100 MG 100 MG: 100 TAB at 08:30

## 2022-07-21 RX ADMIN — ENOXAPARIN SODIUM 30 MG: 30 INJECTION SUBCUTANEOUS at 08:30

## 2022-07-21 RX ADMIN — OXYCODONE HYDROCHLORIDE 10 MG: 10 TABLET ORAL at 13:29

## 2022-07-21 RX ADMIN — CLONAZEPAM 1 MG: 1 TABLET ORAL at 00:10

## 2022-07-21 RX ADMIN — CEFAZOLIN SODIUM 2000 MG: 2 SOLUTION INTRAVENOUS at 11:10

## 2022-07-21 RX ADMIN — ACETAMINOPHEN 975 MG: 325 TABLET ORAL at 08:30

## 2022-07-21 RX ADMIN — CEFAZOLIN SODIUM 2000 MG: 2 SOLUTION INTRAVENOUS at 03:15

## 2022-07-21 RX ADMIN — OXYCODONE HYDROCHLORIDE 10 MG: 10 TABLET ORAL at 04:10

## 2022-07-21 RX ADMIN — OXYCODONE HYDROCHLORIDE 10 MG: 10 TABLET ORAL at 08:29

## 2022-07-21 RX ADMIN — TRAZODONE HYDROCHLORIDE 150 MG: 100 TABLET ORAL at 00:10

## 2022-07-21 RX ADMIN — SODIUM CHLORIDE, SODIUM LACTATE, POTASSIUM CHLORIDE, AND CALCIUM CHLORIDE 125 ML/HR: .6; .31; .03; .02 INJECTION, SOLUTION INTRAVENOUS at 03:13

## 2022-07-21 RX ADMIN — FOLIC ACID 1 MG: 1 TABLET ORAL at 08:29

## 2022-07-21 RX ADMIN — HYDROMORPHONE HYDROCHLORIDE 0.5 MG: 1 INJECTION, SOLUTION INTRAMUSCULAR; INTRAVENOUS; SUBCUTANEOUS at 02:03

## 2022-07-21 RX ADMIN — Medication 1 TABLET: at 08:30

## 2022-07-21 NOTE — ASSESSMENT & PLAN NOTE
-Patient unable to extend the 4th and 5th digit in the left hand  -Ortho consulted for evaluation, appreciate their recommendations   -Will need closure, pending Ortho eval   -Tetanus updated, ancef given in the [facility] with ortho 07/20 for repair; exam improved post-op    -Will follow up in 1 week with Ortho PA

## 2022-07-21 NOTE — ASSESSMENT & PLAN NOTE
-patient has been on CIWA protocol  -States this AM that he has setup with [facility] for rehab and will be able to go either today or tomorrow  He has the number to call them

## 2022-07-21 NOTE — ANESTHESIA POSTPROCEDURE EVALUATION
Post-Op Assessment Note    CV Status:  Stable  Pain Score: 0    Pain management: adequate     Mental Status:  Alert and awake   Hydration Status:  Euvolemic   PONV Controlled:  Controlled   Airway Patency:  Patent      Post Op Vitals Reviewed: Yes      Staff: Anesthesiologist, CRNA         No complications documented      BP   140/74   Temp   99 1   Pulse  90   Resp   18   SpO2   94

## 2022-07-21 NOTE — PROGRESS NOTES
Progress Note - Orthopedics   Patrick Meredith 51 y o  male MRN: [id number]  [facility]      Subjective:    No acute events, no complaints   Resting comfortably in bed     Labs:  0   Lab Value Date/Time    HCT 45 9 07/20/2022 1208    HCT 46 07/20/2022 1205    HGB 15 0 07/20/2022 1208    HGB 15 6 07/20/2022 1205    WBC 9 20 07/20/2022 1208       Meds:    Current Facility-Administered Medications:     acetaminophen (TYLENOL) tablet 975 mg, 975 mg, Oral, Q8H [address], Lizz Hardy MD, 975 mg at 07/20/22 2352    ceFAZolin (ANCEF) IVPB (premix in dextrose) 2,000 mg 50 mL, 2,000 mg, Intravenous, Q8H, Lizz Hardy MD, Stopped at 07/21/22 0345    clonazePAM (KlonoPIN) tablet 1 mg, 1 mg, Oral, HS, Anoop Meza DO, 1 mg at 07/21/22 0010    enoxaparin (LOVENOX) subcutaneous injection 30 mg, 30 mg, Subcutaneous, Q12H [address], Lizz Hardy MD    folic acid (FOLVITE) tablet 1 mg, 1 mg, Oral, Daily, Lizz Hardy MD    HYDROmorphone (DILAUDID) injection 0 5 mg, 0 5 mg, Intravenous, Q1H PRN, Lizz Hardy MD, 0 5 mg at 07/21/22 0203    lactated ringers infusion, 125 mL/hr, Intravenous, Continuous, Jorge Cordero CRNA, Last Rate: 125 mL/hr at 07/21/22 0313, 125 mL/hr at 07/21/22 0313    multivitamin-minerals (CENTRUM) tablet 1 tablet, 1 tablet, Oral, Daily, Jordin Carlos MD    naloxone (NARCAN) 0 04 mg/mL syringe 0 04 mg, 0 04 mg, Intravenous, Q1MIN PRN, Lizz Hardy MD    ondansetron (ZOFRAN) injection 4 mg, 4 mg, Intravenous, Q4H PRN, Lizz Hardy MD    oxyCODONE (ROXICODONE) immediate release tablet 10 mg, 10 mg, Oral, Q4H PRN, Lizz Hardy MD, 10 mg at 07/21/22 0410    oxyCODONE (ROXICODONE) IR tablet 5 mg, 5 mg, Oral, Q4H PRN, Lizz Hardy MD    thiamine tablet 100 mg, 100 mg, Oral, Daily, Lizz Hardy MD    traZODone (DESYREL) tablet 150 mg, 150 mg, Oral, HS PRN, Anoop Meza DO, 150 mg at 07/21/22 0010    Blood Culture:   No results found for: BLOODCX    Wound Culture:   No results found for: WOUNDCULT    Ins and Outs:  I/O last 24 hours: In: 1312 5 [I V :1212 5; IV Piggyback:100]  Out: 600 [Urine:600]          Physical:  Vitals:    07/21/22 0719   BP: 125/75   Pulse: (!) 106   Resp: 18   Temp:    SpO2: 92%     Musculoskeletal: right Upper Extremity  · Dressing clean dry and intact   · Sensation intact to all exposed fingers   · Motor intact to all exposed fingers   · Fingers are WWP with <2 sec capillary refill     Assessment:    46 y o male POD 1 right forearm washout and extensor muscle belly repair       Plan:  · NWB RUE in splint    · PT/OT  · Pain control  · DVT ppx  · Dispo: Ortho will follow    Nisreen Rajput MD

## 2022-07-21 NOTE — PLAN OF CARE
Problem: MOBILITY - ADULT  Goal: Maintain or return to baseline ADL function  Description: INTERVENTIONS:  -  Assess patient's ability to carry out ADLs; assess patient's baseline for ADL function and identify physical deficits which impact ability to perform ADLs (bathing, care of mouth/teeth, toileting, grooming, dressing, etc )  - Assess/evaluate cause of self-care deficits   - Assess range of motion  - Assess patient's mobility; develop plan if impaired  - Assess patient's need for assistive devices and provide as appropriate  - Encourage maximum independence but intervene and supervise when necessary  - Involve family in performance of ADLs  - Assess for home care needs following discharge   - Consider OT consult to assist with ADL evaluation and planning for discharge  - Provide patient education as appropriate  7/21/2022 1331 by Og Duckworth RN  Outcome: Adequate for Discharge  7/21/2022 4506 by Og Duckworth RN  Outcome: Progressing  Goal: Maintains/Returns to pre admission functional level  Description: INTERVENTIONS:  - Perform BMAT or MOVE assessment daily    - Set and communicate daily mobility goal to care team and patient/family/caregiver  - Collaborate with rehabilitation services on mobility goals if consulted  - Perform Range of Motion **3* times a day  - Reposition patient every *2** hours    - Dangle patient *3* times a day  - Stand patient *3** times a day  - Ambulate patient **3* times a day  - Out of bed to chair *3** times a day   - Out of bed for meals *3** times a day  - Out of bed for toileting  - Record patient progress and toleration of activity level   7/21/2022 1331 by Og Duckworth RN  Outcome: Adequate for Discharge  7/21/2022 1410 by Og Duckworth RN  Outcome: Progressing     Problem: PAIN - ADULT  Goal: Verbalizes/displays adequate comfort level or baseline comfort level  Description: Interventions:  - Encourage patient to monitor pain and request assistance  - Assess pain using appropriate pain scale  - Administer analgesics based on type and severity of pain and evaluate response  - Implement non-pharmacological measures as appropriate and evaluate response  - Consider cultural and social influences on pain and pain management  - Notify physician/advanced practitioner if interventions unsuccessful or patient reports new pain  7/21/2022 1331 by Al Castillo RN  Outcome: Adequate for Discharge  7/21/2022 7162 by Al Castillo RN  Outcome: Progressing     Problem: SAFETY ADULT  Goal: Maintain or return to baseline ADL function  Description: INTERVENTIONS:  -  Assess patient's ability to carry out ADLs; assess patient's baseline for ADL function and identify physical deficits which impact ability to perform ADLs (bathing, care of mouth/teeth, toileting, grooming, dressing, etc )  - Assess/evaluate cause of self-care deficits   - Assess range of motion  - Assess patient's mobility; develop plan if impaired  - Assess patient's need for assistive devices and provide as appropriate  - Encourage maximum independence but intervene and supervise when necessary  - Involve family in performance of ADLs  - Assess for home care needs following discharge   - Consider OT consult to assist with ADL evaluation and planning for discharge  - Provide patient education as appropriate  7/21/2022 1331 by Al Castillo RN  Outcome: Adequate for Discharge  7/21/2022 9967 by Al Castillo RN  Outcome: Progressing  Goal: Maintains/Returns to pre admission functional level  Description: INTERVENTIONS:  - Perform BMAT or MOVE assessment daily    - Set and communicate daily mobility goal to care team and patient/family/caregiver  - Collaborate with rehabilitation services on mobility goals if consulted  - Perform Range of Motion *3** times a day  - Reposition patient every *2** hours    - Dangle patient *3** times a day  - Stand patient *3** times a day  - Ambulate patient *3** times a day  - Out of bed to chair *3** times a day   - Out of bed for meals *3** times a day  - Out of bed for toileting  - Record patient progress and toleration of activity level   7/21/2022 1331 by Gadiel Barber RN  Outcome: Adequate for Discharge  7/21/2022 4018 by Gadiel Barber RN  Outcome: Progressing  Goal: Patient will remain free of falls  Description: INTERVENTIONS:  - Educate patient/family on patient safety including physical limitations  - Instruct patient to call for assistance with activity   - Consult OT/PT to assist with strengthening/mobility   - Keep Call bell within reach  - Keep bed low and locked with side rails adjusted as appropriate  - Keep care items and personal belongings within reach  - Initiate and maintain comfort rounds  - Make Fall Risk Sign visible to staff  - Offer Toileting every *2** Hours, in advance of need  - Initiate/Maintain **bed/chair *alarm  - Obtain necessary fall risk management equipment:   - Apply yellow socks and bracelet for high fall risk patients  - Consider moving patient to room near nurses station  7/21/2022 1331 by Gadiel Barber RN  Outcome: Adequate for Discharge  7/21/2022 5055 by Gadiel Barber RN  Outcome: Progressing     Problem: Knowledge Deficit  Goal: Patient/family/caregiver demonstrates understanding of disease process, treatment plan, medications, and discharge instructions  Description: Complete learning assessment and assess knowledge base    Interventions:  - Provide teaching at level of understanding  - Provide teaching via preferred learning methods  7/21/2022 1331 by Gadiel Barber RN  Outcome: Adequate for Discharge  7/21/2022 8408 by Gadiel Barber RN  Outcome: Progressing

## 2022-07-21 NOTE — DISCHARGE INSTRUCTIONS
You will need the sutures and staples removed in 7 days  You can call your primary care provider and see if they will feel comfortable removing the sutures and staples  Otherwise, you can follow up with the [facility]uma [facility] for removal of your sutures and staples or follow up at an urgent care/ED  Watch for signs of infection of your wounds, such as fevers, pus or redness around the wound  Keep the area clean and dry and do twice daily dressing changes  Return to your [facility] ED if you have any concerns  Discharge Instructions - Orthopedics  Fabien Estevez 51 y o  male MRN: [id number]  Unit/Bed#: [facility] 629-01    Weight Bearing Status:                                           Non weight bearing right upper extremity in a splint  Weight bearing as tolerated left up    Pain:  Continue analgesics as directed    Dressing Instructions:   Please keep clean, dry and intact until follow up     Appt Instructions: If you do not have your appointment, please call the clinic at [phone] t  Otherwise followup as scheduled     Contact the office sooner if you experience any increased numbness/tingling in the extremities

## 2022-07-21 NOTE — ASSESSMENT & PLAN NOTE
-No tendon or vascular involvement   -Will XR to eval for foreign body, if negative, will close with sutures   -Suture removal in 5 days as outpatient

## 2022-07-21 NOTE — ASSESSMENT & PLAN NOTE
-Patient unable to extend the 4th and 5th digit in the left hand  -Ortho consulted for evaluation, appreciate their recommendations   -Will need closure, pending Ortho eval   -Tetanus updated, ancef given in the [facility] 49 with ortho 07/20 for repair; exam improved post-op
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
## 2022-07-21 NOTE — PLAN OF CARE
Problem: MOBILITY - ADULT  Goal: Maintain or return to baseline ADL function  Description: INTERVENTIONS:  -  Assess patient's ability to carry out ADLs; assess patient's baseline for ADL function and identify physical deficits which impact ability to perform ADLs (bathing, care of mouth/teeth, toileting, grooming, dressing, etc )  - Assess/evaluate cause of self-care deficits   - Assess range of motion  - Assess patient's mobility; develop plan if impaired  - Assess patient's need for assistive devices and provide as appropriate  - Encourage maximum independence but intervene and supervise when necessary  - Involve family in performance of ADLs  - Assess for home care needs following discharge   - Consider OT consult to assist with ADL evaluation and planning for discharge  - Provide patient education as appropriate  Outcome: Progressing  Goal: Maintains/Returns to pre admission functional level  Description: INTERVENTIONS:  - Perform BMAT or MOVE assessment daily    - Set and communicate daily mobility goal to care team and patient/family/caregiver  - Collaborate with rehabilitation services on mobility goals if consulted  - Perform Range of Motion *3** times a day  - Reposition patient every *2** hours    - Dangle patient **3* times a day  - Stand patient **3* times a day  - Ambulate patient *3** times a day  - Out of bed to chair *3** times a day   - Out of bed for meals *3** times a day  - Out of bed for toileting  - Record patient progress and toleration of activity level   Outcome: Progressing     Problem: PAIN - ADULT  Goal: Verbalizes/displays adequate comfort level or baseline comfort level  Description: Interventions:  - Encourage patient to monitor pain and request assistance  - Assess pain using appropriate pain scale  - Administer analgesics based on type and severity of pain and evaluate response  - Implement non-pharmacological measures as appropriate and evaluate response  - Consider cultural and social influences on pain and pain management  - Notify physician/advanced practitioner if interventions unsuccessful or patient reports new pain  Outcome: Progressing     Problem: SAFETY ADULT  Goal: Maintain or return to baseline ADL function  Description: INTERVENTIONS:  -  Assess patient's ability to carry out ADLs; assess patient's baseline for ADL function and identify physical deficits which impact ability to perform ADLs (bathing, care of mouth/teeth, toileting, grooming, dressing, etc )  - Assess/evaluate cause of self-care deficits   - Assess range of motion  - Assess patient's mobility; develop plan if impaired  - Assess patient's need for assistive devices and provide as appropriate  - Encourage maximum independence but intervene and supervise when necessary  - Involve family in performance of ADLs  - Assess for home care needs following discharge   - Consider OT consult to assist with ADL evaluation and planning for discharge  - Provide patient education as appropriate  Outcome: Progressing  Goal: Maintains/Returns to pre admission functional level  Description: INTERVENTIONS:  - Perform BMAT or MOVE assessment daily    - Set and communicate daily mobility goal to care team and patient/family/caregiver  - Collaborate with rehabilitation services on mobility goals if consulted  - Perform Range of Motion 3 times a day  - Reposition patient every *2** hours    - Dangle patient *3** times a day  - Stand patient *3** times a day  - Ambulate patient **3* times a day  - Out of bed to chair *3** times a day   - Out of bed for meals *3** times a day  - Out of bed for toileting  - Record patient progress and toleration of activity level   Outcome: Progressing  Goal: Patient will remain free of falls  Description: INTERVENTIONS:  - Educate patient/family on patient safety including physical limitations  - Instruct patient to call for assistance with activity   - Consult OT/PT to assist with strengthening/mobility   - Keep Call bell within reach  - Keep bed low and locked with side rails adjusted as appropriate  - Keep care items and personal belongings within reach  - Initiate and maintain comfort rounds  - Make Fall Risk Sign visible to staff  - Offer Toileting every **2* Hours, in advance of need  - Initiate/Maintain *bed/chair**alarm  - Obtain necessary fall risk management equipment:   - Apply yellow socks and bracelet for high fall risk patients  - Consider moving patient to room near nurses station  Outcome: Progressing     Problem: Knowledge Deficit  Goal: Patient/family/caregiver demonstrates understanding of disease process, treatment plan, medications, and discharge instructions  Description: Complete learning assessment and assess knowledge base    Interventions:  - Provide teaching at level of understanding  - Provide teaching via preferred learning methods  Outcome: Progressing
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
## 2022-07-22 ENCOUNTER — TELEPHONE (OUTPATIENT)
Dept: FAMILY MEDICINE CLINIC | Facility: CLINIC | Age: 47
End: 2022-07-22

## 2022-07-22 ENCOUNTER — TELEPHONE (OUTPATIENT)
Dept: SURGERY | Facility: CLINIC | Age: 47
End: 2022-07-22

## 2022-07-22 ENCOUNTER — TRANSITIONAL CARE MANAGEMENT (OUTPATIENT)
Dept: FAMILY MEDICINE CLINIC | Facility: CLINIC | Age: 47
End: 2022-07-22

## 2022-07-22 DIAGNOSIS — R11.0 NAUSEA: ICD-10-CM

## 2022-07-22 DIAGNOSIS — S41.119A LACERATION OF ARM: Primary | ICD-10-CM

## 2022-07-22 DIAGNOSIS — R03.0 ELEVATED BLOOD PRESSURE, SITUATIONAL: ICD-10-CM

## 2022-07-22 RX ORDER — AMLODIPINE BESYLATE 2.5 MG/1
2.5 TABLET ORAL DAILY
Qty: 90 TABLET | Refills: 1 | Status: SHIPPED | OUTPATIENT
Start: 2022-07-22 | End: 2023-09-12

## 2022-07-22 RX ORDER — PANTOPRAZOLE SODIUM 40 MG/1
40 TABLET, DELAYED RELEASE ORAL DAILY
Qty: 90 TABLET | Refills: 0 | Status: SHIPPED | OUTPATIENT
Start: 2022-07-22 | End: 2023-09-12
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
## 2022-07-22 NOTE — UTILIZATION REVIEW
Notification of Discharge   This is a Notification of Discharge from our facility [address]  Please be advised that this patient has been discharge from our facility  Below you will find the admission and discharge date and time including the patients disposition  UTILIZATION REVIEW CONTACT:  Lyudmila Humphries  Utilization   Network Utilization Review Department  Phone: [phone] x carefully listen to the prompts  All voicemails are confidential   Email: [email or web address]     PHYSICIAN ADVISORY SERVICES:  FOR [id number] REVIEW - MEDICAL NECESSITY DENIAL  Phone: [phone]  Fax: [phone]  Email: [email or web address]     PRESENTATION DATE: 7/20/2022 11:52 AM  OBERVATION ADMISSION DATE:   INPATIENT ADMISSION DATE: N/A N/A   DISCHARGE DATE: 7/21/2022  2:18 PM  DISPOSITION: Home/Self Care Home/Self Care      IMPORTANT INFORMATION:  Send all requests for admission clinical reviews, approved or denied determinations and any other requests to dedicated fax number below belonging to the campus where the patient is receiving treatment   List of dedicated fax numbers:  [address] DENIALS (Administrative/Medical Necessity) [phone]   [address] (Maternity/NICU/Pediatrics) [phone]   [facility] [phone]   [address] [facility] [phone]   [address] [place] [address] [phone]   [address] [place] [address] [address] [place] [address],[address] [phone]   [facility]  [phone]   [address] [facility], [facility]  [address] [phone]

## 2022-07-23 RX ORDER — METHOCARBAMOL 750 MG/1
750 TABLET, FILM COATED ORAL 4 TIMES DAILY
Qty: 45 TABLET | Refills: 0 | Status: SHIPPED | OUTPATIENT
Start: 2022-07-23

## 2022-07-23 RX ORDER — OXYCODONE HYDROCHLORIDE 5 MG/1
5 TABLET ORAL EVERY 4 HOURS PRN
Qty: 15 TABLET | Refills: 0 | Status: SHIPPED | OUTPATIENT
Start: 2022-07-23

## 2022-07-23 NOTE — TELEPHONE ENCOUNTER
Called patient to discuss his wounds and pain  He says that since he left the hospital he has had uncontrolled pain in his bilateral arm wounds  Chart was reviewed and it appears his R arm had muscle bony involvement and was washed out and repaired by orthopedics and is in a splint  His left arm wound was repaired by trauma  He was taking oxycodone 10 mg every 4 hours prn in the hospital prior to discharge and discharged with recommendations of taking ibuprofen prn for pain  He admits to some draining from the left arm wound  He has been applying triple antibiotic ointment and a gauze dressing to it and changing it daily  He denies surrounding redness and denies fevers/chills  He does have some occasional tingling in the hand due to a bit of swelling  He has not had relief from ibuprofen 800 mg and ice  I recommend he take ibuprofen 600 mg every 6 hours, methocarbamol 750 mg every 6 hours, apply ice to the bilateral arms throughout the day, on 20 mins and off 20 mins and keep the arms elevated to improve swelling  I sent methocarbamol to the pharmacy and also sent #15 tablets of oxycodone 5 mg to the pharmacy to be taken q4h as needed over the next several days  He has a f/u appt with his PCP for wound check next week and has orthopedics follow up scheduled in 2 weeks  He was instructed to contact the trauma office for wound check if needed, in the event his PCP recommends this after evaluation this week or if he has any new or concerning symptoms  I explained that if he develops increased swelling, any redness or malodorous/increased thick or purulent drainage from the wound he should go to the ED/Urgent care for wound check sooner  Likewise, if he were to develop fevers/chills  I explained that oxycodone would not be refilled again, and that this is to get him through the next few days until he has his wounds evaluated, and at that point his pain should be tolerable on a non-narcotic regimen   He verbalized understanding and was in agreement

## 2022-07-26 ENCOUNTER — OFFICE VISIT (OUTPATIENT)
Dept: FAMILY MEDICINE CLINIC | Facility: CLINIC | Age: 47
End: 2022-07-26
Payer: COMMERCIAL

## 2022-07-26 VITALS
OXYGEN SATURATION: 95 % | HEART RATE: 107 BPM | WEIGHT: 210 LBS | HEIGHT: 70 IN | DIASTOLIC BLOOD PRESSURE: 81 MMHG | TEMPERATURE: 97.1 F | SYSTOLIC BLOOD PRESSURE: 123 MMHG | BODY MASS INDEX: 30.06 KG/M2

## 2022-07-26 DIAGNOSIS — S41.111A LACERATION OF RIGHT UPPER EXTREMITY, INITIAL ENCOUNTER: ICD-10-CM

## 2022-07-26 DIAGNOSIS — S51.812A LACERATION OF LEFT FOREARM, INITIAL ENCOUNTER: Primary | ICD-10-CM

## 2022-07-26 DIAGNOSIS — S01.01XA LACERATION OF SCALP, INITIAL ENCOUNTER: ICD-10-CM

## 2022-07-26 PROCEDURE — 99214 OFFICE O/P EST MOD 30 MIN: CPT | Performed by: PHYSICIAN ASSISTANT

## 2022-07-26 RX ORDER — DOXYCYCLINE 100 MG/1
100 TABLET ORAL 2 TIMES DAILY
Qty: 14 TABLET | Refills: 0 | Status: SHIPPED | OUTPATIENT
Start: 2022-07-26 | End: 2022-08-02
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
## 2022-07-28 ENCOUNTER — OFFICE VISIT (OUTPATIENT)
Dept: FAMILY MEDICINE CLINIC | Facility: CLINIC | Age: 47
End: 2022-07-28
Payer: COMMERCIAL

## 2022-07-28 ENCOUNTER — APPOINTMENT (OUTPATIENT)
Dept: RADIOLOGY | Facility: CLINIC | Age: 47
End: 2022-07-28
Payer: COMMERCIAL

## 2022-07-28 VITALS
SYSTOLIC BLOOD PRESSURE: 126 MMHG | TEMPERATURE: 97.6 F | WEIGHT: 212 LBS | DIASTOLIC BLOOD PRESSURE: 84 MMHG | OXYGEN SATURATION: 96 % | HEIGHT: 70 IN | HEART RATE: 94 BPM | BODY MASS INDEX: 30.35 KG/M2

## 2022-07-28 DIAGNOSIS — M79.89 PAIN AND SWELLING OF LEFT FOREARM: ICD-10-CM

## 2022-07-28 DIAGNOSIS — M79.632 PAIN AND SWELLING OF LEFT FOREARM: ICD-10-CM

## 2022-07-28 DIAGNOSIS — M79.89 PAIN AND SWELLING OF LEFT FOREARM: Primary | ICD-10-CM

## 2022-07-28 DIAGNOSIS — M79.632 PAIN AND SWELLING OF LEFT FOREARM: Primary | ICD-10-CM

## 2022-07-28 DIAGNOSIS — S41.111A LACERATION OF RIGHT UPPER EXTREMITY, INITIAL ENCOUNTER: ICD-10-CM

## 2022-07-28 DIAGNOSIS — S41.112A LACERATION OF LEFT UPPER EXTREMITY, INITIAL ENCOUNTER: ICD-10-CM

## 2022-07-28 PROCEDURE — 73090 X-RAY EXAM OF FOREARM: CPT

## 2022-07-28 PROCEDURE — 99214 OFFICE O/P EST MOD 30 MIN: CPT | Performed by: PHYSICIAN ASSISTANT

## 2022-07-28 NOTE — PROGRESS NOTES
Assessment/Plan:    No problem-specific Assessment & Plan notes found for this encounter  Diagnoses and all orders for this visit:    Pain and swelling of left forearm  -     XR forearm 2 vw left; Future    Laceration of left upper extremity, initial encounter  -     Suture removal    Laceration of right upper extremity, initial encounter      Repeat left forearm xray  Recommend to continue icing the right forearm to decrease swelling  Finish abx    Subjective:      Patient ID: Kali Reilly is a 51 y o  male  Patient presents today for wound check  He has sutures in the left forearm with plans for removal today  Has been keeping the area more dry than previously  He has concerns for swelling over this site which has not worsened but has not improved  Has full ROM of the arm with sensation  Denies fever, chills or drainage from the wounds      The following portions of the patient's history were reviewed and updated as appropriate: current medications, past family history, past medical history, past social history, past surgical history and problem list     Review of Systems   Constitutional: Negative for chills, fatigue and fever  HENT: Negative for congestion, ear pain, sinus pain, sore throat and trouble swallowing  Eyes: Negative for pain, discharge and redness  Respiratory: Negative for cough, chest tightness, shortness of breath and wheezing  Cardiovascular: Negative for chest pain, palpitations and leg swelling  Gastrointestinal: Negative for abdominal pain, diarrhea, nausea and vomiting  Musculoskeletal: Negative for arthralgias, joint swelling and myalgias  Skin: Positive for wound  Negative for rash  Neurological: Negative for dizziness, weakness, numbness and headaches           Objective:      /84 (BP Location: Left arm, Patient Position: Sitting, Cuff Size: Large)   Pulse 94   Temp 97 6 °F (36 4 °C)   Ht 5' 10" (1 778 m)   Wt 96 2 kg (212 lb)   SpO2 96%   BMI 30 42 kg/m²          Physical Exam  Constitutional:       General: He is not in acute distress  Appearance: He is well-developed  Cardiovascular:      Rate and Rhythm: Normal rate and regular rhythm  Heart sounds: Normal heart sounds  Pulmonary:      Effort: Pulmonary effort is normal       Breath sounds: Normal breath sounds  Skin:             Suture removal    Date/Time: 7/28/2022 11:50 AM  Performed by: Clementina Fitzgerald PA-C  Authorized by: Clementina Fitzgerald PA-C   Universal Protocol:  Procedure performed by:  Consent given by: patient        Patient location:  Clinic  Location:     Laterality:  Left    Location:  Upper extremity    Upper extremity location:  Arm    Arm location:  L upper arm  Procedure details: Tools used:  Suture removal kit    Wound appearance:  Good wound healing, no sign(s) of infection, clean and pink    Number of sutures removed:  9  Post-procedure details:     Patient tolerance of procedure:   Tolerated well, no immediate complications

## 2022-08-02 ENCOUNTER — OFFICE VISIT (OUTPATIENT)
Dept: OBGYN CLINIC | Facility: CLINIC | Age: 47
End: 2022-08-02

## 2022-08-02 VITALS
HEIGHT: 69 IN | SYSTOLIC BLOOD PRESSURE: 127 MMHG | WEIGHT: 214.4 LBS | HEART RATE: 94 BPM | DIASTOLIC BLOOD PRESSURE: 74 MMHG | BODY MASS INDEX: 31.76 KG/M2 | RESPIRATION RATE: 16 BRPM

## 2022-08-02 DIAGNOSIS — S41.111A LACERATION OF RIGHT UPPER EXTREMITY, INITIAL ENCOUNTER: Primary | ICD-10-CM

## 2022-08-02 PROCEDURE — 99024 POSTOP FOLLOW-UP VISIT: CPT | Performed by: PHYSICIAN ASSISTANT

## 2022-08-02 NOTE — PROGRESS NOTES
Assessment/Plan:  Patient ID: Kali Reilly 51 y o  male   Surgery: Debridement Upper Extremity (wash Out) - Right, EDC muscle belly repair to the ring and small finger  Date of Surgery: 7/20/2022    · Patient was placed into a modified ulnar gutter cast with the MPs in slight extension and wrist in slight extension  He tolerated procedure well  · Cast precautions were given  · It was discussed with the patient that he has a court appearance at the end of the month which could indicate possibly half-way time  · It was discussed with Dr Yancy Pena about treatment for this patient given his prognosis as well as future access to physical therapy  It is recommended that he was placed into a modified ulnar gutter cast to allow the IP joints to move  He will follow up in 3 weeks for re-evaluation, removal of cast and evaluation of his range of motion          CHIEF COMPLAINT:  Chief Complaint   Patient presents with    Right Forearm - Post-op    Left Forearm - Pain         SUBJECTIVE:  Kali Reilly is a 51y o  year old male who presents for follow up after Debridement Upper Extremity (wash Out) - Right  Today patient has pain over his forearm  He states that he has seen his PCP a few times of which dry dressing changes have been performed  He has been wearing the splint that was provided after surgery all the time  He states that he will have a constant all achy throbbing and burning pain over his forearm  He notes the inability to actively extend the ring and small finger         PHYSICAL EXAMINATION:  General: well developed and well nourished, alert, oriented times 3 and appears comfortable  Psychiatric: Normal    MUSCULOSKELETAL EXAMINATION:  Incision: Clean, dry, intact  Surgery Site: normal, no evidence of infection   Range of Motion:  Limited range of motion of the ring and small finger with extension at the MP joint  Neurovascular status: Neuro intact, good cap refill  Activity Restrictions:         STUDIES REVIEWED:  No Studies to review      PROCEDURES PERFORMED:  Procedures  No Procedures performed today

## 2022-08-11 ENCOUNTER — OFFICE VISIT (OUTPATIENT)
Dept: FAMILY MEDICINE CLINIC | Facility: CLINIC | Age: 47
End: 2022-08-11
Payer: COMMERCIAL

## 2022-08-11 VITALS
DIASTOLIC BLOOD PRESSURE: 84 MMHG | BODY MASS INDEX: 31.7 KG/M2 | TEMPERATURE: 98 F | WEIGHT: 214 LBS | OXYGEN SATURATION: 96 % | HEIGHT: 69 IN | SYSTOLIC BLOOD PRESSURE: 134 MMHG | HEART RATE: 101 BPM

## 2022-08-11 DIAGNOSIS — N64.4 BREAST TENDERNESS IN MALE: Primary | ICD-10-CM

## 2022-08-11 PROCEDURE — 99214 OFFICE O/P EST MOD 30 MIN: CPT | Performed by: PHYSICIAN ASSISTANT

## 2022-08-11 NOTE — PROGRESS NOTES
Assessment/Plan:    No problem-specific Assessment & Plan notes found for this encounter  Diagnoses and all orders for this visit:    Breast tenderness in male  -     [address] [place] (DIAGNOSTIC); Future  -     Testosterone, free, total; Future  -     Prolactin; Future  -     CBC and differential; Future  -     Progesterone; Future        Subjective:      Patient ID: Edilberto Oliver is a 51 y o  male  Patient presents today for evaluation of breast tenderness  He c/o soreness in his breasts for about 5-6 months  The left has been more painful than the right side  He states it is worse to the touch especially with his seatbelt on  He has not had any changes in medication in the past 5-6 months  He has felt lumps in the left breast that wax and wane in size  Describes as small lumps  No family hx breast CA  Both sides - left worse than right   No nipple discharge or bleeding or cracking skin/rashes  Denies fever or chills     Was on lithium in the past- has not been recently in the past year      The following portions of the patient's history were reviewed and updated as appropriate: current medications, past family history, past medical history, past social history, past surgical history and problem list     Review of Systems   Constitutional: Negative for chills, fatigue and fever  HENT: Negative for congestion, ear pain, sinus pain, sore throat and trouble swallowing  Eyes: Negative for pain, discharge and redness  Respiratory: Negative for cough, chest tightness, shortness of breath and wheezing  Cardiovascular: Negative for chest pain, palpitations and leg swelling  Gastrointestinal: Negative for abdominal pain, diarrhea, nausea and vomiting  Musculoskeletal: Negative for arthralgias, joint swelling and myalgias  Skin: Negative for rash  Breast soreness   Neurological: Negative for dizziness, weakness, numbness and headaches           Objective:      /84 (BP Location: Left arm, Patient Position: Sitting, Cuff Size: Standard)   Pulse 101   Temp 98 °F (36 7 °C)   Ht 5' 9" (1 753 m)   Wt 97 1 kg (214 lb)   SpO2 96%   BMI 31 60 kg/m²          Physical Exam  Constitutional:       General: He is not in acute distress  Appearance: He is well-developed  Cardiovascular:      Rate and Rhythm: Normal rate and regular rhythm  Heart sounds: Normal heart sounds  Pulmonary:      Effort: Pulmonary effort is normal       Breath sounds: Normal breath sounds  Chest:   Breasts:      Right: Normal  No inverted nipple, mass, nipple discharge, skin change, tenderness or axillary adenopathy  Left: Tenderness present  No inverted nipple, mass, nipple discharge, skin change or axillary adenopathy  Lymphadenopathy:      Upper Body:      Right upper body: No axillary adenopathy  Left upper body: No axillary adenopathy

## 2022-08-15 ENCOUNTER — APPOINTMENT (OUTPATIENT)
Dept: LAB | Facility: CLINIC | Age: 47
End: 2022-08-15
Payer: COMMERCIAL

## 2022-08-15 DIAGNOSIS — N64.4 BREAST TENDERNESS IN MALE: ICD-10-CM

## 2022-08-15 LAB
BASOPHILS # BLD AUTO: 0.04 THOUSANDS/ΜL (ref 0–0.1)
BASOPHILS NFR BLD AUTO: 1 % (ref 0–1)
CHOLEST SERPL-MCNC: 160 MG/DL
EOSINOPHIL # BLD AUTO: 0.09 THOUSAND/ΜL (ref 0–0.61)
EOSINOPHIL NFR BLD AUTO: 2 % (ref 0–6)
ERYTHROCYTE [DISTWIDTH] IN BLOOD BY AUTOMATED COUNT: 14.2 % (ref 11.6–15.1)
HCT VFR BLD AUTO: 38.7 % (ref 36.5–49.3)
HDLC SERPL-MCNC: 60 MG/DL
HGB BLD-MCNC: 12.4 G/DL (ref 12–17)
IMM GRANULOCYTES # BLD AUTO: 0.01 THOUSAND/UL (ref 0–0.2)
IMM GRANULOCYTES NFR BLD AUTO: 0 % (ref 0–2)
LDLC SERPL CALC-MCNC: 85 MG/DL (ref 0–100)
LYMPHOCYTES # BLD AUTO: 1.04 THOUSANDS/ΜL (ref 0.6–4.47)
LYMPHOCYTES NFR BLD AUTO: 25 % (ref 14–44)
MCH RBC QN AUTO: 28.4 PG (ref 26.8–34.3)
MCHC RBC AUTO-ENTMCNC: 32 G/DL (ref 31.4–37.4)
MCV RBC AUTO: 89 FL (ref 82–98)
MONOCYTES # BLD AUTO: 0.48 THOUSAND/ΜL (ref 0.17–1.22)
MONOCYTES NFR BLD AUTO: 12 % (ref 4–12)
NEUTROPHILS # BLD AUTO: 2.45 THOUSANDS/ΜL (ref 1.85–7.62)
NEUTS SEG NFR BLD AUTO: 60 % (ref 43–75)
NONHDLC SERPL-MCNC: 100 MG/DL
NRBC BLD AUTO-RTO: 0 /100 WBCS
PLATELET # BLD AUTO: 138 THOUSANDS/UL (ref 149–390)
PMV BLD AUTO: 10.1 FL (ref 8.9–12.7)
PROGEST SERPL-MCNC: 0.4 NG/ML (ref 0.2–1.97)
PROLACTIN SERPL-MCNC: 4 NG/ML (ref 2.5–17.4)
RBC # BLD AUTO: 4.37 MILLION/UL (ref 3.88–5.62)
TRIGL SERPL-MCNC: 75 MG/DL
WBC # BLD AUTO: 4.11 THOUSAND/UL (ref 4.31–10.16)

## 2022-08-15 PROCEDURE — 84403 ASSAY OF TOTAL TESTOSTERONE: CPT

## 2022-08-15 PROCEDURE — 84146 ASSAY OF PROLACTIN: CPT

## 2022-08-15 PROCEDURE — 84144 ASSAY OF PROGESTERONE: CPT

## 2022-08-15 PROCEDURE — 85025 COMPLETE CBC W/AUTO DIFF WBC: CPT

## 2022-08-15 PROCEDURE — 84402 ASSAY OF FREE TESTOSTERONE: CPT

## 2022-08-16 LAB
TESTOST FREE SERPL-MCNC: 10.7 PG/ML (ref 6.8–21.5)
TESTOST SERPL-MCNC: 843 NG/DL (ref 264–916)

## 2022-08-24 ENCOUNTER — OFFICE VISIT (OUTPATIENT)
Dept: OBGYN CLINIC | Facility: CLINIC | Age: 47
End: 2022-08-24

## 2022-08-24 VITALS
SYSTOLIC BLOOD PRESSURE: 104 MMHG | BODY MASS INDEX: 32.29 KG/M2 | DIASTOLIC BLOOD PRESSURE: 63 MMHG | HEART RATE: 102 BPM | HEIGHT: 69 IN | WEIGHT: 218 LBS

## 2022-08-24 DIAGNOSIS — S41.111D LACERATION OF ARM, RIGHT, MULTIPLE SITES, WITH TENDON INVOLVEMENT, SUBSEQUENT ENCOUNTER: ICD-10-CM

## 2022-08-24 DIAGNOSIS — S41.111D LACERATION OF RIGHT UPPER EXTREMITY, SUBSEQUENT ENCOUNTER: Primary | ICD-10-CM

## 2022-08-24 DIAGNOSIS — S46.921D LACERATION OF ARM, RIGHT, MULTIPLE SITES, WITH TENDON INVOLVEMENT, SUBSEQUENT ENCOUNTER: ICD-10-CM

## 2022-08-24 PROCEDURE — 99024 POSTOP FOLLOW-UP VISIT: CPT | Performed by: PHYSICIAN ASSISTANT

## 2022-08-24 NOTE — PROGRESS NOTES
Assessment/Plan:  Patient ID: Melissa Luis 51 y o  male   Surgery: Debridement Upper Extremity (wash Out) - Right  Date of Surgery: 7/20/2022    Cast was removed today   Patient does still have extensor lag of the ring and small fingers, but is able to straighten the fingers from a flexed position   Recommended extension splint and OT, however patient is going to half-way in 4 days and does not want a brace  He was advised to avoid tight  and flexion of the MP joints  Work on ROM of the IP joints  He would like to follow up when he is out of half-way to discuss if further treatment is necessary       Follow Up:  PRN    To Do Next Visit:         CHIEF COMPLAINT:  Chief Complaint   Patient presents with    Right Wrist - Follow-up, Cast Removal         SUBJECTIVE:  Melissa Luis is a 51y o  year old male who presents for follow up after Debridement Upper Extremity (wash Out) - Right  Today patient has no pain but notes stiffness at the MP joints and extensor lags of the small and ring fingers  PHYSICAL EXAMINATION:  General: well developed and well nourished, alert, oriented times 3 and appears comfortable  Psychiatric: Normal    MUSCULOSKELETAL EXAMINATION:  Incision: Clean, dry, intact and healed  Surgery Site: normal, no evidence of infection    Range of Motion: As expected, extension lag of small and ring fingers   able to actively extend digits from flexed position   Neurovascular status: Neuro intact, good cap refill  Activity Restrictions: avoid tight  and MP flexion at small and ring fingers          STUDIES REVIEWED:  No Studies to review      PROCEDURES PERFORMED:  Procedures  No Procedures performed today      Carrillo Grant PA-C

## 2022-10-11 PROBLEM — S41.111A LACERATION OF RIGHT UPPER EXTREMITY: Status: RESOLVED | Noted: 2022-07-20 | Resolved: 2022-10-11

## 2022-10-11 PROBLEM — S41.112A LACERATION OF LEFT UPPER EXTREMITY: Status: RESOLVED | Noted: 2022-07-20 | Resolved: 2022-10-11

## 2022-10-11 PROBLEM — S01.01XA SCALP LACERATION: Status: RESOLVED | Noted: 2022-07-20 | Resolved: 2022-10-11

## 2022-11-07 ENCOUNTER — APPOINTMENT (OUTPATIENT)
Dept: OCCUPATIONAL THERAPY | Facility: CLINIC | Age: 47
End: 2022-11-07

## 2022-11-14 ENCOUNTER — EVALUATION (OUTPATIENT)
Dept: OCCUPATIONAL THERAPY | Facility: CLINIC | Age: 47
End: 2022-11-14

## 2022-11-14 DIAGNOSIS — R29.898 RIGHT HAND WEAKNESS: Primary | ICD-10-CM
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
## 2023-09-11 PROBLEM — Z13.220 LIPID SCREENING: Status: RESOLVED | Noted: 2019-11-06 | Resolved: 2023-09-11
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
## 2023-09-11 PROBLEM — K62.5 RECTAL BLEEDING: Status: RESOLVED | Noted: 2019-11-06 | Resolved: 2023-09-11

## 2023-09-11 PROBLEM — F10.90 ALCOHOL DRINKING PROBLEM: Status: RESOLVED | Noted: 2022-07-21 | Resolved: 2023-09-11

## 2023-09-11 PROBLEM — R04.0 EPISTAXIS: Status: RESOLVED | Noted: 2019-11-06 | Resolved: 2023-09-11
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
## 2023-09-12 ENCOUNTER — OFFICE VISIT (OUTPATIENT)
Dept: FAMILY MEDICINE CLINIC | Facility: CLINIC | Age: 48
End: 2023-09-12
Payer: COMMERCIAL

## 2023-09-12 VITALS
DIASTOLIC BLOOD PRESSURE: 90 MMHG | OXYGEN SATURATION: 98 % | WEIGHT: 219 LBS | HEIGHT: 69 IN | BODY MASS INDEX: 32.44 KG/M2 | HEART RATE: 97 BPM | TEMPERATURE: 97.8 F | SYSTOLIC BLOOD PRESSURE: 138 MMHG

## 2023-09-12 DIAGNOSIS — Z00.00 HEALTHCARE MAINTENANCE: ICD-10-CM

## 2023-09-12 DIAGNOSIS — B19.20 HEPATITIS C VIRUS INFECTION WITHOUT HEPATIC COMA, UNSPECIFIED CHRONICITY: ICD-10-CM

## 2023-09-12 DIAGNOSIS — Z13.220 SCREENING, LIPID: ICD-10-CM

## 2023-09-12 DIAGNOSIS — R10.9 FLANK PAIN: ICD-10-CM

## 2023-09-12 DIAGNOSIS — R11.0 NAUSEA: ICD-10-CM

## 2023-09-12 DIAGNOSIS — K42.9 UMBILICAL HERNIA WITHOUT OBSTRUCTION AND WITHOUT GANGRENE: ICD-10-CM

## 2023-09-12 DIAGNOSIS — B35.1 ONYCHOMYCOSIS: ICD-10-CM

## 2023-09-12 DIAGNOSIS — R10.13 EPIGASTRIC PAIN: Primary | ICD-10-CM

## 2023-09-12 PROCEDURE — 99396 PREV VISIT EST AGE 40-64: CPT | Performed by: FAMILY MEDICINE

## 2023-09-12 PROCEDURE — 99214 OFFICE O/P EST MOD 30 MIN: CPT | Performed by: FAMILY MEDICINE

## 2023-09-12 RX ORDER — VORTIOXETINE 20 MG/1
TABLET, FILM COATED ORAL
COMMUNITY
Start: 2023-08-30 | End: 2023-09-12

## 2023-09-12 RX ORDER — PANTOPRAZOLE SODIUM 40 MG/1
40 TABLET, DELAYED RELEASE ORAL DAILY
Qty: 30 TABLET | Refills: 1 | Status: SHIPPED | OUTPATIENT
Start: 2023-09-12

## 2023-09-13 ENCOUNTER — APPOINTMENT (OUTPATIENT)
Dept: LAB | Facility: CLINIC | Age: 48
End: 2023-09-13
Payer: COMMERCIAL

## 2023-09-13 DIAGNOSIS — R10.9 FLANK PAIN: ICD-10-CM

## 2023-09-13 DIAGNOSIS — R10.13 EPIGASTRIC PAIN: ICD-10-CM

## 2023-09-13 DIAGNOSIS — Z13.220 SCREENING, LIPID: ICD-10-CM

## 2023-09-13 LAB
ALBUMIN SERPL BCP-MCNC: 4 G/DL (ref 3.5–5)
ALP SERPL-CCNC: 59 U/L (ref 34–104)
ALT SERPL W P-5'-P-CCNC: 103 U/L (ref 7–52)
ANION GAP SERPL CALCULATED.3IONS-SCNC: 11 MMOL/L
AST SERPL W P-5'-P-CCNC: 148 U/L (ref 13–39)
BACTERIA UR QL AUTO: NORMAL /HPF
BASOPHILS # BLD AUTO: 0.02 THOUSANDS/ÂΜL (ref 0–0.1)
BASOPHILS NFR BLD AUTO: 1 % (ref 0–1)
BILIRUB SERPL-MCNC: 1.29 MG/DL (ref 0.2–1)
BILIRUB UR QL STRIP: NEGATIVE
BUN SERPL-MCNC: 7 MG/DL (ref 5–25)
CALCIUM SERPL-MCNC: 8.7 MG/DL (ref 8.4–10.2)
CHLORIDE SERPL-SCNC: 105 MMOL/L (ref 96–108)
CHOLEST SERPL-MCNC: 136 MG/DL
CLARITY UR: CLEAR
CO2 SERPL-SCNC: 23 MMOL/L (ref 21–32)
COLOR UR: YELLOW
CREAT SERPL-MCNC: 0.63 MG/DL (ref 0.6–1.3)
EOSINOPHIL # BLD AUTO: 0.07 THOUSAND/ÂΜL (ref 0–0.61)
EOSINOPHIL NFR BLD AUTO: 2 % (ref 0–6)
ERYTHROCYTE [DISTWIDTH] IN BLOOD BY AUTOMATED COUNT: 14 % (ref 11.6–15.1)
GFR SERPL CREATININE-BSD FRML MDRD: 115 ML/MIN/1.73SQ M
GLUCOSE P FAST SERPL-MCNC: 111 MG/DL (ref 65–99)
GLUCOSE UR STRIP-MCNC: NEGATIVE MG/DL
HCT VFR BLD AUTO: 45.2 % (ref 36.5–49.3)
HDLC SERPL-MCNC: 43 MG/DL
HGB BLD-MCNC: 14.9 G/DL (ref 12–17)
HGB UR QL STRIP.AUTO: NEGATIVE
IMM GRANULOCYTES # BLD AUTO: 0.01 THOUSAND/UL (ref 0–0.2)
IMM GRANULOCYTES NFR BLD AUTO: 0 % (ref 0–2)
KETONES UR STRIP-MCNC: NEGATIVE MG/DL
LDLC SERPL CALC-MCNC: 78 MG/DL (ref 0–100)
LEUKOCYTE ESTERASE UR QL STRIP: NEGATIVE
LIPASE SERPL-CCNC: 61 U/L (ref 11–82)
LYMPHOCYTES # BLD AUTO: 0.94 THOUSANDS/ÂΜL (ref 0.6–4.47)
LYMPHOCYTES NFR BLD AUTO: 26 % (ref 14–44)
MCH RBC QN AUTO: 29.4 PG (ref 26.8–34.3)
MCHC RBC AUTO-ENTMCNC: 33 G/DL (ref 31.4–37.4)
MCV RBC AUTO: 89 FL (ref 82–98)
MONOCYTES # BLD AUTO: 0.49 THOUSAND/ÂΜL (ref 0.17–1.22)
MONOCYTES NFR BLD AUTO: 13 % (ref 4–12)
NEUTROPHILS # BLD AUTO: 2.14 THOUSANDS/ÂΜL (ref 1.85–7.62)
NEUTS SEG NFR BLD AUTO: 58 % (ref 43–75)
NITRITE UR QL STRIP: NEGATIVE
NON-SQ EPI CELLS URNS QL MICRO: NORMAL /HPF
NONHDLC SERPL-MCNC: 93 MG/DL
NRBC BLD AUTO-RTO: 0 /100 WBCS
PH UR STRIP.AUTO: 7 [PH]
PLATELET BLD QL SMEAR: ABNORMAL
PMV BLD AUTO: 10.3 FL (ref 8.9–12.7)
POTASSIUM SERPL-SCNC: 3.3 MMOL/L (ref 3.5–5.3)
PROT SERPL-MCNC: 7.2 G/DL (ref 6.4–8.4)
PROT UR STRIP-MCNC: ABNORMAL MG/DL
RBC # BLD AUTO: 5.07 MILLION/UL (ref 3.88–5.62)
RBC #/AREA URNS AUTO: NORMAL /HPF
RBC MORPH BLD: PRESENT
SODIUM SERPL-SCNC: 139 MMOL/L (ref 135–147)
SP GR UR STRIP.AUTO: 1.01 (ref 1–1.03)
TRIGL SERPL-MCNC: 73 MG/DL
UROBILINOGEN UR STRIP-ACNC: 2 MG/DL
WBC # BLD AUTO: 3.67 THOUSAND/UL (ref 4.31–10.16)
WBC #/AREA URNS AUTO: NORMAL /HPF

## 2023-09-13 PROCEDURE — 87086 URINE CULTURE/COLONY COUNT: CPT

## 2023-09-13 PROCEDURE — 36415 COLL VENOUS BLD VENIPUNCTURE: CPT

## 2023-09-13 PROCEDURE — 83690 ASSAY OF LIPASE: CPT

## 2023-09-13 PROCEDURE — 80053 COMPREHEN METABOLIC PANEL: CPT

## 2023-09-13 PROCEDURE — 85025 COMPLETE CBC W/AUTO DIFF WBC: CPT

## 2023-09-13 PROCEDURE — 81001 URINALYSIS AUTO W/SCOPE: CPT | Performed by: FAMILY MEDICINE

## 2023-09-13 PROCEDURE — 80061 LIPID PANEL: CPT

## 2023-09-14 DIAGNOSIS — R73.01 IFG (IMPAIRED FASTING GLUCOSE): Primary | ICD-10-CM

## 2023-09-14 DIAGNOSIS — D72.819 LEUKOPENIA, UNSPECIFIED TYPE: ICD-10-CM

## 2023-09-14 LAB — BACTERIA UR CULT: NORMAL

## 2023-09-15 ENCOUNTER — TELEPHONE (OUTPATIENT)
Dept: HEMATOLOGY ONCOLOGY | Facility: CLINIC | Age: 48
End: 2023-09-15

## 2023-09-15 NOTE — TELEPHONE ENCOUNTER
I called Jarvis Ennis in response to a referral that was received for patient to establish care with Hematology. Outreach was made to schedule a consultation. I left a voicemail explaining the reason for my call and advised patient to call [facility] [place] at [phone]. Another attempt will be made to contact patient.

## 2023-09-21 ENCOUNTER — TELEPHONE (OUTPATIENT)
Dept: HEMATOLOGY ONCOLOGY | Facility: CLINIC | Age: 48
End: 2023-09-21

## 2023-09-21 NOTE — TELEPHONE ENCOUNTER
I called Lion Spear in response to a referral that was received for patient to establish care with Hematology. Outreach was made to schedule a consultation. A consultation was scheduled for patient during this call.  Patient is scheduled on 10/9/23 at 8:00Am with Dr Elvis Tejeda at the [facility]

## 2023-10-05 ENCOUNTER — CONSULT (OUTPATIENT)
Dept: SURGERY | Facility: CLINIC | Age: 48
End: 2023-10-05
Payer: COMMERCIAL

## 2023-10-05 ENCOUNTER — OFFICE VISIT (OUTPATIENT)
Dept: LAB | Facility: HOSPITAL | Age: 48
End: 2023-10-05
Attending: SURGERY
Payer: COMMERCIAL

## 2023-10-05 VITALS
DIASTOLIC BLOOD PRESSURE: 80 MMHG | WEIGHT: 226.6 LBS | RESPIRATION RATE: 18 BRPM | BODY MASS INDEX: 33.56 KG/M2 | OXYGEN SATURATION: 94 % | SYSTOLIC BLOOD PRESSURE: 142 MMHG | HEART RATE: 112 BPM | HEIGHT: 69 IN | TEMPERATURE: 98.2 F

## 2023-10-05 DIAGNOSIS — K42.9 UMBILICAL HERNIA WITHOUT OBSTRUCTION AND WITHOUT GANGRENE: ICD-10-CM

## 2023-10-05 DIAGNOSIS — R11.0 NAUSEA: ICD-10-CM

## 2023-10-05 LAB
ATRIAL RATE: 102 BPM
P AXIS: 52 DEGREES
PR INTERVAL: 142 MS
QRS AXIS: 25 DEGREES
QRSD INTERVAL: 102 MS
QT INTERVAL: 380 MS
QTC INTERVAL: 495 MS
T WAVE AXIS: 8 DEGREES
VENTRICULAR RATE: 102 BPM

## 2023-10-05 PROCEDURE — 99203 OFFICE O/P NEW LOW 30 MIN: CPT | Performed by: SURGERY

## 2023-10-05 PROCEDURE — 93010 ELECTROCARDIOGRAM REPORT: CPT | Performed by: INTERNAL MEDICINE

## 2023-10-05 PROCEDURE — 93005 ELECTROCARDIOGRAM TRACING: CPT

## 2023-10-05 RX ORDER — SODIUM CHLORIDE, SODIUM LACTATE, POTASSIUM CHLORIDE, CALCIUM CHLORIDE 600; 310; 30; 20 MG/100ML; MG/100ML; MG/100ML; MG/100ML
125 INJECTION, SOLUTION INTRAVENOUS
OUTPATIENT
Start: 2023-10-05 | End: 2023-10-06

## 2023-10-05 RX ORDER — PANTOPRAZOLE SODIUM 40 MG/1
40 TABLET, DELAYED RELEASE ORAL DAILY
Qty: 90 TABLET | Refills: 1 | Status: SHIPPED | OUTPATIENT
Start: 2023-10-05

## 2023-10-05 RX ORDER — MIRTAZAPINE 30 MG/1
30 TABLET, FILM COATED ORAL
COMMUNITY
Start: 2023-09-25

## 2023-10-05 RX ORDER — HEPARIN SODIUM 5000 [USP'U]/ML
5000 INJECTION, SOLUTION INTRAVENOUS; SUBCUTANEOUS
OUTPATIENT
Start: 2023-10-05 | End: 2023-10-06
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
## 2023-10-09 ENCOUNTER — OFFICE VISIT (OUTPATIENT)
Dept: HEMATOLOGY ONCOLOGY | Facility: CLINIC | Age: 48
End: 2023-10-09
Payer: COMMERCIAL

## 2023-10-09 ENCOUNTER — APPOINTMENT (OUTPATIENT)
Dept: LAB | Facility: HOSPITAL | Age: 48
End: 2023-10-09
Payer: COMMERCIAL

## 2023-10-09 VITALS
WEIGHT: 220 LBS | SYSTOLIC BLOOD PRESSURE: 148 MMHG | BODY MASS INDEX: 32.58 KG/M2 | TEMPERATURE: 99.6 F | RESPIRATION RATE: 18 BRPM | DIASTOLIC BLOOD PRESSURE: 82 MMHG | HEART RATE: 126 BPM | HEIGHT: 69 IN | OXYGEN SATURATION: 95 %

## 2023-10-09 DIAGNOSIS — D72.819 LEUKOPENIA, UNSPECIFIED TYPE: ICD-10-CM

## 2023-10-09 DIAGNOSIS — R73.01 IFG (IMPAIRED FASTING GLUCOSE): ICD-10-CM

## 2023-10-09 LAB
ALBUMIN SERPL BCP-MCNC: 4 G/DL (ref 3.5–5)
ALP SERPL-CCNC: 83 U/L (ref 34–104)
ALT SERPL W P-5'-P-CCNC: 79 U/L (ref 7–52)
ANION GAP SERPL CALCULATED.3IONS-SCNC: 5 MMOL/L
AST SERPL W P-5'-P-CCNC: 118 U/L (ref 13–39)
BILIRUB SERPL-MCNC: 1.2 MG/DL (ref 0.2–1)
BUN SERPL-MCNC: 8 MG/DL (ref 5–25)
CALCIUM SERPL-MCNC: 9.1 MG/DL (ref 8.4–10.2)
CHLORIDE SERPL-SCNC: 110 MMOL/L (ref 96–108)
CO2 SERPL-SCNC: 23 MMOL/L (ref 21–32)
CREAT SERPL-MCNC: 0.64 MG/DL (ref 0.6–1.3)
CREAT UR-MCNC: 103.3 MG/DL
ERYTHROCYTE [DISTWIDTH] IN BLOOD BY AUTOMATED COUNT: 14.4 % (ref 11.6–15.1)
EST. AVERAGE GLUCOSE BLD GHB EST-MCNC: 131 MG/DL
GFR SERPL CREATININE-BSD FRML MDRD: 114 ML/MIN/1.73SQ M
GLUCOSE P FAST SERPL-MCNC: 218 MG/DL (ref 65–99)
HBA1C MFR BLD: 6.2 %
HCT VFR BLD AUTO: 45.6 % (ref 36.5–49.3)
HGB BLD-MCNC: 15.3 G/DL (ref 12–17)
IMM EOSINOPHIL NFR BLD MANUAL: 2 % (ref 0–6)
LYMPHOCYTES NFR BLD: 12 % (ref 14–44)
MCH RBC QN AUTO: 29.5 PG (ref 26.8–34.3)
MCHC RBC AUTO-ENTMCNC: 33.6 G/DL (ref 31.4–37.4)
MCV RBC AUTO: 88 FL (ref 82–98)
MICROALBUMIN UR-MCNC: 30.3 MG/L
MICROALBUMIN/CREAT 24H UR: 29 MG/G CREATININE (ref 0–30)
MONOCYTES NFR BLD AUTO: 10 % (ref 4–12)
NEUTS BAND NFR BLD MANUAL: 1 THOUSAND/UL
NEUTS SEG NFR BLD AUTO: 73 % (ref 45–77)
NRBC BLD AUTO-RTO: 0 /100 WBCS
PLATELET # BLD AUTO: 98 THOUSANDS/UL (ref 149–390)
PMV BLD AUTO: 9.6 FL (ref 8.9–12.7)
POTASSIUM SERPL-SCNC: 3.8 MMOL/L (ref 3.5–5.3)
PROT SERPL-MCNC: 7.4 G/DL (ref 6.4–8.4)
RBC # BLD AUTO: 5.18 MILLION/UL (ref 3.88–5.62)
SODIUM SERPL-SCNC: 138 MMOL/L (ref 135–147)
TOTAL CELLS COUNTED SPEC: 100
VARIANT LYMPHS BLD QL SMEAR: 2 % (ref 0–0)
WBC # BLD AUTO: 4.62 THOUSAND/UL (ref 4.31–10.16)

## 2023-10-09 PROCEDURE — 36415 COLL VENOUS BLD VENIPUNCTURE: CPT

## 2023-10-09 PROCEDURE — 85007 BL SMEAR W/DIFF WBC COUNT: CPT

## 2023-10-09 PROCEDURE — 82043 UR ALBUMIN QUANTITATIVE: CPT

## 2023-10-09 PROCEDURE — 80053 COMPREHEN METABOLIC PANEL: CPT

## 2023-10-09 PROCEDURE — 83036 HEMOGLOBIN GLYCOSYLATED A1C: CPT

## 2023-10-09 PROCEDURE — 82570 ASSAY OF URINE CREATININE: CPT

## 2023-10-09 PROCEDURE — 99204 OFFICE O/P NEW MOD 45 MIN: CPT | Performed by: INTERNAL MEDICINE
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
## 2023-10-12 ENCOUNTER — APPOINTMENT (OUTPATIENT)
Dept: PREADMISSION TESTING | Facility: HOSPITAL | Age: 48
End: 2023-10-12
Payer: COMMERCIAL

## 2023-10-13 ENCOUNTER — ANESTHESIA EVENT (OUTPATIENT)
Dept: PERIOP | Facility: HOSPITAL | Age: 48
End: 2023-10-13
Payer: COMMERCIAL

## 2023-10-16 NOTE — PRE-PROCEDURE INSTRUCTIONS
Pre-Surgery Instructions:   Medication Instructions    clonazePAM (KlonoPIN) 0.5 mg tablet May use day of surgery if needed      mirtazapine (REMERON) 30 mg tablet Normally takes at night. pantoprazole (PROTONIX) 40 mg tablet Take this medication day of surgery if normally taken in the morning. traZODone (DESYREL) 150 mg tablet Normally takes at night. Medication instructions for day surgery reviewed. Please use only a sip of water to take your instructed medications. Avoid all over the counter vitamins, supplements and NSAIDS for one week prior to surgery per anesthesia guidelines. Tylenol is ok to take as needed. You will receive a call one business day prior to surgery with an arrival time and hospital directions. If your surgery is scheduled on a Monday, the hospital will be calling you on the Friday prior to your surgery. If you have not heard from anyone by 8pm, please call the hospital supervisor through the hospital  at [phone]. [place] [address] [phone]). Do not eat or drink anything after midnight the night before your surgery, including candy, mints, lifesavers, or chewing gum. Do not drink alcohol 24hrs before your surgery. Try not to smoke at least 24hrs before your surgery. Follow the pre surgery showering instructions as listed in the [facility] Surgical Experience Booklet” or otherwise provided by your surgeon's office. Do not shave the surgical area 24 hours before surgery. Do not apply any lotions, creams, including makeup, cologne, deodorant, or perfumes after showering on the day of your surgery. No contact lenses, eye make-up, or artificial eyelashes. Remove nail polish, including gel polish, and any artificial, gel, or acrylic nails if possible. Remove all jewelry including rings and body piercing jewelry. Wear causal clothing that is easy to take on and off. Consider your type of surgery. Keep any valuables, jewelry, piercings at home.  Please bring any specially ordered equipment (sling, braces) if indicated. Arrange for a responsible person to drive you to and from the hospital on the day of your surgery. Visitor Guidelines discussed. Call the surgeon's office with any new illnesses, exposures, or additional questions prior to surgery. Please reference your [facility] Surgical Experience Booklet” for additional information to prepare for your upcoming surgery.

## 2023-10-19 ENCOUNTER — APPOINTMENT (OUTPATIENT)
Dept: LAB | Facility: HOSPITAL | Age: 48
End: 2023-10-19
Payer: COMMERCIAL

## 2023-10-19 ENCOUNTER — OFFICE VISIT (OUTPATIENT)
Dept: HEMATOLOGY ONCOLOGY | Facility: CLINIC | Age: 48
End: 2023-10-19
Payer: COMMERCIAL

## 2023-10-19 VITALS
DIASTOLIC BLOOD PRESSURE: 82 MMHG | SYSTOLIC BLOOD PRESSURE: 142 MMHG | HEART RATE: 113 BPM | RESPIRATION RATE: 18 BRPM | WEIGHT: 220 LBS | TEMPERATURE: 102.7 F | HEIGHT: 69 IN | OXYGEN SATURATION: 95 % | BODY MASS INDEX: 32.58 KG/M2

## 2023-10-19 DIAGNOSIS — D69.6 THROMBOCYTOPENIA (HCC): ICD-10-CM

## 2023-10-19 DIAGNOSIS — D69.6 THROMBOCYTOPENIA (HCC): Primary | ICD-10-CM

## 2023-10-19 LAB
BASOPHILS # BLD AUTO: 0.02 THOUSANDS/ÂΜL (ref 0–0.1)
BASOPHILS NFR BLD AUTO: 0 % (ref 0–1)
EOSINOPHIL # BLD AUTO: 0.04 THOUSAND/ÂΜL (ref 0–0.61)
EOSINOPHIL NFR BLD AUTO: 1 % (ref 0–6)
ERYTHROCYTE [DISTWIDTH] IN BLOOD BY AUTOMATED COUNT: 14.7 % (ref 11.6–15.1)
HCT VFR BLD AUTO: 42.4 % (ref 36.5–49.3)
HGB BLD-MCNC: 14.2 G/DL (ref 12–17)
IMM GRANULOCYTES # BLD AUTO: 0.03 THOUSAND/UL (ref 0–0.2)
IMM GRANULOCYTES NFR BLD AUTO: 1 % (ref 0–2)
LYMPHOCYTES # BLD AUTO: 0.68 THOUSANDS/ÂΜL (ref 0.6–4.47)
LYMPHOCYTES NFR BLD AUTO: 13 % (ref 14–44)
MCH RBC QN AUTO: 29.4 PG (ref 26.8–34.3)
MCHC RBC AUTO-ENTMCNC: 33.5 G/DL (ref 31.4–37.4)
MCV RBC AUTO: 88 FL (ref 82–98)
MONOCYTES # BLD AUTO: 0.56 THOUSAND/ÂΜL (ref 0.17–1.22)
MONOCYTES NFR BLD AUTO: 11 % (ref 4–12)
NEUTROPHILS # BLD AUTO: 3.81 THOUSANDS/ÂΜL (ref 1.85–7.62)
NEUTS SEG NFR BLD AUTO: 74 % (ref 43–75)
NRBC BLD AUTO-RTO: 0 /100 WBCS
PLATELET # BLD AUTO: 87 THOUSANDS/UL (ref 149–390)
PMV BLD AUTO: 9.6 FL (ref 8.9–12.7)
RBC # BLD AUTO: 4.83 MILLION/UL (ref 3.88–5.62)
WBC # BLD AUTO: 5.14 THOUSAND/UL (ref 4.31–10.16)

## 2023-10-19 PROCEDURE — 85025 COMPLETE CBC W/AUTO DIFF WBC: CPT

## 2023-10-19 PROCEDURE — 99204 OFFICE O/P NEW MOD 45 MIN: CPT | Performed by: INTERNAL MEDICINE

## 2023-10-19 PROCEDURE — 36415 COLL VENOUS BLD VENIPUNCTURE: CPT
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
## 2023-10-22 ENCOUNTER — HOSPITAL ENCOUNTER (OUTPATIENT)
Dept: ULTRASOUND IMAGING | Facility: HOSPITAL | Age: 48
Discharge: HOME | End: 2023-10-22
Payer: COMMERCIAL

## 2023-10-22 DIAGNOSIS — R10.13 EPIGASTRIC PAIN: ICD-10-CM

## 2023-10-22 PROCEDURE — 76705 ECHO EXAM OF ABDOMEN: CPT

## 2023-10-24 ENCOUNTER — APPOINTMENT (OUTPATIENT)
Dept: LAB | Facility: CLINIC | Age: 48
End: 2023-10-24
Payer: COMMERCIAL

## 2023-10-24 DIAGNOSIS — K42.9 UMBILICAL HERNIA WITHOUT OBSTRUCTION AND WITHOUT GANGRENE: ICD-10-CM

## 2023-10-24 DIAGNOSIS — K42.9 UMBILICAL HERNIA WITHOUT OBSTRUCTION AND WITHOUT GANGRENE: Primary | ICD-10-CM

## 2023-10-24 LAB
BASOPHILS # BLD AUTO: 0.06 THOUSANDS/ÂΜL (ref 0–0.1)
BASOPHILS NFR BLD AUTO: 1 % (ref 0–1)
EOSINOPHIL # BLD AUTO: 0.12 THOUSAND/ÂΜL (ref 0–0.61)
EOSINOPHIL NFR BLD AUTO: 2 % (ref 0–6)
ERYTHROCYTE [DISTWIDTH] IN BLOOD BY AUTOMATED COUNT: 15.5 % (ref 11.6–15.1)
HCT VFR BLD AUTO: 44.1 % (ref 36.5–49.3)
HGB BLD-MCNC: 14.4 G/DL (ref 12–17)
IMM GRANULOCYTES # BLD AUTO: 0.02 THOUSAND/UL (ref 0–0.2)
IMM GRANULOCYTES NFR BLD AUTO: 0 % (ref 0–2)
LYMPHOCYTES # BLD AUTO: 1.3 THOUSANDS/ÂΜL (ref 0.6–4.47)
LYMPHOCYTES NFR BLD AUTO: 22 % (ref 14–44)
MCH RBC QN AUTO: 28.7 PG (ref 26.8–34.3)
MCHC RBC AUTO-ENTMCNC: 32.7 G/DL (ref 31.4–37.4)
MCV RBC AUTO: 88 FL (ref 82–98)
MONOCYTES # BLD AUTO: 0.82 THOUSAND/ÂΜL (ref 0.17–1.22)
MONOCYTES NFR BLD AUTO: 14 % (ref 4–12)
NEUTROPHILS # BLD AUTO: 3.65 THOUSANDS/ÂΜL (ref 1.85–7.62)
NEUTS SEG NFR BLD AUTO: 61 % (ref 43–75)
NRBC BLD AUTO-RTO: 0 /100 WBCS
PLATELET # BLD AUTO: 108 THOUSANDS/UL (ref 149–390)
PMV BLD AUTO: 9.8 FL (ref 8.9–12.7)
RBC # BLD AUTO: 5.02 MILLION/UL (ref 3.88–5.62)
WBC # BLD AUTO: 5.97 THOUSAND/UL (ref 4.31–10.16)

## 2023-10-24 PROCEDURE — 85025 COMPLETE CBC W/AUTO DIFF WBC: CPT

## 2023-10-24 PROCEDURE — 36415 COLL VENOUS BLD VENIPUNCTURE: CPT

## 2023-10-25 ENCOUNTER — ANESTHESIA (OUTPATIENT)
Dept: PERIOP | Facility: HOSPITAL | Age: 48
End: 2023-10-25
Payer: COMMERCIAL

## 2023-10-25 ENCOUNTER — HOSPITAL ENCOUNTER (OUTPATIENT)
Facility: HOSPITAL | Age: 48
Setting detail: OUTPATIENT SURGERY
Discharge: HOME | End: 2023-10-25
Attending: SURGERY | Admitting: SURGERY
Payer: COMMERCIAL

## 2023-10-25 ENCOUNTER — APPOINTMENT (OUTPATIENT)
Dept: RADIOLOGY | Facility: MEDICAL CENTER | Age: 48
End: 2023-10-25
Payer: COMMERCIAL

## 2023-10-25 VITALS
DIASTOLIC BLOOD PRESSURE: 94 MMHG | HEIGHT: 69 IN | HEART RATE: 105 BPM | SYSTOLIC BLOOD PRESSURE: 158 MMHG | TEMPERATURE: 98 F | OXYGEN SATURATION: 96 % | WEIGHT: 229.5 LBS | RESPIRATION RATE: 18 BRPM | BODY MASS INDEX: 33.99 KG/M2

## 2023-10-25 DIAGNOSIS — D69.6 THROMBOCYTOPENIA (HCC): Primary | ICD-10-CM

## 2023-10-25 DIAGNOSIS — K42.9 UMBILICAL HERNIA WITHOUT OBSTRUCTION AND WITHOUT GANGRENE: ICD-10-CM

## 2023-10-25 DIAGNOSIS — K42.0 INCARCERATED UMBILICAL HERNIA: ICD-10-CM

## 2023-10-25 PROCEDURE — 49592 RPR AA HRN 1ST < 3 NCR/STRN: CPT | Performed by: SURGERY

## 2023-10-25 PROCEDURE — 49592 RPR AA HRN 1ST < 3 NCR/STRN: CPT | Performed by: PHYSICIAN ASSISTANT

## 2023-10-25 PROCEDURE — C1781 MESH (IMPLANTABLE): HCPCS | Performed by: SURGERY

## 2023-10-25 DEVICE — ETHICON SECURESTRAP ABSORBABLE FIXATION DEVICE
Type: IMPLANTABLE DEVICE | Site: ABDOMEN | Status: FUNCTIONAL
Brand: ETHICON SECURESTRAP

## 2023-10-25 DEVICE — VENTRALIGHT ST MESH
Type: IMPLANTABLE DEVICE | Site: ABDOMEN | Status: FUNCTIONAL
Brand: VENTRALIGHT ST

## 2023-10-25 RX ORDER — DEXAMETHASONE SODIUM PHOSPHATE 10 MG/ML
INJECTION, SOLUTION INTRAMUSCULAR; INTRAVENOUS AS NEEDED
Status: DISCONTINUED | OUTPATIENT
Start: 2023-10-25 | End: 2023-10-25

## 2023-10-25 RX ORDER — ONDANSETRON 2 MG/ML
INJECTION INTRAMUSCULAR; INTRAVENOUS AS NEEDED
Status: DISCONTINUED | OUTPATIENT
Start: 2023-10-25 | End: 2023-10-25

## 2023-10-25 RX ORDER — TRAMADOL HYDROCHLORIDE 50 MG/1
50 TABLET ORAL EVERY 6 HOURS PRN
Status: DISCONTINUED | OUTPATIENT
Start: 2023-10-25 | End: 2023-10-25 | Stop reason: HOSPADM

## 2023-10-25 RX ORDER — SODIUM CHLORIDE, SODIUM LACTATE, POTASSIUM CHLORIDE, CALCIUM CHLORIDE 600; 310; 30; 20 MG/100ML; MG/100ML; MG/100ML; MG/100ML
125 INJECTION, SOLUTION INTRAVENOUS
Status: COMPLETED | OUTPATIENT
Start: 2023-10-25 | End: 2023-10-25

## 2023-10-25 RX ORDER — OXYMETAZOLINE HYDROCHLORIDE 0.05 G/100ML
SPRAY NASAL AS NEEDED
Status: DISCONTINUED | OUTPATIENT
Start: 2023-10-25 | End: 2023-10-25

## 2023-10-25 RX ORDER — LIDOCAINE HYDROCHLORIDE 20 MG/ML
INJECTION, SOLUTION EPIDURAL; INFILTRATION; INTRACAUDAL; PERINEURAL AS NEEDED
Status: DISCONTINUED | OUTPATIENT
Start: 2023-10-25 | End: 2023-10-25

## 2023-10-25 RX ORDER — FENTANYL CITRATE 50 UG/ML
INJECTION, SOLUTION INTRAMUSCULAR; INTRAVENOUS AS NEEDED
Status: DISCONTINUED | OUTPATIENT
Start: 2023-10-25 | End: 2023-10-25

## 2023-10-25 RX ORDER — ROCURONIUM BROMIDE 10 MG/ML
INJECTION, SOLUTION INTRAVENOUS AS NEEDED
Status: DISCONTINUED | OUTPATIENT
Start: 2023-10-25 | End: 2023-10-25

## 2023-10-25 RX ORDER — ONDANSETRON 2 MG/ML
4 INJECTION INTRAMUSCULAR; INTRAVENOUS ONCE AS NEEDED
Status: DISCONTINUED | OUTPATIENT
Start: 2023-10-25 | End: 2023-10-25 | Stop reason: HOSPADM

## 2023-10-25 RX ORDER — HYDROMORPHONE HCL/PF 1 MG/ML
0.5 SYRINGE (ML) INJECTION
Status: DISCONTINUED | OUTPATIENT
Start: 2023-10-25 | End: 2023-10-25 | Stop reason: HOSPADM

## 2023-10-25 RX ORDER — CEFAZOLIN SODIUM 2 G/50ML
2000 SOLUTION INTRAVENOUS ONCE
Status: COMPLETED | OUTPATIENT
Start: 2023-10-25 | End: 2023-10-25

## 2023-10-25 RX ORDER — ACETAMINOPHEN AND CODEINE PHOSPHATE 300; 30 MG/1; MG/1
1 TABLET ORAL EVERY 4 HOURS PRN
Qty: 12 TABLET | Refills: 0 | Status: SHIPPED | OUTPATIENT
Start: 2023-10-25 | End: 2023-10-28

## 2023-10-25 RX ORDER — HEPARIN SODIUM 5000 [USP'U]/ML
5000 INJECTION, SOLUTION INTRAVENOUS; SUBCUTANEOUS
Status: COMPLETED | OUTPATIENT
Start: 2023-10-25 | End: 2023-10-25

## 2023-10-25 RX ORDER — MIDAZOLAM HYDROCHLORIDE 2 MG/2ML
INJECTION, SOLUTION INTRAMUSCULAR; INTRAVENOUS AS NEEDED
Status: DISCONTINUED | OUTPATIENT
Start: 2023-10-25 | End: 2023-10-25

## 2023-10-25 RX ORDER — PROPOFOL 10 MG/ML
INJECTION, EMULSION INTRAVENOUS AS NEEDED
Status: DISCONTINUED | OUTPATIENT
Start: 2023-10-25 | End: 2023-10-25

## 2023-10-25 RX ORDER — FENTANYL CITRATE/PF 50 MCG/ML
50 SYRINGE (ML) INJECTION
Status: DISCONTINUED | OUTPATIENT
Start: 2023-10-25 | End: 2023-10-25 | Stop reason: HOSPADM

## 2023-10-25 RX ORDER — MAGNESIUM HYDROXIDE 1200 MG/15ML
LIQUID ORAL AS NEEDED
Status: DISCONTINUED | OUTPATIENT
Start: 2023-10-25 | End: 2023-10-25 | Stop reason: HOSPADM

## 2023-10-25 RX ORDER — SODIUM CHLORIDE, SODIUM LACTATE, POTASSIUM CHLORIDE, CALCIUM CHLORIDE 600; 310; 30; 20 MG/100ML; MG/100ML; MG/100ML; MG/100ML
INJECTION, SOLUTION INTRAVENOUS CONTINUOUS PRN
Status: DISCONTINUED | OUTPATIENT
Start: 2023-10-25 | End: 2023-10-25

## 2023-10-25 RX ORDER — ONDANSETRON 2 MG/ML
4 INJECTION INTRAMUSCULAR; INTRAVENOUS EVERY 8 HOURS PRN
Status: DISCONTINUED | OUTPATIENT
Start: 2023-10-25 | End: 2023-10-25 | Stop reason: HOSPADM

## 2023-10-25 RX ORDER — ALBUTEROL SULFATE 90 UG/1
AEROSOL, METERED RESPIRATORY (INHALATION) AS NEEDED
Status: DISCONTINUED | OUTPATIENT
Start: 2023-10-25 | End: 2023-10-25

## 2023-10-25 RX ORDER — BUPIVACAINE HYDROCHLORIDE 2.5 MG/ML
INJECTION, SOLUTION EPIDURAL; INFILTRATION; INTRACAUDAL AS NEEDED
Status: DISCONTINUED | OUTPATIENT
Start: 2023-10-25 | End: 2023-10-25 | Stop reason: HOSPADM

## 2023-10-25 RX ADMIN — PROPOFOL 200 MG: 10 INJECTION, EMULSION INTRAVENOUS at 10:28

## 2023-10-25 RX ADMIN — FENTANYL CITRATE 50 MCG: 50 INJECTION INTRAMUSCULAR; INTRAVENOUS at 12:00

## 2023-10-25 RX ADMIN — ALBUTEROL SULFATE 4 PUFF: 90 AEROSOL, METERED RESPIRATORY (INHALATION) at 11:29

## 2023-10-25 RX ADMIN — ALBUTEROL SULFATE 15 PUFF: 90 AEROSOL, METERED RESPIRATORY (INHALATION) at 11:05

## 2023-10-25 RX ADMIN — OXYMETAZOLINE HYDROCHLORIDE 2 SPRAY: 0.05 SPRAY NASAL at 11:30

## 2023-10-25 RX ADMIN — SODIUM CHLORIDE, SODIUM LACTATE, POTASSIUM CHLORIDE, AND CALCIUM CHLORIDE: .6; .31; .03; .02 INJECTION, SOLUTION INTRAVENOUS at 10:24

## 2023-10-25 RX ADMIN — SUGAMMADEX 200 MG: 100 INJECTION, SOLUTION INTRAVENOUS at 11:20

## 2023-10-25 RX ADMIN — HEPARIN SODIUM 5000 UNITS: 5000 INJECTION INTRAVENOUS; SUBCUTANEOUS at 09:34

## 2023-10-25 RX ADMIN — DEXAMETHASONE SODIUM PHOSPHATE 10 MG: 10 INJECTION, SOLUTION INTRAMUSCULAR; INTRAVENOUS at 10:29

## 2023-10-25 RX ADMIN — TRAMADOL HYDROCHLORIDE 50 MG: 50 TABLET, COATED ORAL at 13:25

## 2023-10-25 RX ADMIN — SODIUM CHLORIDE 8 MCG: 9 INJECTION, SOLUTION INTRAVENOUS at 10:41

## 2023-10-25 RX ADMIN — MIDAZOLAM HYDROCHLORIDE 2 MG: 1 INJECTION, SOLUTION INTRAMUSCULAR; INTRAVENOUS at 10:24

## 2023-10-25 RX ADMIN — SODIUM CHLORIDE 8 MCG: 9 INJECTION, SOLUTION INTRAVENOUS at 10:24

## 2023-10-25 RX ADMIN — ROCURONIUM BROMIDE 20 MG: 10 INJECTION, SOLUTION INTRAVENOUS at 10:54

## 2023-10-25 RX ADMIN — MORPHINE SULFATE 2 MG: 2 INJECTION, SOLUTION INTRAMUSCULAR; INTRAVENOUS at 13:45

## 2023-10-25 RX ADMIN — FENTANYL CITRATE 50 MCG: 50 INJECTION INTRAMUSCULAR; INTRAVENOUS at 12:08

## 2023-10-25 RX ADMIN — SODIUM CHLORIDE, SODIUM LACTATE, POTASSIUM CHLORIDE, AND CALCIUM CHLORIDE: .6; .31; .03; .02 INJECTION, SOLUTION INTRAVENOUS at 11:38

## 2023-10-25 RX ADMIN — HYDROMORPHONE HYDROCHLORIDE 0.5 MG: 1 INJECTION, SOLUTION INTRAMUSCULAR; INTRAVENOUS; SUBCUTANEOUS at 12:56

## 2023-10-25 RX ADMIN — ONDANSETRON 4 MG: 2 INJECTION INTRAMUSCULAR; INTRAVENOUS at 11:06

## 2023-10-25 RX ADMIN — FENTANYL CITRATE 100 MCG: 0.05 INJECTION, SOLUTION INTRAMUSCULAR; INTRAVENOUS at 11:15

## 2023-10-25 RX ADMIN — ROCURONIUM BROMIDE 50 MG: 10 INJECTION, SOLUTION INTRAVENOUS at 10:29

## 2023-10-25 RX ADMIN — HYDROMORPHONE HYDROCHLORIDE 0.5 MG: 1 INJECTION, SOLUTION INTRAMUSCULAR; INTRAVENOUS; SUBCUTANEOUS at 12:38

## 2023-10-25 RX ADMIN — CEFAZOLIN SODIUM 2000 MG: 2 SOLUTION INTRAVENOUS at 10:32

## 2023-10-25 RX ADMIN — SODIUM CHLORIDE, SODIUM LACTATE, POTASSIUM CHLORIDE, AND CALCIUM CHLORIDE 125 ML/HR: .6; .31; .03; .02 INJECTION, SOLUTION INTRAVENOUS at 09:35

## 2023-10-25 RX ADMIN — PROPOFOL 70 MG: 10 INJECTION, EMULSION INTRAVENOUS at 10:29

## 2023-10-25 RX ADMIN — FENTANYL CITRATE 100 MCG: 0.05 INJECTION, SOLUTION INTRAMUSCULAR; INTRAVENOUS at 10:28

## 2023-10-25 RX ADMIN — LIDOCAINE HYDROCHLORIDE 100 MG: 20 INJECTION, SOLUTION EPIDURAL; INFILTRATION; INTRACAUDAL at 10:28

## 2023-10-25 NOTE — OP NOTE
OPERATIVE REPORT  PATIENT NAME: Kashif Rodriguez    :  1975  MRN: [id number]  Pt Location: [facility] OR ROOM 03    SURGERY DATE: 10/25/2023    Surgeon(s) and Role:     * Juan Antonio Salinas MD - Primary     * Roselia Mckeon PA-C - Assisting    Preop Diagnosis:  Umbilical hernia without obstruction and without gangrene [K42.9]    Post-Op Diagnosis Codes:     * Umbilical hernia without obstruction and without gangrene [K42.9]    Procedure(s): HERNIA REPAIR UMBILICAL LAPAROSCOPIC with mesh    Specimen(s):  None    Estimated Blood Loss:   Minimal    Drains:  None    Anesthesia Type:   General    Operative Indications:  Umbilical hernia without obstruction and without gangrene [K42.9]    Operative Findings:  The patient had incarcerated umbilical hernia with incarceration of preperitoneal fat. Defect measured approximately 2 cm. The rest of the abdominal cavity showed no evidence of inflammatory or neoplastic process. Complications:   None    Procedure and Technique:  The patient was identified and then the patient was taken to the operating room and placed in the operating table in a supine position. After adequate anesthesia induction and satisfactory endotracheal intubation the abdomen was prepped and draped under sterile usual fashion with Chloraprep. Timeout was called the patient was identified as well as the surgical site. An incision was made in the right upper quadrant with scalpel, 5 mm trocar was introdued under direct vision with the help of the Visiport. After verifying the positioning the abdomen was insuflated with CO2. After obtaining adequate pneumoperitoneum the scope was advanced and exploration of the abdomen was performed with the above findings. The patient was placed on Trendelenburg and airplane to the left and then an incision was made on right lower quadrant, 11 mm trocar was placed on the right lower quadrant under direct vision.  5 mm trocar was placed on the left side of the abdomen under direct vision. The hernia sac and contents were dissected with cautery and hook and subsequently removed. 11 cm circular ventral light mesh was placed and tacked to the anterior abdominal wall with 0 Nurolon using transfascial U stitch fashion at the 6 and 12:00 position with the help of the suture Passer. The rest of the mesh edge was tacked up to the anterior abdominal wall with secure strap. No evidence of bleeding was noted from the secure strap then at this point the ports were removed under direct vision without evidence of bleeding from the abdominal wall. The subcutaneous tissue on all incisions were infiltrated with 0.25% of Marcaine and the skin on all incisions were closed with 4-0 Vicryl in a continuous subcuticular fashion. Sterile dressings were applied. At the end of the case instrument, needles and sponges counts were correct. The patient tolerated the procedure well and then he was transferred to recovery room in a stable conditions. I was present for the entire procedure., A qualified resident physician was not available. , and A physician assistant was required during the procedure for retraction, tissue handling, dissection and suturing.     Patient Disposition:  PACU , hemodynamically stable, and extubated and stable        SIGNATURE: Chintan Hernández MD  DATE: October 25, 2023  TIME: 11:11 AM

## 2023-10-25 NOTE — DISCHARGE INSTR - AVS FIRST PAGE
SURGERY INSTRUCTIONS    No diet restriction for this surgery  May shower every day starting tomorrow  Remove plastic dressings in 3 days  Remove strips in 7 days  Nothing should be covering incisions 7 days after surgery  Call office to make an appointment in 2 weeks [phone]  Call office with any issues regarding the surgery  You are encourage to walk as much as possible  No driving, heavy lifting or strenuous exercise for one week  Resume home medications starting today  Resume blood thinners starting tomorrow. (Aspirin, Coumadin, Eliquis, Xarelto)  Apply ice to the incisions. 10 minutes every hour for 2 days  May take Tylenol 3, regular Tylenol or ibuprofen for pain. Alternating narcotics with ibuprofen or Advil will have better pain control.   May take Colace for constipation  If you experience urinary retention, please contact our office to be treated  After LAPAROSCOPIC surgery you may experience shoulder pain that usually resolves in 2-3 days or taking plain Tylenol

## 2023-10-25 NOTE — ANESTHESIA POSTPROCEDURE EVALUATION
Post-Op Assessment Note    CV Status:  Stable  Pain Score: 0    Pain management: adequate     Mental Status:  Alert and awake   Hydration Status:  Euvolemic   PONV Controlled:  Controlled   Airway Patency:  Patent and adequate   Two or more mitigation strategies used for obstructive sleep apnea   Post Op Vitals Reviewed: Yes      Staff: CRNA         No notable events documented.     BP   157/86   Temp 98   Pulse 102   Resp 18   SpO2 94

## 2023-10-25 NOTE — ANESTHESIA PREPROCEDURE EVALUATION
Procedure: HERNIA REPAIR UMBILICAL LAPAROSCOPIC with mesh (Abdomen)    Relevant Problems   ANESTHESIA (within normal limits)      CARDIO (within normal limits)      ENDO (within normal limits)      GI/HEPATIC   (+) Hepatitis C virus infection without hepatic coma      /RENAL (within normal limits)      HEMATOLOGY (within normal limits)      MUSCULOSKELETAL   (+) Chronic bilateral low back pain with bilateral sciatica      NEURO/PSYCH   (+) Chronic bilateral low back pain with bilateral sciatica   (+) Depression with anxiety      PULMONARY (within normal limits)     Daily EtOH use. Physical Exam    Airway    Mallampati score: III  TM Distance: <3 FB  Neck ROM: full     Dental   No notable dental hx     Cardiovascular  Rhythm: regular, Rate: normal, Cardiovascular exam normal    Pulmonary  Pulmonary exam normal Breath sounds clear to auscultation    Other Findings      Anesthesia Plan  ASA Score- 2     Anesthesia Type- general with ASA Monitors. Additional Monitors:     Airway Plan: ETT. Plan Factors-Exercise tolerance (METS): >4 METS. Chart reviewed. EKG reviewed. Imaging results reviewed. Existing labs reviewed. Patient summary reviewed. Patient is a current smoker (Marijuana). Obstructive sleep apnea risk education given perioperatively. Induction- intravenous. Postoperative Plan- Plan for postoperative opioid use. Planned trial extubation    Informed Consent- Anesthetic plan and risks discussed with patient. I personally reviewed this patient with the CRNA. Discussed and agreed on the Anesthesia Plan with the CRNA. Dominga Pack

## 2023-10-31 PROBLEM — K80.20 GALLSTONES: Status: ACTIVE | Noted: 2023-10-31

## 2023-10-31 PROBLEM — K70.30 ALCOHOLIC CIRRHOSIS OF LIVER WITHOUT ASCITES (HCC): Status: ACTIVE | Noted: 2023-10-31

## 2023-11-06 LAB — GLUCOSE SERPL-MCNC: 123 MG/DL (ref 65–140)

## 2023-11-08 ENCOUNTER — OFFICE VISIT (OUTPATIENT)
Dept: SURGERY | Facility: CLINIC | Age: 48
End: 2023-11-08

## 2023-11-08 VITALS
HEIGHT: 69 IN | RESPIRATION RATE: 18 BRPM | WEIGHT: 228.4 LBS | DIASTOLIC BLOOD PRESSURE: 80 MMHG | HEART RATE: 118 BPM | OXYGEN SATURATION: 97 % | SYSTOLIC BLOOD PRESSURE: 138 MMHG | TEMPERATURE: 98.7 F | BODY MASS INDEX: 33.83 KG/M2

## 2023-11-08 DIAGNOSIS — Z48.89 POSTOPERATIVE VISIT: Primary | ICD-10-CM

## 2023-11-08 PROCEDURE — 99024 POSTOP FOLLOW-UP VISIT: CPT | Performed by: SURGERY
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
## 2023-11-10 ENCOUNTER — OFFICE VISIT (OUTPATIENT)
Dept: GASTROENTEROLOGY | Facility: CLINIC | Age: 48
End: 2023-11-10
Payer: COMMERCIAL

## 2023-11-10 VITALS — HEIGHT: 69 IN | RESPIRATION RATE: 18 BRPM | BODY MASS INDEX: 33.92 KG/M2 | WEIGHT: 229 LBS

## 2023-11-10 DIAGNOSIS — R93.5 ABNORMAL ULTRASOUND OF ABDOMEN: ICD-10-CM

## 2023-11-10 DIAGNOSIS — K62.5 RECTAL BLEEDING: ICD-10-CM

## 2023-11-10 DIAGNOSIS — K70.30 ALCOHOLIC CIRRHOSIS OF LIVER WITHOUT ASCITES (HCC): ICD-10-CM

## 2023-11-10 DIAGNOSIS — B19.20 HEPATITIS C VIRUS INFECTION WITHOUT HEPATIC COMA, UNSPECIFIED CHRONICITY: ICD-10-CM

## 2023-11-10 DIAGNOSIS — K80.20 GALLSTONES: ICD-10-CM

## 2023-11-10 DIAGNOSIS — F10.20 ALCOHOLISM (HCC): Primary | ICD-10-CM

## 2023-11-10 DIAGNOSIS — R10.11 RIGHT UPPER QUADRANT PAIN: ICD-10-CM

## 2023-11-10 DIAGNOSIS — K59.09 OTHER CONSTIPATION: ICD-10-CM

## 2023-11-10 PROCEDURE — 99204 OFFICE O/P NEW MOD 45 MIN: CPT | Performed by: INTERNAL MEDICINE

## 2023-11-10 NOTE — PATIENT INSTRUCTIONS
Scheduled date of EGD/Flex Sig (as of today): 12/13/23  Physician performing EGD/Flex Sig: Yg  Location of Flex Sig: [facility]  Instructions reviewed with patient by: Berenice GOERGE  Clearances:
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
## 2023-11-21 ENCOUNTER — OFFICE VISIT (OUTPATIENT)
Dept: SURGERY | Facility: CLINIC | Age: 48
End: 2023-11-21
Payer: COMMERCIAL

## 2023-11-21 VITALS
HEART RATE: 105 BPM | BODY MASS INDEX: 33.77 KG/M2 | OXYGEN SATURATION: 96 % | RESPIRATION RATE: 18 BRPM | WEIGHT: 228 LBS | DIASTOLIC BLOOD PRESSURE: 82 MMHG | SYSTOLIC BLOOD PRESSURE: 122 MMHG | HEIGHT: 69 IN | TEMPERATURE: 98.3 F

## 2023-11-21 DIAGNOSIS — K80.10 CHRONIC CALCULOUS CHOLECYSTITIS: Primary | ICD-10-CM

## 2023-11-21 PROCEDURE — 99213 OFFICE O/P EST LOW 20 MIN: CPT | Performed by: SURGERY

## 2023-11-21 RX ORDER — SODIUM CHLORIDE, SODIUM LACTATE, POTASSIUM CHLORIDE, CALCIUM CHLORIDE 600; 310; 30; 20 MG/100ML; MG/100ML; MG/100ML; MG/100ML
125 INJECTION, SOLUTION INTRAVENOUS CONTINUOUS
OUTPATIENT
Start: 2023-11-21

## 2023-11-21 RX ORDER — HEPARIN SODIUM 5000 [USP'U]/ML
5000 INJECTION, SOLUTION INTRAVENOUS; SUBCUTANEOUS ONCE
OUTPATIENT
Start: 2023-11-21 | End: 2023-11-21
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
## 2023-12-12 ENCOUNTER — ANESTHESIA EVENT (OUTPATIENT)
Dept: GASTROENTEROLOGY | Facility: HOSPITAL | Age: 48
End: 2023-12-12

## 2023-12-13 ENCOUNTER — HOSPITAL ENCOUNTER (OUTPATIENT)
Dept: GASTROENTEROLOGY | Facility: HOSPITAL | Age: 48
Setting detail: OUTPATIENT SURGERY
Discharge: HOME | End: 2023-12-13
Attending: INTERNAL MEDICINE
Payer: COMMERCIAL

## 2023-12-13 ENCOUNTER — ANESTHESIA (OUTPATIENT)
Dept: GASTROENTEROLOGY | Facility: HOSPITAL | Age: 48
End: 2023-12-13

## 2023-12-13 VITALS
TEMPERATURE: 98.1 F | SYSTOLIC BLOOD PRESSURE: 119 MMHG | HEIGHT: 69 IN | RESPIRATION RATE: 24 BRPM | OXYGEN SATURATION: 93 % | HEART RATE: 114 BPM | BODY MASS INDEX: 33.31 KG/M2 | DIASTOLIC BLOOD PRESSURE: 74 MMHG | WEIGHT: 224.87 LBS

## 2023-12-13 DIAGNOSIS — R10.11 RIGHT UPPER QUADRANT PAIN: ICD-10-CM

## 2023-12-13 DIAGNOSIS — K62.5 RECTAL BLEEDING: ICD-10-CM

## 2023-12-13 DIAGNOSIS — K70.30 ALCOHOLIC CIRRHOSIS OF LIVER WITHOUT ASCITES (HCC): ICD-10-CM

## 2023-12-13 DIAGNOSIS — K59.09 OTHER CONSTIPATION: ICD-10-CM

## 2023-12-13 PROCEDURE — 43239 EGD BIOPSY SINGLE/MULTIPLE: CPT | Performed by: INTERNAL MEDICINE

## 2023-12-13 PROCEDURE — 88305 TISSUE EXAM BY PATHOLOGIST: CPT | Performed by: STUDENT IN AN ORGANIZED HEALTH CARE EDUCATION/TRAINING PROGRAM

## 2023-12-13 PROCEDURE — 45330 DIAGNOSTIC SIGMOIDOSCOPY: CPT | Performed by: INTERNAL MEDICINE

## 2023-12-13 RX ORDER — PROPOFOL 10 MG/ML
INJECTION, EMULSION INTRAVENOUS AS NEEDED
Status: DISCONTINUED | OUTPATIENT
Start: 2023-12-13 | End: 2023-12-13

## 2023-12-13 RX ORDER — LIDOCAINE HYDROCHLORIDE 10 MG/ML
INJECTION, SOLUTION EPIDURAL; INFILTRATION; INTRACAUDAL; PERINEURAL AS NEEDED
Status: DISCONTINUED | OUTPATIENT
Start: 2023-12-13 | End: 2023-12-13

## 2023-12-13 RX ORDER — GLYCOPYRROLATE 0.2 MG/ML
INJECTION INTRAMUSCULAR; INTRAVENOUS AS NEEDED
Status: DISCONTINUED | OUTPATIENT
Start: 2023-12-13 | End: 2023-12-13

## 2023-12-13 RX ORDER — SODIUM CHLORIDE 9 MG/ML
INJECTION, SOLUTION INTRAVENOUS CONTINUOUS PRN
Status: DISCONTINUED | OUTPATIENT
Start: 2023-12-13 | End: 2023-12-13

## 2023-12-13 RX ADMIN — PROPOFOL 50 MG: 10 INJECTION, EMULSION INTRAVENOUS at 09:51

## 2023-12-13 RX ADMIN — GLYCOPYRROLATE 0.2 MG: 0.2 INJECTION INTRAMUSCULAR; INTRAVENOUS at 09:44

## 2023-12-13 RX ADMIN — LIDOCAINE HYDROCHLORIDE 100 MG: 10 INJECTION, SOLUTION EPIDURAL; INFILTRATION; INTRACAUDAL; PERINEURAL at 09:44

## 2023-12-13 RX ADMIN — PROPOFOL 50 MG: 10 INJECTION, EMULSION INTRAVENOUS at 09:49

## 2023-12-13 RX ADMIN — PROPOFOL 50 MG: 10 INJECTION, EMULSION INTRAVENOUS at 09:47

## 2023-12-13 RX ADMIN — PROPOFOL 50 MG: 10 INJECTION, EMULSION INTRAVENOUS at 09:53

## 2023-12-13 RX ADMIN — PROPOFOL 50 MG: 10 INJECTION, EMULSION INTRAVENOUS at 09:55

## 2023-12-13 RX ADMIN — SODIUM CHLORIDE: 0.9 INJECTION, SOLUTION INTRAVENOUS at 09:42

## 2023-12-13 RX ADMIN — PROPOFOL 200 MG: 10 INJECTION, EMULSION INTRAVENOUS at 09:44

## 2023-12-13 RX ADMIN — PROPOFOL 50 MG: 10 INJECTION, EMULSION INTRAVENOUS at 09:45

## 2023-12-13 NOTE — DISCHARGE INSTRUCTIONS
Upper Endoscopy   WHAT YOU NEED TO KNOW:   An upper endoscopy is also called an upper gastrointestinal (GI) endoscopy, or an esophagogastroduodenoscopy (EGD). It is a procedure to examine the inside of your esophagus, stomach, and duodenum (first part of the small intestine) with a scope. You may feel bloated, gassy, or have some abdominal discomfort after your procedure. Your throat may be sore for 24 to 36 hours. You may burp or pass gas from air that is still inside your body.         DISCHARGE INSTRUCTIONS:   Seek care immediately if:   You have sudden, severe abdominal pain.     You have problems swallowing.     You have a large amount of black, sticky bowel movements or blood in your bowel movements.     You have sudden trouble breathing.     You feel weak, lightheaded, or faint or your heart beats faster than normal for you.     Contact your healthcare provider if:   You have a fever and chills.      You have nausea or are vomiting.      Your abdomen is bloated or feels full and hard.     You have abdominal pain.   You have black, sticky bowel movements or blood in your bowel movements.  You have not had a bowel movement for 3 days after your procedure.  You have rash or hives.  You have questions or concerns about your procedure.    Activity:   Do not lift, strain, or run for 24 hours after your procedure.     Rest after your procedure. You have been given medicine to relax you. Do not drive or make important decisions until the day after your procedure. Return to your normal activity as directed.     Relieve gas and discomfort from bloating by lying on your right side with a heating pad on your abdomen. You may need to take short walks to help the gas move out. Eat small meals until bloating is relieved.  Follow up with your healthcare provider as directed: Write down your questions so you remember to ask them during your visits.     If you take a “blood thinner”, please review the specific instructions  from your endoscopist about when you should resume it. These can be found in the “Recommendation” and “Your Medication list” sections of this After Visit Summary.  Colonoscopy   WHAT YOU NEED TO KNOW:   A colonoscopy is a procedure to examine the inside of your colon (intestine) with a scope. Polyps or tissue growths may have been removed during your colonoscopy. It is normal to feel bloated and to have some abdominal discomfort. You should be passing gas. If you have hemorrhoids or you had polyps removed, you may have a small amount of bleeding.        DISCHARGE INSTRUCTIONS:   Seek care immediately if:   You have sudden, severe abdominal pain.     You have problems swallowing.     You have a large amount of black, sticky bowel movements or blood in your bowel movements.     You have sudden trouble breathing.     You feel weak, lightheaded, or faint or your heart beats faster than normal for you.     Contact your healthcare provider if:   You have a fever and chills.      You have nausea or are vomiting.      Your abdomen is bloated or feels full and hard.     You have abdominal pain.   You have black, sticky bowel movements or blood in your bowel movements.  You have not had a bowel movement for 3 days after your procedure.  You have rash or hives.  You have questions or concerns about your procedure.    Activity:   Do not lift, strain, or run for 24 hours after your procedure.     Rest after your procedure. You have been given medicine to relax you. Do not drive or make important decisions until the day after your procedure. Return to your normal activity as directed.     Relieve gas and discomfort from bloating by lying on your right side with a heating pad on your abdomen. You may need to take short walks to help the gas move out. Eat small meals until bloating is relieved.  Follow up with your healthcare provider as directed: Write down your questions so you remember to ask them during your visits.     If you  take a “blood thinner”, please review the specific instructions from your endoscopist about when you should resume it. These can be found in the “Recommendation” and “Your Medication list” sections of this After Visit Summary.
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
## 2023-12-13 NOTE — ANESTHESIA POSTPROCEDURE EVALUATION
Post-Op Assessment Note    CV Status:  Stable  Pain Score: 0    Pain management: adequate       Mental Status:  Awake and sleepy   Hydration Status:  Euvolemic   PONV Controlled:  Controlled   Airway Patency:  Patent  Two or more mitigation strategies used for obstructive sleep apnea   Post Op Vitals Reviewed: Yes      Staff: CRNA               BP   120/73   Temp   97.2   Pulse  111   Resp   16   SpO2   94

## 2023-12-13 NOTE — INTERVAL H&P NOTE
H&P reviewed. After examining the patient I find no changes in the patients condition since the H&P had been written.    Vitals:    12/13/23 0852   BP: 141/87   Pulse: (!) 112   Resp: 16   Temp: 98.3 °F (36.8 °C)   SpO2: 93%

## 2023-12-15 ENCOUNTER — APPOINTMENT (OUTPATIENT)
Dept: LAB | Facility: CLINIC | Age: 48
End: 2023-12-15
Payer: COMMERCIAL

## 2023-12-15 DIAGNOSIS — K80.10 CHRONIC CALCULOUS CHOLECYSTITIS: ICD-10-CM

## 2023-12-15 DIAGNOSIS — D69.6 THROMBOCYTOPENIA (HCC): ICD-10-CM

## 2023-12-15 LAB
ALBUMIN SERPL BCP-MCNC: 3.8 G/DL (ref 3.5–5)
ALP SERPL-CCNC: 76 U/L (ref 34–104)
ALT SERPL W P-5'-P-CCNC: 56 U/L (ref 7–52)
ANION GAP SERPL CALCULATED.3IONS-SCNC: 8 MMOL/L
AST SERPL W P-5'-P-CCNC: 107 U/L (ref 13–39)
BILIRUB SERPL-MCNC: 1.65 MG/DL (ref 0.2–1)
BUN SERPL-MCNC: 4 MG/DL (ref 5–25)
CALCIUM SERPL-MCNC: 9.1 MG/DL (ref 8.4–10.2)
CHLORIDE SERPL-SCNC: 103 MMOL/L (ref 96–108)
CO2 SERPL-SCNC: 26 MMOL/L (ref 21–32)
CREAT SERPL-MCNC: 0.63 MG/DL (ref 0.6–1.3)
ERYTHROCYTE [DISTWIDTH] IN BLOOD BY AUTOMATED COUNT: 14.6 % (ref 11.6–15.1)
GFR SERPL CREATININE-BSD FRML MDRD: 115 ML/MIN/1.73SQ M
GLUCOSE P FAST SERPL-MCNC: 336 MG/DL (ref 65–99)
HCT VFR BLD AUTO: 40.6 % (ref 36.5–49.3)
HGB BLD-MCNC: 12.8 G/DL (ref 12–17)
MCH RBC QN AUTO: 28.1 PG (ref 26.8–34.3)
MCHC RBC AUTO-ENTMCNC: 31.5 G/DL (ref 31.4–37.4)
MCV RBC AUTO: 89 FL (ref 82–98)
PLATELET # BLD AUTO: 122 THOUSANDS/UL (ref 149–390)
PMV BLD AUTO: 10.6 FL (ref 8.9–12.7)
POTASSIUM SERPL-SCNC: 3.8 MMOL/L (ref 3.5–5.3)
PROT SERPL-MCNC: 7.3 G/DL (ref 6.4–8.4)
RBC # BLD AUTO: 4.56 MILLION/UL (ref 3.88–5.62)
SODIUM SERPL-SCNC: 137 MMOL/L (ref 135–147)
WBC # BLD AUTO: 5.17 THOUSAND/UL (ref 4.31–10.16)

## 2023-12-15 PROCEDURE — 85027 COMPLETE CBC AUTOMATED: CPT

## 2023-12-15 PROCEDURE — 36415 COLL VENOUS BLD VENIPUNCTURE: CPT

## 2023-12-15 PROCEDURE — 80053 COMPREHEN METABOLIC PANEL: CPT

## 2023-12-16 PROCEDURE — 88305 TISSUE EXAM BY PATHOLOGIST: CPT | Performed by: STUDENT IN AN ORGANIZED HEALTH CARE EDUCATION/TRAINING PROGRAM

## 2023-12-21 ENCOUNTER — OFFICE VISIT (OUTPATIENT)
Dept: FAMILY MEDICINE CLINIC | Facility: CLINIC | Age: 48
End: 2023-12-21
Payer: COMMERCIAL

## 2023-12-21 VITALS
WEIGHT: 228.6 LBS | DIASTOLIC BLOOD PRESSURE: 88 MMHG | HEART RATE: 105 BPM | BODY MASS INDEX: 33.76 KG/M2 | SYSTOLIC BLOOD PRESSURE: 138 MMHG | OXYGEN SATURATION: 97 % | TEMPERATURE: 98.8 F

## 2023-12-21 DIAGNOSIS — R04.0 SEVERE EPISTAXIS: Primary | ICD-10-CM

## 2023-12-21 DIAGNOSIS — K70.30 ALCOHOLIC CIRRHOSIS OF LIVER WITHOUT ASCITES (HCC): ICD-10-CM

## 2023-12-21 DIAGNOSIS — R04.0 BLEEDING FROM THE NOSE: ICD-10-CM

## 2023-12-21 DIAGNOSIS — F10.10 ALCOHOL ABUSE, DAILY USE: ICD-10-CM

## 2023-12-21 DIAGNOSIS — E11.9 TYPE 2 DIABETES MELLITUS WITHOUT COMPLICATION, WITHOUT LONG-TERM CURRENT USE OF INSULIN (HCC): ICD-10-CM

## 2023-12-21 DIAGNOSIS — B19.20 HEPATITIS C VIRUS INFECTION WITHOUT HEPATIC COMA, UNSPECIFIED CHRONICITY: ICD-10-CM

## 2023-12-21 LAB — SL AMB POCT HEMOGLOBIN AIC: 8 (ref ?–6.5)

## 2023-12-21 PROCEDURE — 99215 OFFICE O/P EST HI 40 MIN: CPT

## 2023-12-21 PROCEDURE — 83036 HEMOGLOBIN GLYCOSYLATED A1C: CPT

## 2023-12-21 RX ORDER — LANCETS 33 GAUGE
EACH MISCELLANEOUS
Qty: 200 EACH | Refills: 3 | Status: SHIPPED | OUTPATIENT
Start: 2023-12-21

## 2023-12-21 RX ORDER — BLOOD-GLUCOSE METER
KIT MISCELLANEOUS
Qty: 1 KIT | Refills: 0 | Status: SHIPPED | OUTPATIENT
Start: 2023-12-21

## 2023-12-21 RX ORDER — BLOOD SUGAR DIAGNOSTIC
STRIP MISCELLANEOUS
Qty: 200 EACH | Refills: 3 | Status: SHIPPED | OUTPATIENT
Start: 2023-12-21

## 2023-12-21 NOTE — PROGRESS NOTES
Name: Robert Combs      : 1975      MRN: [id number]  Encounter Provider: Stephany Gibbs PA-C  Encounter Date: 2023   Encounter department: [facility]    Assessment & Plan     1. Severe epistaxis  -     Transfer to other facility    2. Bleeding from the nose  -     Protime-INR; Future  -     APTT; Future    3. Type 2 diabetes mellitus without complication, without long-term current use of insulin (HCC)  -     POCT hemoglobin A1c  -     Comprehensive metabolic panel; Future; Expected date: 2023  -     CBC and differential; Future; Expected date: 2024  -     Blood Glucose Monitoring Suppl (OneTouch Verio Reflect) w/Device KIT; Check blood sugars twice daily. Please substitute with appropriate alternative as covered by patient's insurance. Dx: E11.65  -     glucose blood (OneTouch Verio) test strip; Check blood sugars twice daily. Please substitute with appropriate alternative as covered by patient's insurance. Dx: E11.65  -     OneTouch Delica Lancets 33G MISC; Check blood sugars twice daily. Please substitute with appropriate alternative as covered by patient's insurance. Dx: E11.65  -     metFORMIN (GLUCOPHAGE) 500 mg tablet; Take 1 tablet (500 mg total) by mouth 2 (two) times a day with meals    4. Alcohol abuse, daily use    5. Hepatitis C virus infection without hepatic coma, unspecified chronicity    6. Alcoholic cirrhosis of liver without ascites (HCC)    Patient had episode of severe epistaxis in exam room. Pressure applied, Afrin used, and rhino rocket used in exam room. Due to severity of bleed and large volume of blood loss, did recommend patient go to ER and that I call EMS right away. Patient refusing adamantly. Patient was counseled that if the bleed continued or restarted he could bleed out which could possibility be fatal. Patient verbalizes understanding and is still adament he will not be going to the ER right now and does not wish to have EMS called.  "Did discuss trying to get patient in with ENT today to get the bleed cauterized - patient refuses noting he has no form of transportation. Ultimately, the bleed was stabilized and stopped by the time the patient left but it was still recommended that he go to the ER as soon as possible since he was refusing EMS. Patient noting he \"may go later\" but will not go now. Otherwise ordered above lab work to assess patient's bleeding/clotting time as per his history severe nose bleeds to this extent occur daily for him. Of note, patient has hx of chronic hepatitis C, alcohol abuse, and alcoholic cirrhosis which is likely contributing to the frequent nose bleeds and severity of bleeding. Did discuss this with patient and emphasized importance of going to ER for further evaluation given hx - patient still refusing.     DM: patient's A1C today 8.2 - meets criteria for new diagnosis of DM 2 which explains why his recent fasting glucose was elevated at 336. Educated patient on new diagnosis of diabetes and discussed in depth dietary recommendations for diabetes and exercise recommendations. Discussed starting Metformin for better glucose control including risks/benefits and potential side effects of the medication. Patient would like to start - therefore prescribed metformin 500 mg BID. Also ordered him blood glucose monitoring supplies and advised him to start taking his sugar at least twice a day and keeping  a log of it. Recommended a two week follow up for re-evaluation to see how patient is doing on medication. To call with any questions or concerns.     I have spent a total time of 45 minutes on 12/21/23 in caring for this patient including Diagnostic results, Prognosis, Risks and benefits of tx options, Instructions for management, Patient and family education, Risk factor reductions, Impressions, Counseling / Coordination of care, Documenting in the medical record, Reviewing / ordering tests, medicine, procedures  , and " "Obtaining or reviewing history  .        Subjective      CC: hyperglycemia     Patient was scheduled for laparoscopic cholecystectomy on 12/21/23 (today) - however surgery was cancelled per surgeon has patient's pre-op lab work showed a fasting glucose of 336. Patient has no history of diabetes or hyperglycemia. Patient is extremely upset. Patient's last hgbA1C was completed on 10/9/23 which was 6.2 putting him in the prediabetic range.     While in the exam room when speaking to patient about his elevated A1C and new diagnosis of DM blood started to gush from his nose and subsequently his mouth as well. Per patient, this happens \"every day for the last year\" and even happened this morning before coming to the office. Patient held pressure to the nose for about 5 minutes without it stopping; at that point Afrin was obtained and soaked onto a Q-tip which was then inserted into the bleeding right nares. Bleeding did not subside with the Afrin. Therefore, a Rhinorocket was used in the right nares which temporarily stopped the bleeding. About two minutes later the bleeding restarted. Patient again held pressure. At this time, patient was advised that he needed to go to the ER and that I would recommend calling EMS for transportation given severity of bleeding - patient was adamantly refusing EMS being called noting his car was here and he had to get it home to his girlfriend or else she would be stranded. Instead, he requested I order lab work outpatient and he will go to the ER \"a little later\" if the bleeding continues. By the time patient left the office, bleeding was controlled after about ten minutes of holding pressure and there was no active bleeding. Patient asymptomatic after bleeding stabilized, denies headache, dizziness, lightheadedness, cp, sob, visual disturbance.         Review of Systems   Constitutional:  Negative for appetite change, chills, diaphoresis, fatigue and fever.   Eyes:  Negative for visual " "disturbance.   Respiratory:  Negative for cough, chest tightness, shortness of breath and wheezing.    Cardiovascular:  Negative for chest pain and palpitations.   Gastrointestinal:  Negative for blood in stool.   Genitourinary:  Negative for hematuria.   Neurological:  Negative for dizziness, light-headedness and headaches.       Current Outpatient Medications on File Prior to Visit   Medication Sig    clonazePAM (KlonoPIN) 0.5 mg tablet Take 0.5 mg by mouth daily as needed    mirtazapine (REMERON) 30 mg tablet Take 30 mg by mouth daily at bedtime    pantoprazole (PROTONIX) 40 mg tablet TAKE 1 TABLET BY MOUTH EVERY DAY    traZODone (DESYREL) 150 mg tablet Take 150 mg by mouth daily at bedtime       Objective     /88   Pulse 105   Temp 98.8 °F (37.1 °C)   Wt 104 kg (228 lb 9.6 oz)   SpO2 97%   BMI 33.76 kg/m²     Physical Exam  Constitutional:       Appearance: Normal appearance. He is ill-appearing. He is not diaphoretic.      Comments: Patient is shaking profusely in exam room - notes it is because he \"has not eaten or drank anything\" because he wants blood work ordered after the visit.    HENT:      Head: Normocephalic and atraumatic.      Right Ear: External ear normal.      Left Ear: External ear normal.      Nose:      Right Nostril: Epistaxis (profuse bleeding from right nares) present.      Left Nostril: No epistaxis.      Right Turbinates: Swollen.      Left Turbinates: Not swollen.      Mouth/Throat:      Mouth: Mucous membranes are moist.      Comments: Patient spitting blood/coughing up blood clots while in exam room due to nose bleed. Notes this \"happens everyday\".   Eyes:      General:         Right eye: No discharge.         Left eye: No discharge.      Conjunctiva/sclera: Conjunctivae normal.   Cardiovascular:      Rate and Rhythm: Normal rate and regular rhythm.      Pulses: Normal pulses.      Heart sounds: Normal heart sounds. No murmur heard.  Pulmonary:      Effort: Pulmonary effort " is normal. No respiratory distress.      Breath sounds: Normal breath sounds. No wheezing, rhonchi or rales.   Musculoskeletal:         General: Normal range of motion.      Cervical back: Normal range of motion.   Skin:     General: Skin is warm.   Neurological:      General: No focal deficit present.      Mental Status: He is alert.      Gait: Gait normal.       Stephany Gibbs PA-C

## 2023-12-27 ENCOUNTER — APPOINTMENT (OUTPATIENT)
Dept: LAB | Facility: CLINIC | Age: 48
End: 2023-12-27
Payer: COMMERCIAL

## 2023-12-27 DIAGNOSIS — E11.9 TYPE 2 DIABETES MELLITUS WITHOUT COMPLICATION, WITHOUT LONG-TERM CURRENT USE OF INSULIN (HCC): ICD-10-CM

## 2023-12-27 DIAGNOSIS — R04.0 BLEEDING FROM THE NOSE: ICD-10-CM

## 2023-12-27 LAB
ALBUMIN SERPL BCP-MCNC: 3.6 G/DL (ref 3.5–5)
ALP SERPL-CCNC: 70 U/L (ref 34–104)
ALT SERPL W P-5'-P-CCNC: 62 U/L (ref 7–52)
ANION GAP SERPL CALCULATED.3IONS-SCNC: 8 MMOL/L
APTT PPP: 32 SECONDS (ref 23–37)
AST SERPL W P-5'-P-CCNC: 120 U/L (ref 13–39)
BASOPHILS # BLD AUTO: 0.03 THOUSANDS/ÂΜL (ref 0–0.1)
BASOPHILS NFR BLD AUTO: 1 % (ref 0–1)
BILIRUB SERPL-MCNC: 2.58 MG/DL (ref 0.2–1)
BUN SERPL-MCNC: 7 MG/DL (ref 5–25)
CALCIUM SERPL-MCNC: 8.8 MG/DL (ref 8.4–10.2)
CHLORIDE SERPL-SCNC: 104 MMOL/L (ref 96–108)
CO2 SERPL-SCNC: 25 MMOL/L (ref 21–32)
CREAT SERPL-MCNC: 0.54 MG/DL (ref 0.6–1.3)
EOSINOPHIL # BLD AUTO: 0.09 THOUSAND/ÂΜL (ref 0–0.61)
EOSINOPHIL NFR BLD AUTO: 2 % (ref 0–6)
ERYTHROCYTE [DISTWIDTH] IN BLOOD BY AUTOMATED COUNT: 15.2 % (ref 11.6–15.1)
GFR SERPL CREATININE-BSD FRML MDRD: 123 ML/MIN/1.73SQ M
GLUCOSE P FAST SERPL-MCNC: 162 MG/DL (ref 65–99)
HCT VFR BLD AUTO: 35.5 % (ref 36.5–49.3)
HGB BLD-MCNC: 11.6 G/DL (ref 12–17)
IMM GRANULOCYTES # BLD AUTO: 0.01 THOUSAND/UL (ref 0–0.2)
IMM GRANULOCYTES NFR BLD AUTO: 0 % (ref 0–2)
INR PPP: 1.44 (ref 0.84–1.19)
LYMPHOCYTES # BLD AUTO: 0.77 THOUSANDS/ÂΜL (ref 0.6–4.47)
LYMPHOCYTES NFR BLD AUTO: 19 % (ref 14–44)
MCH RBC QN AUTO: 27.7 PG (ref 26.8–34.3)
MCHC RBC AUTO-ENTMCNC: 32.7 G/DL (ref 31.4–37.4)
MCV RBC AUTO: 85 FL (ref 82–98)
MONOCYTES # BLD AUTO: 0.58 THOUSAND/ÂΜL (ref 0.17–1.22)
MONOCYTES NFR BLD AUTO: 14 % (ref 4–12)
NEUTROPHILS # BLD AUTO: 2.6 THOUSANDS/ÂΜL (ref 1.85–7.62)
NEUTS SEG NFR BLD AUTO: 64 % (ref 43–75)
NRBC BLD AUTO-RTO: 0 /100 WBCS
PLATELET # BLD AUTO: 113 THOUSANDS/UL (ref 149–390)
PMV BLD AUTO: 10.5 FL (ref 8.9–12.7)
POTASSIUM SERPL-SCNC: 3.4 MMOL/L (ref 3.5–5.3)
PROT SERPL-MCNC: 7 G/DL (ref 6.4–8.4)
PROTHROMBIN TIME: 17.3 SECONDS (ref 11.6–14.5)
RBC # BLD AUTO: 4.19 MILLION/UL (ref 3.88–5.62)
SODIUM SERPL-SCNC: 137 MMOL/L (ref 135–147)
WBC # BLD AUTO: 4.08 THOUSAND/UL (ref 4.31–10.16)

## 2023-12-27 PROCEDURE — 85025 COMPLETE CBC W/AUTO DIFF WBC: CPT

## 2023-12-27 PROCEDURE — 85610 PROTHROMBIN TIME: CPT

## 2023-12-27 PROCEDURE — 36415 COLL VENOUS BLD VENIPUNCTURE: CPT

## 2023-12-27 PROCEDURE — 85730 THROMBOPLASTIN TIME PARTIAL: CPT

## 2023-12-27 PROCEDURE — 80053 COMPREHEN METABOLIC PANEL: CPT

## 2023-12-28 ENCOUNTER — TELEPHONE (OUTPATIENT)
Dept: HEMATOLOGY ONCOLOGY | Facility: CLINIC | Age: 48
End: 2023-12-28

## 2023-12-28 DIAGNOSIS — K70.30 ALCOHOLIC CIRRHOSIS OF LIVER WITHOUT ASCITES (HCC): Primary | ICD-10-CM

## 2023-12-28 NOTE — TELEPHONE ENCOUNTER
I called and spoke to patient in regards to rescheduling appointment. Patient agreeable to new date and time.

## 2023-12-29 DIAGNOSIS — E87.6 HYPOKALEMIA: Primary | ICD-10-CM

## 2024-01-07 DIAGNOSIS — E11.9 TYPE 2 DIABETES MELLITUS WITHOUT COMPLICATION, WITHOUT LONG-TERM CURRENT USE OF INSULIN (HCC): ICD-10-CM

## 2024-01-11 ENCOUNTER — APPOINTMENT (OUTPATIENT)
Dept: LAB | Facility: CLINIC | Age: 49
End: 2024-01-11
Payer: COMMERCIAL

## 2024-01-11 DIAGNOSIS — K70.30 ALCOHOLIC CIRRHOSIS OF LIVER WITHOUT ASCITES (HCC): ICD-10-CM

## 2024-01-11 LAB
ALBUMIN SERPL BCP-MCNC: 3.5 G/DL (ref 3.5–5)
ALP SERPL-CCNC: 73 U/L (ref 34–104)
ALT SERPL W P-5'-P-CCNC: 49 U/L (ref 7–52)
ANION GAP SERPL CALCULATED.3IONS-SCNC: 8 MMOL/L
AST SERPL W P-5'-P-CCNC: 87 U/L (ref 13–39)
BILIRUB SERPL-MCNC: 1.09 MG/DL (ref 0.2–1)
BUN SERPL-MCNC: 9 MG/DL (ref 5–25)
CALCIUM SERPL-MCNC: 8.4 MG/DL (ref 8.4–10.2)
CHLORIDE SERPL-SCNC: 106 MMOL/L (ref 96–108)
CO2 SERPL-SCNC: 22 MMOL/L (ref 21–32)
CREAT SERPL-MCNC: 0.54 MG/DL (ref 0.6–1.3)
GFR SERPL CREATININE-BSD FRML MDRD: 123 ML/MIN/1.73SQ M
GLUCOSE P FAST SERPL-MCNC: 160 MG/DL (ref 65–99)
POTASSIUM SERPL-SCNC: 3.5 MMOL/L (ref 3.5–5.3)
PROT SERPL-MCNC: 7 G/DL (ref 6.4–8.4)
SODIUM SERPL-SCNC: 136 MMOL/L (ref 135–147)

## 2024-01-11 PROCEDURE — 80053 COMPREHEN METABOLIC PANEL: CPT

## 2024-01-11 PROCEDURE — 36415 COLL VENOUS BLD VENIPUNCTURE: CPT

## 2024-01-19 ENCOUNTER — TELEPHONE (OUTPATIENT)
Dept: HEMATOLOGY ONCOLOGY | Facility: CLINIC | Age: 49
End: 2024-01-19

## 2024-01-19 NOTE — TELEPHONE ENCOUNTER
I called patient and left a detailed VM explaining that due to a schedule change we had to move his appointment on 01/22/24 from Dr. Álvarez at 12:40 pm to Alyssa @ 8:30 am. I left the hope line number to call back with any questions.

## 2024-01-22 ENCOUNTER — TELEPHONE (OUTPATIENT)
Dept: HEMATOLOGY ONCOLOGY | Facility: CLINIC | Age: 49
End: 2024-01-22

## 2024-01-22 ENCOUNTER — OFFICE VISIT (OUTPATIENT)
Dept: HEMATOLOGY ONCOLOGY | Facility: CLINIC | Age: 49
End: 2024-01-22
Payer: COMMERCIAL

## 2024-01-22 ENCOUNTER — APPOINTMENT (OUTPATIENT)
Dept: LAB | Facility: HOSPITAL | Age: 49
End: 2024-01-22
Payer: COMMERCIAL

## 2024-01-22 VITALS
DIASTOLIC BLOOD PRESSURE: 88 MMHG | HEART RATE: 110 BPM | HEIGHT: 69 IN | SYSTOLIC BLOOD PRESSURE: 138 MMHG | OXYGEN SATURATION: 97 % | RESPIRATION RATE: 16 BRPM | WEIGHT: 224 LBS | BODY MASS INDEX: 33.18 KG/M2 | TEMPERATURE: 98.2 F

## 2024-01-22 DIAGNOSIS — R04.0 EPISTAXIS: ICD-10-CM

## 2024-01-22 DIAGNOSIS — D69.6 THROMBOCYTOPENIA (HCC): ICD-10-CM

## 2024-01-22 DIAGNOSIS — D64.9 ANEMIA, UNSPECIFIED TYPE: Primary | ICD-10-CM

## 2024-01-22 DIAGNOSIS — K70.30 ALCOHOLIC CIRRHOSIS, UNSPECIFIED WHETHER ASCITES PRESENT (HCC): ICD-10-CM

## 2024-01-22 DIAGNOSIS — D64.9 ANEMIA, UNSPECIFIED TYPE: ICD-10-CM

## 2024-01-22 LAB
BASOPHILS # BLD AUTO: 0.02 THOUSANDS/ÂΜL (ref 0–0.1)
BASOPHILS NFR BLD AUTO: 1 % (ref 0–1)
EOSINOPHIL # BLD AUTO: 0.08 THOUSAND/ÂΜL (ref 0–0.61)
EOSINOPHIL NFR BLD AUTO: 3 % (ref 0–6)
ERYTHROCYTE [DISTWIDTH] IN BLOOD BY AUTOMATED COUNT: 15.1 % (ref 11.6–15.1)
FERRITIN SERPL-MCNC: 14 NG/ML (ref 24–336)
FOLATE SERPL-MCNC: 18 NG/ML
HCT VFR BLD AUTO: 36.2 % (ref 36.5–49.3)
HGB BLD-MCNC: 12.1 G/DL (ref 12–17)
IMM GRANULOCYTES # BLD AUTO: 0.01 THOUSAND/UL (ref 0–0.2)
IMM GRANULOCYTES NFR BLD AUTO: 0 % (ref 0–2)
IRON SATN MFR SERPL: 8 % (ref 15–50)
IRON SERPL-MCNC: 34 UG/DL (ref 50–212)
LYMPHOCYTES # BLD AUTO: 0.76 THOUSANDS/ÂΜL (ref 0.6–4.47)
LYMPHOCYTES NFR BLD AUTO: 26 % (ref 14–44)
MCH RBC QN AUTO: 26.6 PG (ref 26.8–34.3)
MCHC RBC AUTO-ENTMCNC: 33.4 G/DL (ref 31.4–37.4)
MCV RBC AUTO: 80 FL (ref 82–98)
MONOCYTES # BLD AUTO: 0.42 THOUSAND/ÂΜL (ref 0.17–1.22)
MONOCYTES NFR BLD AUTO: 15 % (ref 4–12)
NEUTROPHILS # BLD AUTO: 1.61 THOUSANDS/ÂΜL (ref 1.85–7.62)
NEUTS SEG NFR BLD AUTO: 55 % (ref 43–75)
NRBC BLD AUTO-RTO: 0 /100 WBCS
PLATELET # BLD AUTO: 127 THOUSANDS/UL (ref 149–390)
PMV BLD AUTO: 10.6 FL (ref 8.9–12.7)
RBC # BLD AUTO: 4.55 MILLION/UL (ref 3.88–5.62)
TIBC SERPL-MCNC: 439 UG/DL (ref 250–450)
UIBC SERPL-MCNC: 405 UG/DL (ref 155–355)
VIT B12 SERPL-MCNC: 751 PG/ML (ref 180–914)
WBC # BLD AUTO: 2.9 THOUSAND/UL (ref 4.31–10.16)

## 2024-01-22 PROCEDURE — 85025 COMPLETE CBC W/AUTO DIFF WBC: CPT

## 2024-01-22 PROCEDURE — 83540 ASSAY OF IRON: CPT

## 2024-01-22 PROCEDURE — 82728 ASSAY OF FERRITIN: CPT

## 2024-01-22 PROCEDURE — 82746 ASSAY OF FOLIC ACID SERUM: CPT

## 2024-01-22 PROCEDURE — 99214 OFFICE O/P EST MOD 30 MIN: CPT | Performed by: PHYSICIAN ASSISTANT

## 2024-01-22 PROCEDURE — 83550 IRON BINDING TEST: CPT

## 2024-01-22 PROCEDURE — 36415 COLL VENOUS BLD VENIPUNCTURE: CPT

## 2024-01-22 PROCEDURE — 82607 VITAMIN B-12: CPT
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
## 2024-01-22 NOTE — PATIENT INSTRUCTIONS
Labs today or tomorrow --I will give you a call you with your results   Bone marrow biopsy to be completed   Please call our office as soon as you have your surgery date. You will need to have labs drawn within 1 week of your surgery. We will provide you a lab slip for this electronically.

## 2024-01-22 NOTE — TELEPHONE ENCOUNTER
Called patient to review lab work.  Went to voicemail.  Left message to call back.  We would like additional labs to evaluate bleeding time, this will be necessary prior to any surgery. Order submitted they are nonfasting.

## 2024-01-25 ENCOUNTER — TELEPHONE (OUTPATIENT)
Dept: HEMATOLOGY ONCOLOGY | Facility: CLINIC | Age: 49
End: 2024-01-25

## 2024-01-25 DIAGNOSIS — D50.0 IRON DEFICIENCY ANEMIA DUE TO CHRONIC BLOOD LOSS: Primary | ICD-10-CM

## 2024-01-25 RX ORDER — FERROUS SULFATE 324(65)MG
324 TABLET, DELAYED RELEASE (ENTERIC COATED) ORAL
Qty: 90 TABLET | Refills: 3 | Status: SHIPPED | OUTPATIENT
Start: 2024-01-25

## 2024-01-25 NOTE — TELEPHONE ENCOUNTER
Called patient to notify him that his lab results show decreasing white blood cell   Persistent thrombocytopenia.  Labs also reveal low iron stores which is likely due to his recurrent epistasis.  Sent prescription to his pharmacy for ferrous sulfate 324 mg daily, encouraged him to take it with vitamin C.  Recommend patient proceed with bone marrow biopsy as planned.  He is to notify our office in the meantime if he develops any bleeding, infections, or fever/chills.

## 2024-01-25 NOTE — TELEPHONE ENCOUNTER
Patient Call    Who are you speaking with? Patient    If it is not the patient, are they listed on an active communication consent form? Yes   What is the reason for this call? Patient calling for VIRGINIA Boyd    1/22/24  5:09 PM  Note     Called patient to review lab work.  Went to voicemail.  Left message to call back.  We would like additional labs to evaluate bleeding time, this will be necessary prior to any surgery. Order submitted they are nonfasting.         Does this require a call back? N/A   If a call back is required, please list best call back number N/A   If a call back is required, advise that a message will be forwarded to their care team and someone will return their call as soon as possible.   Did you relay this information to the patient? Yes

## 2024-01-25 NOTE — TELEPHONE ENCOUNTER
I called and spoke to the patient. He was confused, stating that he was told he needs to have more blood work done prior to the procedure. He is asking if something is wrong with his current labs that he just did because he does not understand. I offered to give him the phone number for IR but he stated that he has the 2 voicemail's from them and he would call to schedule.

## 2024-01-26 ENCOUNTER — APPOINTMENT (OUTPATIENT)
Dept: LAB | Facility: CLINIC | Age: 49
End: 2024-01-26
Payer: COMMERCIAL

## 2024-01-26 DIAGNOSIS — K70.30 ALCOHOLIC CIRRHOSIS, UNSPECIFIED WHETHER ASCITES PRESENT (HCC): ICD-10-CM

## 2024-01-26 DIAGNOSIS — E11.9 TYPE 2 DIABETES MELLITUS WITHOUT COMPLICATION, WITHOUT LONG-TERM CURRENT USE OF INSULIN (HCC): ICD-10-CM

## 2024-01-26 DIAGNOSIS — R04.0 EPISTAXIS: ICD-10-CM

## 2024-01-26 LAB
APTT PPP: 34 SECONDS (ref 23–37)
INR PPP: 1.54 (ref 0.84–1.19)
PROTHROMBIN TIME: 18.3 SECONDS (ref 11.6–14.5)

## 2024-01-26 PROCEDURE — 36415 COLL VENOUS BLD VENIPUNCTURE: CPT

## 2024-01-26 PROCEDURE — 85730 THROMBOPLASTIN TIME PARTIAL: CPT

## 2024-01-26 PROCEDURE — 85610 PROTHROMBIN TIME: CPT

## 2024-02-07 DIAGNOSIS — E11.9 TYPE 2 DIABETES MELLITUS WITHOUT COMPLICATION, WITHOUT LONG-TERM CURRENT USE OF INSULIN (HCC): ICD-10-CM

## 2024-02-21 ENCOUNTER — HOSPITAL ENCOUNTER (OUTPATIENT)
Dept: CT IMAGING | Facility: HOSPITAL | Age: 49
Discharge: HOME | End: 2024-02-21
Admitting: RADIOLOGY
Payer: COMMERCIAL

## 2024-02-21 VITALS
HEART RATE: 106 BPM | SYSTOLIC BLOOD PRESSURE: 142 MMHG | WEIGHT: 219.58 LBS | OXYGEN SATURATION: 94 % | DIASTOLIC BLOOD PRESSURE: 85 MMHG | BODY MASS INDEX: 32.52 KG/M2 | HEIGHT: 69 IN | TEMPERATURE: 97.8 F

## 2024-02-21 DIAGNOSIS — D69.6 THROMBOCYTOPENIA (HCC): ICD-10-CM

## 2024-02-21 DIAGNOSIS — D64.9 ANEMIA, UNSPECIFIED TYPE: ICD-10-CM

## 2024-02-21 LAB
BASOPHILS # BLD AUTO: 0.04 THOUSANDS/ÂΜL (ref 0–0.1)
BASOPHILS NFR BLD AUTO: 1 % (ref 0–1)
EOSINOPHIL # BLD AUTO: 0.05 THOUSAND/ÂΜL (ref 0–0.61)
EOSINOPHIL NFR BLD AUTO: 1 % (ref 0–6)
ERYTHROCYTE [DISTWIDTH] IN BLOOD BY AUTOMATED COUNT: 17.8 % (ref 11.6–15.1)
GLUCOSE SERPL-MCNC: 165 MG/DL (ref 65–140)
HCT VFR BLD AUTO: 37.1 % (ref 36.5–49.3)
HGB BLD-MCNC: 12 G/DL (ref 12–17)
IMM GRANULOCYTES # BLD AUTO: 0 THOUSAND/UL (ref 0–0.2)
IMM GRANULOCYTES NFR BLD AUTO: 0 % (ref 0–2)
LYMPHOCYTES # BLD AUTO: 1.11 THOUSANDS/ÂΜL (ref 0.6–4.47)
LYMPHOCYTES NFR BLD AUTO: 24 % (ref 14–44)
MCH RBC QN AUTO: 24.2 PG (ref 26.8–34.3)
MCHC RBC AUTO-ENTMCNC: 32.3 G/DL (ref 31.4–37.4)
MCV RBC AUTO: 75 FL (ref 82–98)
MONOCYTES # BLD AUTO: 0.79 THOUSAND/ÂΜL (ref 0.17–1.22)
MONOCYTES NFR BLD AUTO: 17 % (ref 4–12)
NEUTROPHILS # BLD AUTO: 2.55 THOUSANDS/ÂΜL (ref 1.85–7.62)
NEUTS SEG NFR BLD AUTO: 57 % (ref 43–75)
NRBC BLD AUTO-RTO: 0 /100 WBCS
PLATELET # BLD AUTO: 110 THOUSANDS/UL (ref 149–390)
PMV BLD AUTO: 9.8 FL (ref 8.9–12.7)
RBC # BLD AUTO: 4.95 MILLION/UL (ref 3.88–5.62)
WBC # BLD AUTO: 4.54 THOUSAND/UL (ref 4.31–10.16)

## 2024-02-21 PROCEDURE — 85025 COMPLETE CBC W/AUTO DIFF WBC: CPT | Performed by: RADIOLOGY

## 2024-02-21 PROCEDURE — 82948 REAGENT STRIP/BLOOD GLUCOSE: CPT

## 2024-02-21 RX ORDER — SODIUM CHLORIDE 9 MG/ML
30 INJECTION, SOLUTION INTRAVENOUS CONTINUOUS
Status: DISCONTINUED | OUTPATIENT
Start: 2024-02-21 | End: 2024-02-25 | Stop reason: HOSPADM

## 2024-02-29 DIAGNOSIS — E11.9 TYPE 2 DIABETES MELLITUS WITHOUT COMPLICATION, WITHOUT LONG-TERM CURRENT USE OF INSULIN (HCC): ICD-10-CM

## 2024-03-04 ENCOUNTER — TELEPHONE (OUTPATIENT)
Dept: HEMATOLOGY ONCOLOGY | Facility: CLINIC | Age: 49
End: 2024-03-04

## 2024-03-05 ENCOUNTER — TELEPHONE (OUTPATIENT)
Dept: HEMATOLOGY ONCOLOGY | Facility: CLINIC | Age: 49
End: 2024-03-05

## 2024-03-05 NOTE — TELEPHONE ENCOUNTER
Swp to remind of labs to be done prior to appt, patient explained they would not be able to do labs or make appt at this time. They are in possession of call back number and will call back at their convenience
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
## 2024-03-22 ENCOUNTER — TELEPHONE (OUTPATIENT)
Dept: HEMATOLOGY ONCOLOGY | Facility: CLINIC | Age: 49
End: 2024-03-22

## 2024-03-22 NOTE — TELEPHONE ENCOUNTER
Patient Call    Who are you speaking with? Patient    If it is not the patient, are they listed on an active communication consent form? Yes   What is the reason for this call? Patient asking if labs need to be ordered to have a bone marrow biopsy done.   Does this require a call back? Yes   If a call back is required, please list best call back number [phone]    If a call back is required, advise that a message will be forwarded to their care team and someone will return their call as soon as possible.   Did you relay this information to the patient? Yes

## 2024-03-29 ENCOUNTER — TELEPHONE (OUTPATIENT)
Dept: HEMATOLOGY ONCOLOGY | Facility: CLINIC | Age: 49
End: 2024-03-29

## 2024-06-26 ENCOUNTER — OFFICE VISIT (OUTPATIENT)
Dept: URGENT CARE | Facility: CLINIC | Age: 49
End: 2024-06-26
Payer: COMMERCIAL

## 2024-06-26 VITALS
SYSTOLIC BLOOD PRESSURE: 120 MMHG | RESPIRATION RATE: 18 BRPM | OXYGEN SATURATION: 96 % | HEART RATE: 96 BPM | TEMPERATURE: 98.3 F | DIASTOLIC BLOOD PRESSURE: 76 MMHG

## 2024-06-26 DIAGNOSIS — H10.13 ALLERGIC CONJUNCTIVITIS OF BOTH EYES: Primary | ICD-10-CM

## 2024-06-26 PROCEDURE — 65205 REMOVE FOREIGN BODY FROM EYE: CPT

## 2024-06-26 PROCEDURE — 99213 OFFICE O/P EST LOW 20 MIN: CPT

## 2024-06-26 PROCEDURE — S9088 SERVICES PROVIDED IN URGENT: HCPCS

## 2024-06-26 RX ORDER — BUSPIRONE HYDROCHLORIDE 7.5 MG/1
7.5 TABLET ORAL 2 TIMES DAILY
COMMUNITY
Start: 2024-04-15

## 2024-06-26 RX ORDER — KETOTIFEN FUMARATE 0.35 MG/ML
1 SOLUTION/ DROPS OPHTHALMIC 2 TIMES DAILY
Qty: 10 ML | Refills: 0 | Status: SHIPPED | OUTPATIENT
Start: 2024-06-26
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
## 2024-06-26 NOTE — PATIENT INSTRUCTIONS
Apply drops to affected eye(s) as directed the next 7 days. Apply warm compresses after administering drop for additional relief of symptoms. Wash hands frequently and avoid touching eyes. Change bed linen after 24 hours of using drops. May use oral (zyrtec, benadryl, claritin) or nasal (flonase) decongestants as needed for congestion. Follow-up with [facility] in 3-5 days if no improvement of symptoms. Report to ER if symptoms worsen.

## 2024-07-01 ENCOUNTER — HOSPITAL ENCOUNTER (EMERGENCY)
Facility: HOSPITAL | Age: 49
Discharge: HOME | End: 2024-07-01
Attending: EMERGENCY MEDICINE
Payer: COMMERCIAL

## 2024-07-01 ENCOUNTER — APPOINTMENT (EMERGENCY)
Dept: CT IMAGING | Facility: HOSPITAL | Age: 49
End: 2024-07-01
Payer: COMMERCIAL

## 2024-07-01 ENCOUNTER — APPOINTMENT (EMERGENCY)
Dept: RADIOLOGY | Facility: HOSPITAL | Age: 49
End: 2024-07-01
Payer: COMMERCIAL

## 2024-07-01 VITALS
DIASTOLIC BLOOD PRESSURE: 80 MMHG | HEART RATE: 97 BPM | OXYGEN SATURATION: 97 % | TEMPERATURE: 98.7 F | SYSTOLIC BLOOD PRESSURE: 120 MMHG | RESPIRATION RATE: 14 BRPM

## 2024-07-01 DIAGNOSIS — R55 SYNCOPE: Primary | ICD-10-CM

## 2024-07-01 DIAGNOSIS — F10.20 ALCOHOLISM (HCC): ICD-10-CM

## 2024-07-01 LAB
ALBUMIN SERPL BCG-MCNC: 3.5 G/DL (ref 3.5–5)
ALP SERPL-CCNC: 80 U/L (ref 34–104)
ALT SERPL W P-5'-P-CCNC: 25 U/L (ref 7–52)
AMMONIA PLAS-SCNC: 71 UMOL/L (ref 18–72)
ANION GAP SERPL CALCULATED.3IONS-SCNC: 7 MMOL/L (ref 4–13)
APAP SERPL-MCNC: <2 UG/ML (ref 10–20)
AST SERPL W P-5'-P-CCNC: 101 U/L (ref 13–39)
BASOPHILS # BLD AUTO: 0.03 THOUSANDS/ÂΜL (ref 0–0.1)
BASOPHILS NFR BLD AUTO: 1 % (ref 0–1)
BILIRUB SERPL-MCNC: 3.6 MG/DL (ref 0.2–1)
BUN SERPL-MCNC: 6 MG/DL (ref 5–25)
CALCIUM SERPL-MCNC: 7.8 MG/DL (ref 8.4–10.2)
CARDIAC TROPONIN I PNL SERPL HS: 7 NG/L
CHLORIDE SERPL-SCNC: 108 MMOL/L (ref 96–108)
CO2 SERPL-SCNC: 25 MMOL/L (ref 21–32)
CREAT SERPL-MCNC: 0.52 MG/DL (ref 0.6–1.3)
EOSINOPHIL # BLD AUTO: 0.02 THOUSAND/ÂΜL (ref 0–0.61)
EOSINOPHIL NFR BLD AUTO: 1 % (ref 0–6)
ERYTHROCYTE [DISTWIDTH] IN BLOOD BY AUTOMATED COUNT: 23.2 % (ref 11.6–15.1)
ETHANOL SERPL-MCNC: 412 MG/DL
GFR SERPL CREATININE-BSD FRML MDRD: 125 ML/MIN/1.73SQ M
GLUCOSE SERPL-MCNC: 187 MG/DL (ref 65–140)
GLUCOSE SERPL-MCNC: 207 MG/DL (ref 65–140)
HCT VFR BLD AUTO: 35.5 % (ref 36.5–49.3)
HGB BLD-MCNC: 11.8 G/DL (ref 12–17)
IMM GRANULOCYTES # BLD AUTO: 0.01 THOUSAND/UL (ref 0–0.2)
IMM GRANULOCYTES NFR BLD AUTO: 0 % (ref 0–2)
INR PPP: 1.63 (ref 0.84–1.19)
LYMPHOCYTES # BLD AUTO: 1.02 THOUSANDS/ÂΜL (ref 0.6–4.47)
LYMPHOCYTES NFR BLD AUTO: 25 % (ref 14–44)
MCH RBC QN AUTO: 27.3 PG (ref 26.8–34.3)
MCHC RBC AUTO-ENTMCNC: 33.2 G/DL (ref 31.4–37.4)
MCV RBC AUTO: 82 FL (ref 82–98)
MONOCYTES # BLD AUTO: 0.32 THOUSAND/ÂΜL (ref 0.17–1.22)
MONOCYTES NFR BLD AUTO: 8 % (ref 4–12)
NEUTROPHILS # BLD AUTO: 2.64 THOUSANDS/ÂΜL (ref 1.85–7.62)
NEUTS SEG NFR BLD AUTO: 65 % (ref 43–75)
NRBC BLD AUTO-RTO: 0 /100 WBCS
PLATELET # BLD AUTO: 57 THOUSANDS/UL (ref 149–390)
POTASSIUM SERPL-SCNC: 3.5 MMOL/L (ref 3.5–5.3)
PROT SERPL-MCNC: 7 G/DL (ref 6.4–8.4)
PROTHROMBIN TIME: 20.1 SECONDS (ref 11.6–14.5)
RBC # BLD AUTO: 4.32 MILLION/UL (ref 3.88–5.62)
SALICYLATES SERPL-MCNC: <5 MG/DL (ref 3–20)
SODIUM SERPL-SCNC: 140 MMOL/L (ref 135–147)
WBC # BLD AUTO: 4.04 THOUSAND/UL (ref 4.31–10.16)

## 2024-07-01 PROCEDURE — 99285 EMERGENCY DEPT VISIT HI MDM: CPT

## 2024-07-01 PROCEDURE — 36415 COLL VENOUS BLD VENIPUNCTURE: CPT | Performed by: EMERGENCY MEDICINE

## 2024-07-01 PROCEDURE — 71260 CT THORAX DX C+: CPT

## 2024-07-01 PROCEDURE — 82140 ASSAY OF AMMONIA: CPT | Performed by: EMERGENCY MEDICINE

## 2024-07-01 PROCEDURE — 84484 ASSAY OF TROPONIN QUANT: CPT | Performed by: EMERGENCY MEDICINE

## 2024-07-01 PROCEDURE — 71045 X-RAY EXAM CHEST 1 VIEW: CPT

## 2024-07-01 PROCEDURE — 93005 ELECTROCARDIOGRAM TRACING: CPT

## 2024-07-01 PROCEDURE — 82948 REAGENT STRIP/BLOOD GLUCOSE: CPT

## 2024-07-01 PROCEDURE — 70450 CT HEAD/BRAIN W/O DYE: CPT

## 2024-07-01 PROCEDURE — 80053 COMPREHEN METABOLIC PANEL: CPT | Performed by: EMERGENCY MEDICINE

## 2024-07-01 PROCEDURE — 85025 COMPLETE CBC W/AUTO DIFF WBC: CPT | Performed by: EMERGENCY MEDICINE

## 2024-07-01 PROCEDURE — 80179 DRUG ASSAY SALICYLATE: CPT | Performed by: EMERGENCY MEDICINE

## 2024-07-01 PROCEDURE — 85610 PROTHROMBIN TIME: CPT | Performed by: EMERGENCY MEDICINE

## 2024-07-01 PROCEDURE — 99285 EMERGENCY DEPT VISIT HI MDM: CPT | Performed by: EMERGENCY MEDICINE

## 2024-07-01 PROCEDURE — 74177 CT ABD & PELVIS W/CONTRAST: CPT

## 2024-07-01 PROCEDURE — 80143 DRUG ASSAY ACETAMINOPHEN: CPT | Performed by: EMERGENCY MEDICINE

## 2024-07-01 PROCEDURE — 82077 ASSAY SPEC XCP UR&BREATH IA: CPT | Performed by: EMERGENCY MEDICINE

## 2024-07-01 RX ORDER — LANOLIN ALCOHOL/MO/W.PET/CERES
100 CREAM (GRAM) TOPICAL ONCE
Status: DISCONTINUED | OUTPATIENT
Start: 2024-07-01 | End: 2024-07-01 | Stop reason: HOSPADM

## 2024-07-01 RX ORDER — FOLIC ACID 1 MG/1
1 TABLET ORAL ONCE
Status: DISCONTINUED | OUTPATIENT
Start: 2024-07-01 | End: 2024-07-01 | Stop reason: HOSPADM

## 2024-07-01 RX ADMIN — IOHEXOL 100 ML: 350 INJECTION, SOLUTION INTRAVENOUS at 17:22
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
## 2024-07-01 NOTE — TRAUMA DOCUMENTATION
Patient elected to leave AMA and removed his IV. Patient found bleeding on floor. Pressure dressing applied and patient cleaned up.  Provider reviewed AMA documentation and patient signed. Patient escorted by family and tech to car. Patient advised to follow up with his PCP. Patient A&0x4.

## 2024-07-02 LAB
ATRIAL RATE: 106 BPM
P AXIS: 80 DEGREES
PR INTERVAL: 156 MS
QRS AXIS: 87 DEGREES
QRSD INTERVAL: 104 MS
QT INTERVAL: 374 MS
QTC INTERVAL: 496 MS
T WAVE AXIS: 56 DEGREES
VENTRICULAR RATE: 106 BPM

## 2024-07-02 PROCEDURE — 93010 ELECTROCARDIOGRAM REPORT: CPT | Performed by: INTERNAL MEDICINE

## 2024-07-09 ENCOUNTER — APPOINTMENT (EMERGENCY)
Dept: RADIOLOGY | Facility: HOSPITAL | Age: 49
DRG: 770 | End: 2024-07-09
Payer: COMMERCIAL

## 2024-07-09 ENCOUNTER — HOSPITAL ENCOUNTER (INPATIENT)
Facility: HOSPITAL | Age: 49
LOS: 3 days | Discharge: LEFT BEFORE DISCHARGE | DRG: 770 | End: 2024-07-12
Attending: EMERGENCY MEDICINE | Admitting: INTERNAL MEDICINE
Payer: COMMERCIAL

## 2024-07-09 DIAGNOSIS — F10.939 ALCOHOL WITHDRAWAL (HCC): Primary | ICD-10-CM

## 2024-07-09 DIAGNOSIS — R55 SYNCOPE: ICD-10-CM
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
## 2024-07-09 DIAGNOSIS — K70.30 ALCOHOLIC CIRRHOSIS OF LIVER WITHOUT ASCITES (HCC): ICD-10-CM

## 2024-07-09 DIAGNOSIS — K70.30 ALCOHOLIC CIRRHOSIS (HCC): ICD-10-CM

## 2024-07-09 DIAGNOSIS — T14.91XA SUICIDAL BEHAVIOR WITH ATTEMPTED SELF-INJURY (HCC): ICD-10-CM

## 2024-07-09 PROBLEM — F10.921 ALCOHOL INTOXICATION WITH DELIRIUM (HCC): Status: ACTIVE | Noted: 2024-07-09

## 2024-07-09 PROBLEM — R93.5 ABNORMAL ABDOMINAL CT SCAN: Status: ACTIVE | Noted: 2024-07-09

## 2024-07-09 PROBLEM — R26.2 AMBULATORY DYSFUNCTION: Status: ACTIVE | Noted: 2024-07-09

## 2024-07-09 PROBLEM — E11.9 TYPE 2 DIABETES MELLITUS, WITHOUT LONG-TERM CURRENT USE OF INSULIN (HCC): Status: ACTIVE | Noted: 2024-07-09

## 2024-07-09 PROBLEM — D69.6 THROMBOCYTOPENIA (HCC): Status: ACTIVE | Noted: 2024-07-09

## 2024-07-09 LAB
2HR DELTA HS TROPONIN: 1 NG/L
4HR DELTA HS TROPONIN: 2 NG/L
ALBUMIN SERPL BCG-MCNC: 3.4 G/DL (ref 3.5–5)
ALP SERPL-CCNC: 91 U/L (ref 34–104)
ALT SERPL W P-5'-P-CCNC: 24 U/L (ref 7–52)
AMPHETAMINES SERPL QL SCN: NEGATIVE
ANION GAP SERPL CALCULATED.3IONS-SCNC: 7 MMOL/L (ref 4–13)
AST SERPL W P-5'-P-CCNC: 102 U/L (ref 13–39)
ATRIAL RATE: 103 BPM
BARBITURATES UR QL: NEGATIVE
BASOPHILS # BLD AUTO: 0.06 THOUSANDS/ÂΜL (ref 0–0.1)
BASOPHILS NFR BLD AUTO: 1 % (ref 0–1)
BENZODIAZ UR QL: NEGATIVE
BILIRUB SERPL-MCNC: 3.46 MG/DL (ref 0.2–1)
BUN SERPL-MCNC: 5 MG/DL (ref 5–25)
CALCIUM ALBUM COR SERPL-MCNC: 8.4 MG/DL (ref 8.3–10.1)
CALCIUM SERPL-MCNC: 7.9 MG/DL (ref 8.4–10.2)
CARDIAC TROPONIN I PNL SERPL HS: 6 NG/L
CARDIAC TROPONIN I PNL SERPL HS: 7 NG/L
CARDIAC TROPONIN I PNL SERPL HS: 8 NG/L
CHLORIDE SERPL-SCNC: 110 MMOL/L (ref 96–108)
CO2 SERPL-SCNC: 26 MMOL/L (ref 21–32)
COCAINE UR QL: NEGATIVE
CREAT SERPL-MCNC: 0.57 MG/DL (ref 0.6–1.3)
EOSINOPHIL # BLD AUTO: 0.04 THOUSAND/ÂΜL (ref 0–0.61)
EOSINOPHIL NFR BLD AUTO: 1 % (ref 0–6)
ERYTHROCYTE [DISTWIDTH] IN BLOOD BY AUTOMATED COUNT: 23.2 % (ref 11.6–15.1)
ETHANOL EXG-MCNC: 355 MG/DL
FENTANYL UR QL SCN: NEGATIVE
GFR SERPL CREATININE-BSD FRML MDRD: 120 ML/MIN/1.73SQ M
GLUCOSE SERPL-MCNC: 110 MG/DL (ref 65–140)
HCT VFR BLD AUTO: 37.1 % (ref 36.5–49.3)
HGB BLD-MCNC: 12.3 G/DL (ref 12–17)
HYDROCODONE UR QL SCN: NEGATIVE
IMM GRANULOCYTES # BLD AUTO: 0.02 THOUSAND/UL (ref 0–0.2)
IMM GRANULOCYTES NFR BLD AUTO: 0 % (ref 0–2)
INR PPP: 1.77 (ref 0.84–1.19)
LIPASE SERPL-CCNC: 116 U/L (ref 11–82)
LYMPHOCYTES # BLD AUTO: 1.12 THOUSANDS/ÂΜL (ref 0.6–4.47)
LYMPHOCYTES NFR BLD AUTO: 21 % (ref 14–44)
MAGNESIUM SERPL-MCNC: 1.9 MG/DL (ref 1.9–2.7)
MCH RBC QN AUTO: 27.7 PG (ref 26.8–34.3)
MCHC RBC AUTO-ENTMCNC: 33.2 G/DL (ref 31.4–37.4)
MCV RBC AUTO: 84 FL (ref 82–98)
METHADONE UR QL: NEGATIVE
MONOCYTES # BLD AUTO: 0.67 THOUSAND/ÂΜL (ref 0.17–1.22)
MONOCYTES NFR BLD AUTO: 13 % (ref 4–12)
NEUTROPHILS # BLD AUTO: 3.33 THOUSANDS/ÂΜL (ref 1.85–7.62)
NEUTS SEG NFR BLD AUTO: 64 % (ref 43–75)
NRBC BLD AUTO-RTO: 0 /100 WBCS
OPIATES UR QL SCN: NEGATIVE
OXYCODONE+OXYMORPHONE UR QL SCN: NEGATIVE
P AXIS: 42 DEGREES
PCP UR QL: NEGATIVE
PHOSPHATE SERPL-MCNC: 4.2 MG/DL (ref 2.7–4.5)
PLATELET # BLD AUTO: 69 THOUSANDS/UL (ref 149–390)
PMV BLD AUTO: 9.7 FL (ref 8.9–12.7)
POTASSIUM SERPL-SCNC: 3.2 MMOL/L (ref 3.5–5.3)
PR INTERVAL: 152 MS
PROT SERPL-MCNC: 7.5 G/DL (ref 6.4–8.4)
PROTHROMBIN TIME: 20.3 SECONDS (ref 11.6–14.5)
QRS AXIS: 259 DEGREES
QRSD INTERVAL: 108 MS
QT INTERVAL: 376 MS
QTC INTERVAL: 492 MS
RBC # BLD AUTO: 4.44 MILLION/UL (ref 3.88–5.62)
SODIUM SERPL-SCNC: 143 MMOL/L (ref 135–147)
T WAVE AXIS: 11 DEGREES
THC UR QL: POSITIVE
TSH SERPL DL<=0.05 MIU/L-ACNC: 2.71 UIU/ML (ref 0.45–4.5)
VENTRICULAR RATE: 103 BPM
WBC # BLD AUTO: 5.24 THOUSAND/UL (ref 4.31–10.16)

## 2024-07-09 PROCEDURE — 84443 ASSAY THYROID STIM HORMONE: CPT

## 2024-07-09 PROCEDURE — 93005 ELECTROCARDIOGRAM TRACING: CPT

## 2024-07-09 PROCEDURE — 73552 X-RAY EXAM OF FEMUR 2/>: CPT

## 2024-07-09 PROCEDURE — 99285 EMERGENCY DEPT VISIT HI MDM: CPT

## 2024-07-09 PROCEDURE — 96365 THER/PROPH/DIAG IV INF INIT: CPT

## 2024-07-09 PROCEDURE — 85610 PROTHROMBIN TIME: CPT

## 2024-07-09 PROCEDURE — 73590 X-RAY EXAM OF LOWER LEG: CPT

## 2024-07-09 PROCEDURE — 80307 DRUG TEST PRSMV CHEM ANLYZR: CPT

## 2024-07-09 PROCEDURE — 99285 EMERGENCY DEPT VISIT HI MDM: CPT | Performed by: EMERGENCY MEDICINE

## 2024-07-09 PROCEDURE — 96375 TX/PRO/DX INJ NEW DRUG ADDON: CPT

## 2024-07-09 PROCEDURE — 93010 ELECTROCARDIOGRAM REPORT: CPT | Performed by: INTERNAL MEDICINE

## 2024-07-09 PROCEDURE — 70450 CT HEAD/BRAIN W/O DYE: CPT

## 2024-07-09 PROCEDURE — 82075 ASSAY OF BREATH ETHANOL: CPT

## 2024-07-09 PROCEDURE — 85025 COMPLETE CBC W/AUTO DIFF WBC: CPT

## 2024-07-09 PROCEDURE — 80053 COMPREHEN METABOLIC PANEL: CPT

## 2024-07-09 PROCEDURE — 96366 THER/PROPH/DIAG IV INF ADDON: CPT

## 2024-07-09 PROCEDURE — 84484 ASSAY OF TROPONIN QUANT: CPT

## 2024-07-09 PROCEDURE — 83690 ASSAY OF LIPASE: CPT

## 2024-07-09 PROCEDURE — 99223 1ST HOSP IP/OBS HIGH 75: CPT | Performed by: INTERNAL MEDICINE

## 2024-07-09 PROCEDURE — 83735 ASSAY OF MAGNESIUM: CPT

## 2024-07-09 PROCEDURE — 74177 CT ABD & PELVIS W/CONTRAST: CPT

## 2024-07-09 PROCEDURE — 71260 CT THORAX DX C+: CPT

## 2024-07-09 PROCEDURE — 84100 ASSAY OF PHOSPHORUS: CPT

## 2024-07-09 PROCEDURE — 36415 COLL VENOUS BLD VENIPUNCTURE: CPT

## 2024-07-09 PROCEDURE — 72125 CT NECK SPINE W/O DYE: CPT

## 2024-07-09 RX ORDER — HYDROMORPHONE HCL/PF 1 MG/ML
0.5 SYRINGE (ML) INJECTION ONCE
Status: COMPLETED | OUTPATIENT
Start: 2024-07-09 | End: 2024-07-09

## 2024-07-09 RX ORDER — INSULIN LISPRO 100 [IU]/ML
1-5 INJECTION, SOLUTION INTRAVENOUS; SUBCUTANEOUS
Status: DISCONTINUED | OUTPATIENT
Start: 2024-07-09 | End: 2024-07-12 | Stop reason: HOSPADM

## 2024-07-09 RX ORDER — KETOTIFEN FUMARATE 0.35 MG/ML
1 SOLUTION/ DROPS OPHTHALMIC 2 TIMES DAILY
Status: DISCONTINUED | OUTPATIENT
Start: 2024-07-09 | End: 2024-07-12 | Stop reason: HOSPADM

## 2024-07-09 RX ORDER — LORAZEPAM 1 MG/1
2 TABLET ORAL EVERY 6 HOURS
Status: COMPLETED | OUTPATIENT
Start: 2024-07-09 | End: 2024-07-10

## 2024-07-09 RX ORDER — LORAZEPAM 1 MG/1
2 TABLET ORAL EVERY 12 HOURS
Status: DISCONTINUED | OUTPATIENT
Start: 2024-07-11 | End: 2024-07-12 | Stop reason: HOSPADM

## 2024-07-09 RX ORDER — METOPROLOL TARTRATE 1 MG/ML
2.5 INJECTION, SOLUTION INTRAVENOUS EVERY 6 HOURS PRN
Status: DISCONTINUED | OUTPATIENT
Start: 2024-07-09 | End: 2024-07-12 | Stop reason: HOSPADM

## 2024-07-09 RX ORDER — FOLIC ACID 1 MG/1
1 TABLET ORAL DAILY
Status: DISCONTINUED | OUTPATIENT
Start: 2024-07-09 | End: 2024-07-12 | Stop reason: HOSPADM

## 2024-07-09 RX ORDER — POTASSIUM CHLORIDE 20 MEQ/1
40 TABLET, EXTENDED RELEASE ORAL ONCE
Status: COMPLETED | OUTPATIENT
Start: 2024-07-09 | End: 2024-07-09

## 2024-07-09 RX ORDER — SODIUM CHLORIDE, SODIUM GLUCONATE, SODIUM ACETATE, POTASSIUM CHLORIDE, MAGNESIUM CHLORIDE, SODIUM PHOSPHATE, DIBASIC, AND POTASSIUM PHOSPHATE .53; .5; .37; .037; .03; .012; .00082 G/100ML; G/100ML; G/100ML; G/100ML; G/100ML; G/100ML; G/100ML
75 INJECTION, SOLUTION INTRAVENOUS CONTINUOUS
Status: DISCONTINUED | OUTPATIENT
Start: 2024-07-09 | End: 2024-07-12 | Stop reason: HOSPADM

## 2024-07-09 RX ORDER — LORAZEPAM 1 MG/1
2 TABLET ORAL EVERY 8 HOURS
Status: COMPLETED | OUTPATIENT
Start: 2024-07-10 | End: 2024-07-11

## 2024-07-09 RX ORDER — ACETAMINOPHEN 325 MG/1
650 TABLET ORAL EVERY 6 HOURS PRN
Status: DISCONTINUED | OUTPATIENT
Start: 2024-07-09 | End: 2024-07-12 | Stop reason: HOSPADM

## 2024-07-09 RX ORDER — OXYCODONE HYDROCHLORIDE 5 MG/1
5 TABLET ORAL EVERY 4 HOURS PRN
Status: DISCONTINUED | OUTPATIENT
Start: 2024-07-09 | End: 2024-07-12 | Stop reason: HOSPADM

## 2024-07-09 RX ORDER — METOPROLOL TARTRATE 1 MG/ML
5 INJECTION, SOLUTION INTRAVENOUS EVERY 6 HOURS PRN
Status: DISCONTINUED | OUTPATIENT
Start: 2024-07-09 | End: 2024-07-09

## 2024-07-09 RX ORDER — ONDANSETRON 2 MG/ML
4 INJECTION INTRAMUSCULAR; INTRAVENOUS EVERY 6 HOURS PRN
Status: DISCONTINUED | OUTPATIENT
Start: 2024-07-09 | End: 2024-07-12 | Stop reason: HOSPADM

## 2024-07-09 RX ORDER — LANOLIN ALCOHOL/MO/W.PET/CERES
100 CREAM (GRAM) TOPICAL DAILY
Status: DISCONTINUED | OUTPATIENT
Start: 2024-07-09 | End: 2024-07-12 | Stop reason: HOSPADM

## 2024-07-09 RX ORDER — FERROUS SULFATE 325(65) MG
325 TABLET ORAL
Status: DISCONTINUED | OUTPATIENT
Start: 2024-07-10 | End: 2024-07-12 | Stop reason: HOSPADM

## 2024-07-09 RX ORDER — SODIUM CHLORIDE, SODIUM GLUCONATE, SODIUM ACETATE, POTASSIUM CHLORIDE, MAGNESIUM CHLORIDE, SODIUM PHOSPHATE, DIBASIC, AND POTASSIUM PHOSPHATE .53; .5; .37; .037; .03; .012; .00082 G/100ML; G/100ML; G/100ML; G/100ML; G/100ML; G/100ML; G/100ML
1000 INJECTION, SOLUTION INTRAVENOUS ONCE
Status: COMPLETED | OUTPATIENT
Start: 2024-07-09 | End: 2024-07-09

## 2024-07-09 RX ORDER — DIAZEPAM 5 MG/1
10 TABLET ORAL ONCE
Status: COMPLETED | OUTPATIENT
Start: 2024-07-09 | End: 2024-07-09

## 2024-07-09 RX ORDER — HEPARIN SODIUM 5000 [USP'U]/ML
5000 INJECTION, SOLUTION INTRAVENOUS; SUBCUTANEOUS EVERY 8 HOURS SCHEDULED
Status: DISCONTINUED | OUTPATIENT
Start: 2024-07-09 | End: 2024-07-10

## 2024-07-09 RX ORDER — NICOTINE 21 MG/24HR
1 PATCH, TRANSDERMAL 24 HOURS TRANSDERMAL DAILY
Status: DISCONTINUED | OUTPATIENT
Start: 2024-07-09 | End: 2024-07-12 | Stop reason: HOSPADM

## 2024-07-09 RX ORDER — LORAZEPAM 1 MG/1
1 TABLET ORAL EVERY 24 HOURS
Status: DISCONTINUED | OUTPATIENT
Start: 2024-07-13 | End: 2024-07-12 | Stop reason: HOSPADM

## 2024-07-09 RX ADMIN — IOHEXOL 100 ML: 350 INJECTION, SOLUTION INTRAVENOUS at 13:16

## 2024-07-09 RX ADMIN — OXYCODONE HYDROCHLORIDE 5 MG: 5 TABLET ORAL at 22:41

## 2024-07-09 RX ADMIN — FOLIC ACID 1 MG: 1 TABLET ORAL at 17:38

## 2024-07-09 RX ADMIN — POTASSIUM CHLORIDE 40 MEQ: 1500 TABLET, EXTENDED RELEASE ORAL at 13:43

## 2024-07-09 RX ADMIN — Medication 1 TABLET: at 17:38

## 2024-07-09 RX ADMIN — LORAZEPAM 2 MG: 1 TABLET ORAL at 17:38

## 2024-07-09 RX ADMIN — DIAZEPAM 10 MG: 5 TABLET ORAL at 16:36

## 2024-07-09 RX ADMIN — NICOTINE 1 PATCH: 21 PATCH, EXTENDED RELEASE TRANSDERMAL at 17:38

## 2024-07-09 RX ADMIN — THIAMINE HCL TAB 100 MG 100 MG: 100 TAB at 17:38

## 2024-07-09 RX ADMIN — TRAZODONE HYDROCHLORIDE 150 MG: 50 TABLET ORAL at 22:30

## 2024-07-09 RX ADMIN — HEPARIN SODIUM 5000 UNITS: 5000 INJECTION INTRAVENOUS; SUBCUTANEOUS at 22:31

## 2024-07-09 RX ADMIN — LORAZEPAM 2 MG: 1 TABLET ORAL at 22:31

## 2024-07-09 RX ADMIN — HYDROMORPHONE HYDROCHLORIDE 0.5 MG: 1 INJECTION, SOLUTION INTRAMUSCULAR; INTRAVENOUS; SUBCUTANEOUS at 12:22

## 2024-07-09 RX ADMIN — SODIUM CHLORIDE, SODIUM GLUCONATE, SODIUM ACETATE, POTASSIUM CHLORIDE, MAGNESIUM CHLORIDE, SODIUM PHOSPHATE, DIBASIC, AND POTASSIUM PHOSPHATE 1000 ML: .53; .5; .37; .037; .03; .012; .00082 INJECTION, SOLUTION INTRAVENOUS at 12:23

## 2024-07-09 RX ADMIN — SODIUM CHLORIDE, SODIUM GLUCONATE, SODIUM ACETATE, POTASSIUM CHLORIDE, MAGNESIUM CHLORIDE, SODIUM PHOSPHATE, DIBASIC, AND POTASSIUM PHOSPHATE 100 ML/HR: .53; .5; .37; .037; .03; .012; .00082 INJECTION, SOLUTION INTRAVENOUS at 17:55
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
## 2024-07-09 NOTE — ASSESSMENT & PLAN NOTE
Patient has a history of alcohol abuse.   He drinks daily. Last drink this morning, 7/9/24  Presents for symptoms of withdrawal: shakiness, general discomfort  See plan above
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
## 2024-07-09 NOTE — CERTIFIED RECOVERY SPECIALIST
"   Certified  Note    Patient name: Robert Combs  [facility]  [place]: [facility]  Attending:  Hamlet Morse MD MRN [id number]  : 1975  Age: 48 y.o.    Sex: male Date 2024         Substance Use History:     Social History     Substance and Sexual Activity   Alcohol Use Yes    Alcohol/week: 28.0 standard drinks of alcohol    Types: 28 Cans of beer per week    Comment: 3-4 cans of beer a day        Social History     Substance and Sexual Activity   Drug Use Yes    Frequency: 7.0 times per week    Types: Marijuana    Comment: medical     CRS met with patient and his friend Liliana. Patient appears upset and somewhat still intoxicated with slurring, but able to talk openly about how he is feeling. Patient stated he can't stop drinking  without getting very sick. He also has self harm cuts on his left forearm (spells \"kill me\"). He stated he was trying to tell his wife that message but she wasn't listening. He said he is depressed and also has been in mental health facilities in his younger years. He said he feels alone and that no one cares. Doctor visited patient bedside also to explain he needs detox from the alcohol and then the other concerns will be addressed once he is sober and medically stable. Patient is in agreement. Doctor consulting with toxicology and trying to send him to  Detox. Doctor told patient he will advise.    CRS to follow and support while patient in hospital / ED.               '          Vidya Wang"

## 2024-07-09 NOTE — ED ATTENDING ATTESTATION
7/9/2024  I, Hamlet Morse MD, saw and evaluated the patient. I have discussed the patient with the resident/non-physician practitioner and agree with the resident's/non-physician practitioner's findings, Plan of Care, and MDM as documented in the resident's/non-physician practitioner's note, except where noted. All available labs and Radiology studies were reviewed.  I was present for key portions of any procedure(s) performed by the resident/non-physician practitioner and I was immediately available to provide assistance.       At this point I agree with the current assessment done in the Emergency Department.  I have conducted an independent evaluation of this patient a history and physical is as follows:    ED Course         Critical Care Time  Procedures    47 yo male urged to come in for increased depression, used knife on left wrist and girlfriend found him with rope and pt admits to si. Pt uses alcohol daily and drank today. Pt with pmh cholelithiasis, multiple falls and syncope. Pt with pain all over, having withdrawal effects and taking benzos as outpatient.  Vss, afebrile, tachy, intoxicated currently, lungs cta, rrr, abdomen soft nontender, no neuro deficits, left leg tenderness.  Ct head, cspine, c/a/p.  Left femur xray,  geriatric psych workup, ivf.  Consder detox admission.

## 2024-07-09 NOTE — ASSESSMENT & PLAN NOTE
Patient has a history of alcohol abuse.   He drinks daily. Last drink this morning, 7/9/24  Presents for symptoms of withdrawal: shakiness, general discomfort  Given Valium and Dilauded in ED  Admit, monitor and manage withdrawal  Start CIWA protocol   Start Ativan, folic acid, thiamine and multivitamin  IV fluid for hydration  Monitor for worsening symptoms, AMS, seizure  Replace electrolytes

## 2024-07-09 NOTE — ASSESSMENT & PLAN NOTE
With fall secondary to alcohol abuse  Head CT and cervical spine CT scan without acute abnormality  Follow on left hip and left lower extremity x-ray  PT OT eval

## 2024-07-09 NOTE — ASSESSMENT & PLAN NOTE
Underlying history of decompensated liver cirrhosis and thrombocytopenia  Jaundice and elevated LFTs  Monitor

## 2024-07-09 NOTE — ASSESSMENT & PLAN NOTE
Liver cirrhosis with findings of portal hypertension. Numerous hepatic hypodensities may be further evaluated with nonemergent contrast-enhanced hepatic MRI.

## 2024-07-09 NOTE — ASSESSMENT & PLAN NOTE
"Lab Results   Component Value Date    HGBA1C 8.0 (A) 12/21/2023       No results for input(s): \"POCGLU\" in the last 72 hours.    Blood Sugar Average: Last 72 hrs:  Hold metformin  Start insulin sliding scale  Monitor      "

## 2024-07-10 LAB
ALBUMIN SERPL BCG-MCNC: 2.8 G/DL (ref 3.5–5)
ALP SERPL-CCNC: 87 U/L (ref 34–104)
ALT SERPL W P-5'-P-CCNC: 19 U/L (ref 7–52)
ANION GAP SERPL CALCULATED.3IONS-SCNC: 8 MMOL/L (ref 4–13)
AST SERPL W P-5'-P-CCNC: 87 U/L (ref 13–39)
BASOPHILS # BLD AUTO: 0.02 THOUSANDS/ÂΜL (ref 0–0.1)
BASOPHILS NFR BLD AUTO: 1 % (ref 0–1)
BILIRUB SERPL-MCNC: 3.15 MG/DL (ref 0.2–1)
BUN SERPL-MCNC: 6 MG/DL (ref 5–25)
CALCIUM ALBUM COR SERPL-MCNC: 8.3 MG/DL (ref 8.3–10.1)
CALCIUM SERPL-MCNC: 7.3 MG/DL (ref 8.4–10.2)
CHLORIDE SERPL-SCNC: 107 MMOL/L (ref 96–108)
CO2 SERPL-SCNC: 26 MMOL/L (ref 21–32)
CREAT SERPL-MCNC: 0.53 MG/DL (ref 0.6–1.3)
EOSINOPHIL # BLD AUTO: 0.09 THOUSAND/ÂΜL (ref 0–0.61)
EOSINOPHIL NFR BLD AUTO: 3 % (ref 0–6)
ERYTHROCYTE [DISTWIDTH] IN BLOOD BY AUTOMATED COUNT: 22.5 % (ref 11.6–15.1)
EST. AVERAGE GLUCOSE BLD GHB EST-MCNC: 94 MG/DL
GFR SERPL CREATININE-BSD FRML MDRD: 124 ML/MIN/1.73SQ M
GLUCOSE SERPL-MCNC: 100 MG/DL (ref 65–140)
GLUCOSE SERPL-MCNC: 100 MG/DL (ref 65–140)
GLUCOSE SERPL-MCNC: 116 MG/DL (ref 65–140)
GLUCOSE SERPL-MCNC: 144 MG/DL (ref 65–140)
GLUCOSE SERPL-MCNC: 99 MG/DL (ref 65–140)
HBA1C MFR BLD: 4.9 %
HCT VFR BLD AUTO: 31.8 % (ref 36.5–49.3)
HGB BLD-MCNC: 10.5 G/DL (ref 12–17)
IMM GRANULOCYTES # BLD AUTO: 0.01 THOUSAND/UL (ref 0–0.2)
IMM GRANULOCYTES NFR BLD AUTO: 0 % (ref 0–2)
LYMPHOCYTES # BLD AUTO: 1.15 THOUSANDS/ÂΜL (ref 0.6–4.47)
LYMPHOCYTES NFR BLD AUTO: 31 % (ref 14–44)
MAGNESIUM SERPL-MCNC: 1.5 MG/DL (ref 1.9–2.7)
MCH RBC QN AUTO: 28 PG (ref 26.8–34.3)
MCHC RBC AUTO-ENTMCNC: 33 G/DL (ref 31.4–37.4)
MCV RBC AUTO: 85 FL (ref 82–98)
MONOCYTES # BLD AUTO: 0.4 THOUSAND/ÂΜL (ref 0.17–1.22)
MONOCYTES NFR BLD AUTO: 11 % (ref 4–12)
NEUTROPHILS # BLD AUTO: 1.99 THOUSANDS/ÂΜL (ref 1.85–7.62)
NEUTS SEG NFR BLD AUTO: 54 % (ref 43–75)
NRBC BLD AUTO-RTO: 0 /100 WBCS
PHOSPHATE SERPL-MCNC: 3.5 MG/DL (ref 2.7–4.5)
PLATELET # BLD AUTO: 50 THOUSANDS/UL (ref 149–390)
PLATELET BLD QL SMEAR: ABNORMAL
POTASSIUM SERPL-SCNC: 3.2 MMOL/L (ref 3.5–5.3)
PROT SERPL-MCNC: 6 G/DL (ref 6.4–8.4)
RBC # BLD AUTO: 3.75 MILLION/UL (ref 3.88–5.62)
RBC MORPH BLD: PRESENT
SODIUM SERPL-SCNC: 141 MMOL/L (ref 135–147)
WBC # BLD AUTO: 3.66 THOUSAND/UL (ref 4.31–10.16)

## 2024-07-10 PROCEDURE — 82948 REAGENT STRIP/BLOOD GLUCOSE: CPT

## 2024-07-10 PROCEDURE — 83735 ASSAY OF MAGNESIUM: CPT | Performed by: INTERNAL MEDICINE

## 2024-07-10 PROCEDURE — 83036 HEMOGLOBIN GLYCOSYLATED A1C: CPT | Performed by: INTERNAL MEDICINE

## 2024-07-10 PROCEDURE — 84100 ASSAY OF PHOSPHORUS: CPT | Performed by: INTERNAL MEDICINE

## 2024-07-10 PROCEDURE — 80053 COMPREHEN METABOLIC PANEL: CPT | Performed by: INTERNAL MEDICINE

## 2024-07-10 PROCEDURE — 99232 SBSQ HOSP IP/OBS MODERATE 35: CPT | Performed by: STUDENT IN AN ORGANIZED HEALTH CARE EDUCATION/TRAINING PROGRAM

## 2024-07-10 PROCEDURE — 85025 COMPLETE CBC W/AUTO DIFF WBC: CPT | Performed by: INTERNAL MEDICINE

## 2024-07-10 PROCEDURE — 97163 PT EVAL HIGH COMPLEX 45 MIN: CPT

## 2024-07-10 PROCEDURE — 97167 OT EVAL HIGH COMPLEX 60 MIN: CPT

## 2024-07-10 PROCEDURE — 99222 1ST HOSP IP/OBS MODERATE 55: CPT | Performed by: PSYCHIATRY & NEUROLOGY

## 2024-07-10 RX ORDER — LORAZEPAM 1 MG/1
2 TABLET ORAL ONCE
Status: COMPLETED | OUTPATIENT
Start: 2024-07-10 | End: 2024-07-10

## 2024-07-10 RX ADMIN — LORAZEPAM 2 MG: 1 TABLET ORAL at 17:20

## 2024-07-10 RX ADMIN — LORAZEPAM 2 MG: 1 TABLET ORAL at 05:13

## 2024-07-10 RX ADMIN — OXYCODONE HYDROCHLORIDE 5 MG: 5 TABLET ORAL at 15:52

## 2024-07-10 RX ADMIN — THIAMINE HCL TAB 100 MG 100 MG: 100 TAB at 08:25

## 2024-07-10 RX ADMIN — FOLIC ACID 1 MG: 1 TABLET ORAL at 08:25

## 2024-07-10 RX ADMIN — HEPARIN SODIUM 5000 UNITS: 5000 INJECTION INTRAVENOUS; SUBCUTANEOUS at 05:13

## 2024-07-10 RX ADMIN — Medication 1 TABLET: at 08:25

## 2024-07-10 RX ADMIN — ONDANSETRON 4 MG: 2 INJECTION INTRAMUSCULAR; INTRAVENOUS at 20:37

## 2024-07-10 RX ADMIN — LORAZEPAM 2 MG: 1 TABLET ORAL at 21:49

## 2024-07-10 RX ADMIN — TRAZODONE HYDROCHLORIDE 150 MG: 50 TABLET ORAL at 21:29

## 2024-07-10 RX ADMIN — ONDANSETRON 4 MG: 2 INJECTION INTRAMUSCULAR; INTRAVENOUS at 13:07

## 2024-07-10 RX ADMIN — SODIUM CHLORIDE, SODIUM GLUCONATE, SODIUM ACETATE, POTASSIUM CHLORIDE, MAGNESIUM CHLORIDE, SODIUM PHOSPHATE, DIBASIC, AND POTASSIUM PHOSPHATE 100 ML/HR: .53; .5; .37; .037; .03; .012; .00082 INJECTION, SOLUTION INTRAVENOUS at 15:26

## 2024-07-10 RX ADMIN — SODIUM CHLORIDE, SODIUM GLUCONATE, SODIUM ACETATE, POTASSIUM CHLORIDE, MAGNESIUM CHLORIDE, SODIUM PHOSPHATE, DIBASIC, AND POTASSIUM PHOSPHATE 100 ML/HR: .53; .5; .37; .037; .03; .012; .00082 INJECTION, SOLUTION INTRAVENOUS at 04:51

## 2024-07-10 RX ADMIN — FERROUS SULFATE TAB 325 MG (65 MG ELEMENTAL FE) 325 MG: 325 (65 FE) TAB at 08:25

## 2024-07-10 RX ADMIN — LORAZEPAM 2 MG: 1 TABLET ORAL at 11:09

## 2024-07-10 RX ADMIN — OXYCODONE HYDROCHLORIDE 5 MG: 5 TABLET ORAL at 08:27

## 2024-07-10 RX ADMIN — OXYCODONE HYDROCHLORIDE 5 MG: 5 TABLET ORAL at 21:29

## 2024-07-10 RX ADMIN — HEPARIN SODIUM 5000 UNITS: 5000 INJECTION INTRAVENOUS; SUBCUTANEOUS at 13:07

## 2024-07-10 RX ADMIN — NICOTINE 1 PATCH: 21 PATCH, EXTENDED RELEASE TRANSDERMAL at 08:25

## 2024-07-10 NOTE — PLAN OF CARE
Problem: PAIN - ADULT  Goal: Verbalizes/displays adequate comfort level or baseline comfort level  Description: Interventions:  - Encourage patient to monitor pain and request assistance  - Assess pain using appropriate pain scale  - Administer analgesics based on type and severity of pain and evaluate response  - Implement non-pharmacological measures as appropriate and evaluate response  - Consider cultural and social influences on pain and pain management  - Notify physician/advanced practitioner if interventions unsuccessful or patient reports new pain  Outcome: Progressing     Problem: INFECTION - ADULT  Goal: Absence or prevention of progression during hospitalization  Description: INTERVENTIONS:  - Assess and monitor for signs and symptoms of infection  - Monitor lab/diagnostic results  - Monitor all insertion sites, i.e. indwelling lines, tubes, and drains  - Monitor endotracheal if appropriate and nasal secretions for changes in amount and color  - [place] appropriate cooling/warming therapies per order  - Administer medications as ordered  - Instruct and encourage patient and family to use good hand hygiene technique  - Identify and instruct in appropriate isolation precautions for identified infection/condition  Outcome: Progressing  Goal: Absence of fever/infection during neutropenic period  Description: INTERVENTIONS:  - Monitor WBC    Outcome: Progressing     Problem: SAFETY ADULT  Goal: Patient will remain free of falls  Description: INTERVENTIONS:  - Educate patient/family on patient safety including physical limitations  - Instruct patient to call for assistance with activity   - Consult OT/PT to assist with strengthening/mobility   - Keep Call bell within reach  - Keep bed low and locked with side rails adjusted as appropriate  - Keep care items and personal belongings within reach  - Initiate and maintain comfort rounds  - Make Fall Risk Sign visible to staff  - Apply yellow socks and bracelet  for high fall risk patients  - Consider moving patient to room near nurses station  Outcome: Progressing  Goal: Maintain or return to baseline ADL function  Description: INTERVENTIONS:  -  Assess patient's ability to carry out ADLs; assess patient's baseline for ADL function and identify physical deficits which impact ability to perform ADLs (bathing, care of mouth/teeth, toileting, grooming, dressing, etc.)  - Assess/evaluate cause of self-care deficits   - Assess range of motion  - Assess patient's mobility; develop plan if impaired  - Assess patient's need for assistive devices and provide as appropriate  - Encourage maximum independence but intervene and supervise when necessary  - Involve family in performance of ADLs  - Assess for home care needs following discharge   - Consider OT consult to assist with ADL evaluation and planning for discharge  - Provide patient education as appropriate  Outcome: Progressing  Goal: Maintains/Returns to pre admission functional level  Description: INTERVENTIONS:  - Perform AM-PAC 6 Click Basic Mobility/ Daily Activity assessment daily.  - Set and communicate daily mobility goal to care team and patient/family/caregiver.   - Collaborate with rehabilitation services on mobility goals if consulted  - Stand patient 3 times a day  - Ambulate patient 3 times a day  - Out of bed to chair 3 times a day   - Out of bed for meals 3 times a day  - Out of bed for toileting  - Record patient progress and toleration of activity level   Outcome: Progressing     Problem: DISCHARGE PLANNING  Goal: Discharge to home or other facility with appropriate resources  Description: INTERVENTIONS:  - Identify barriers to discharge w/patient and caregiver  - Arrange for needed discharge resources and transportation as appropriate  - Identify discharge learning needs (meds, wound care, etc.)  - Arrange for interpretive services to assist at discharge as needed  - Refer to Case Management Department for  coordinating discharge planning if the patient needs post-hospital services based on physician/advanced practitioner order or complex needs related to functional status, cognitive ability, or social support system  Outcome: Progressing     Problem: Knowledge Deficit  Goal: Patient/family/caregiver demonstrates understanding of disease process, treatment plan, medications, and discharge instructions  Description: Complete learning assessment and assess knowledge base.  Interventions:  - Provide teaching at level of understanding  - Provide teaching via preferred learning methods  Outcome: Progressing

## 2024-07-10 NOTE — PLAN OF CARE
Problem: PAIN - ADULT  Goal: Verbalizes/displays adequate comfort level or baseline comfort level  Description: Interventions:  - Encourage patient to monitor pain and request assistance  - Assess pain using appropriate pain scale  - Administer analgesics based on type and severity of pain and evaluate response  - Implement non-pharmacological measures as appropriate and evaluate response  - Consider cultural and social influences on pain and pain management  - Notify physician/advanced practitioner if interventions unsuccessful or patient reports new pain  Outcome: Progressing     Problem: INFECTION - ADULT  Goal: Absence or prevention of progression during hospitalization  Description: INTERVENTIONS:  - Assess and monitor for signs and symptoms of infection  - Monitor lab/diagnostic results  - Monitor all insertion sites, i.e. indwelling lines, tubes, and drains  - Monitor endotracheal if appropriate and nasal secretions for changes in amount and color  - [place] appropriate cooling/warming therapies per order  - Administer medications as ordered  - Instruct and encourage patient and family to use good hand hygiene technique  - Identify and instruct in appropriate isolation precautions for identified infection/condition  Outcome: Progressing  Goal: Absence of fever/infection during neutropenic period  Description: INTERVENTIONS:  - Monitor WBC    Outcome: Progressing     Problem: SAFETY ADULT  Goal: Patient will remain free of falls  Description: INTERVENTIONS:  - Educate patient/family on patient safety including physical limitations  - Instruct patient to call for assistance with activity   - Consult OT/PT to assist with strengthening/mobility   - Keep Call bell within reach  - Keep bed low and locked with side rails adjusted as appropriate  - Keep care items and personal belongings within reach  - Initiate and maintain comfort rounds  - Make Fall Risk Sign visible to staff  - Apply yellow socks and bracelet  Magdalene Payton is a 21 m o  male here for initial developmental evaluation  He was seen by [facility] [place]  at [address] [facility]  Based on information provided by you and your child's therapists and/or teachers and observations and/or testing in clinic today, your child's symptoms fit best with a diagnosis of Global Developmental Delay receptive and expressive language delay, social emotional delays, cognitive communication delays, mild fine motor and gross motor delays; and low tone   -He has some emerging skills with social communication  He will continue to be monitored for Global Developmental Delay and speech apraxia versus autism  His family is to continue to use the techniques learned from therapists on a daily basis to improve [employer]  I am recommending increasing early intervention services and start outpatient assessment by a Speech Pathologist      Gross motor: He did well with Physical Therapy services through Early Intervention  He has mild gross motor delays and has skills closer to 21 months old  Fine Motor: His current skills range between 16-18 months    CAPUTE: CAT/CLAMS   Cognitive Adaptive Test/Clinical Linguistic and Auditory Milestone Scale   A test of your child's general development     -Cognitive adaptive test developmental quotient=  68 5  Age equivalent : range of skills from 9-13 months    -Language developmental quotient= 45  Age equivalent : 9  months     1) Labs recommended: CK, Thyroid, CBCd, cholesterol (can be low in disorder such as [place] Factor) and CMP to assess for developmental delay and low tone  2) Interventions : Early Intervention BEHAVIORAL MEDICINE AT [facility] [place]  It is recommended he continue with Early Intervention services  [facility] has been [facility]    It is recommended he been evaluated by speech pathologist due to significant areas for high fall risk patients  - Consider moving patient to room near nurses station  Outcome: Progressing  Goal: Maintain or return to baseline ADL function  Description: INTERVENTIONS:  -  Assess patient's ability to carry out ADLs; assess patient's baseline for ADL function and identify physical deficits which impact ability to perform ADLs (bathing, care of mouth/teeth, toileting, grooming, dressing, etc.)  - Assess/evaluate cause of self-care deficits   - Assess range of motion  - Assess patient's mobility; develop plan if impaired  - Assess patient's need for assistive devices and provide as appropriate  - Encourage maximum independence but intervene and supervise when necessary  - Involve family in performance of ADLs  - Assess for home care needs following discharge   - Consider OT consult to assist with ADL evaluation and planning for discharge  - Provide patient education as appropriate  Outcome: Progressing  Goal: Maintains/Returns to pre admission functional level  Description: INTERVENTIONS:  - Perform AM-PAC 6 Click Basic Mobility/ Daily Activity assessment daily.  - Set and communicate daily mobility goal to care team and patient/family/caregiver.   - Collaborate with rehabilitation services on mobility goals if consulted  - Stand patient 3 times a day  - Ambulate patient 3 times a day  - Out of bed to chair 3 times a day   - Out of bed for meals 3 times a day  - Out of bed for toileting  - Record patient progress and toleration of activity level   Outcome: Progressing     Problem: DISCHARGE PLANNING  Goal: Discharge to home or other facility with appropriate resources  Description: INTERVENTIONS:  - Identify barriers to discharge w/patient and caregiver  - Arrange for needed discharge resources and transportation as appropriate  - Identify discharge learning needs (meds, wound care, etc.)  - Arrange for interpretive services to assist at discharge as needed  - Refer to Case Management Department for  of speech and receptive and expressive language delays  Consider occupational therapy assessment to improve visual spatial skills and learning new tasks as well as sensory interventions to improve attention to task for his age  Please send in a copy of his new Individualized Education Plan (IEP)/ IFSP at his next appointment  3) Outpatient therapy:   Referral for outpatient speech therapy was provided  It is difficult to assess for speech apraxia at this time due to his age and cognitive communication skills but he does have low tone and coordination differences that put him at a higher risk for this diagnosis  I recommend seeking out a therapist that is proficient in working with children with speech apraxia  ( Ex: Speech language pathologist, Mehnaz Paz)    4) Consider applying for MA:  Use the web site for [employer] for PA MA to apply for disability under your child's diagnosis  Please call our office if they ask you for a letter for your child's diagnosis  -A form with instructions was provided in clinic  Please call our office if you have any questions  -Genetics:  I discussed the option of getting genetic testing due to his Global Developmental Delay and lower tone  I do not feel that getting genetic testing right away will impact his interventions at this time but can be considered if he continues to have delays over the next 6-12 months  About 10% of children with developmental delays have a genetic cause for their developmental delay and may help determine other physical or laboratory screenings that need to be completed or give information about risk for academic difficulties in the future  - Your insurance would need to approve this testing      -Neuro-Imaging: I do not feel he needs an MRI of his brain is needed at this time   He has generalized low tone but there are no specific neurologic defects or significant difference in movements of the right-side verses left side of his body at this time and he continues to make slow but steady progress with developmental skills  Information to review:    Global developmental delay (GDD) is defined as significant delay in two or more developmental areas: motor (gross and fine motor); speech and language; cognition; personal and social development; or adaptive (activities of daily living)  “ Significant” may be defined as performance 2 or more standard deviations below the mean on developmental screening or assessment tests  Extent of delay can be classified as mild if functional age is <33% below chronological age; moderate if functional age is 34-66% of chronological age; severe if functional age is >66% below chronological age  Speech and Language delays: The American Speech-Language-Hearing Association guidelines describe a speech disorder as an impairment of the articulation of speech sounds, fluency, or voice  Language disorder is defined as impaired comprehension or use of spoken, written, or other symbol systems  The latter disorder may involve the form of language (phonology, morphology, syntax), the content of language (semantics), the function of language in communication, or any combination of these  Prelinguistic (prior to using words) communication behaviors (eg, gestures, babbling, joint attention) that are not present can also be signals to later language delays  Children with both speech and language impairment are at risk for language-based learning disabilities and difficulties in school  There are 5 major causes of language delay including hearing impairment, global developmental delays, autism, social deprivation, and isolated language delay   Hearing impairment is one cause that can be easily evaluated with a simple audiological evaluation    -Global Developmental Delay refers to delays in learning across multiple domains including, cognitive, motor, adaptive skills, and language    -Language Delays in autism are coordinating discharge planning if the patient needs post-hospital services based on physician/advanced practitioner order or complex needs related to functional status, cognitive ability, or social support system  Outcome: Progressing     Problem: Knowledge Deficit  Goal: Patient/family/caregiver demonstrates understanding of disease process, treatment plan, medications, and discharge instructions  Description: Complete learning assessment and assess knowledge base.  Interventions:  - Provide teaching at level of understanding  - Provide teaching via preferred learning methods  Outcome: Progressing      caused by a limitation in social awareness and understanding of the reason for communication    -Social deprivation can be caused by maternal depression or other limitations on interaction with the child    -An isolated language delay describes a delay only in language without delays in other areas    -Speech/language delays are generally treated with speech/language therapy  - Children with ADHD often present with listening and/or spoken language comprehension difficulties and are more likely due to higher order language or more global disorder  Language interventions to use at home:    -Read books, read or listen to nursery rhymes and  age-appropriate songs to promote speech and language  -Hold items up closer to your face and give him choices so that he has to look at the adult's face  Also help him point to the item of interest, even though you know what your child wants   -Consider using basic signs to get his attention or as a way to communicate when he is unable to find the word or gets frustrated when he cannot be understood  Let school know you are using signs at home    -Prompt him to use words over actions  -Give him  choices and wait for him  to try to answer before giving him  the choice you know he wants  -Talk to your child's therapists and/or teachers about any visual boards, charts or schedules they are using to promote communication and understand the schedule for the therapy session or daily routine  Use similar visual charts at home with pictures and/or words   Then complete the action that goes with this     -Play: work on coloring, finger paints, stacking blocks, single piece puzzles and imitate pretend play such as drinking from a cup, eating toy food, feeding a baby, racing cars, taking figures on a bus ride, talking on the phone, and imitating household activities including sleeping, cooking, wiping the table or setting a table    -Say everything you are doing out loud and point and make sure your child is watching   (ex: "put on shoe")  -Label actions, items and show possession saying:  "mommy's", "daddy's, [place]'s, "mine" or "yours"  Web sites with additional information and interventions to use at home:    Speech:  www  GALLO org/public (under childhood speech delays)    Www  [employer] (  Developmental language disorder)    Www Tactilemetotalk  com    www babysignlan[employer]    www [employer]  org   (under speech delays)    Speech apps:  Ex  "Speech Blubs"         Development and behaviors:    Www understood  org ( Under developmental-delays)    Www healthychildren  [employer]  org    Www cdc gov (under milestones)  Www cdc gov/ncbddd/developmentaldisabilities/facts html    Low tone:  Www childdevelopConnect Controls com au/areas-of-concern/diagnoses/low-muscle-tone/     Follow up in 6 months to review developmental progress  We will consider additional testing at that time based on his progress with speech and social skills  Thank you for allowing us to take part in your child's care  Please call if there are any questions or concerns prior to his next appointment  Please provide us with any feedback on your visit today, We want to continue to improve communication and interactions with you and other patients that visit this clinic  Dictation software was used to dictate this note  It may contain errors with dictating incorrect words/spelling  Please contact provider directly for any questions

## 2024-07-10 NOTE — CERTIFIED RECOVERY SPECIALIST
Certified  Note    Patient name: Robert Combs  Location: [facility]-01  [place]: [facility]  Attending:  Khanh Jorge MRN [id number]  : 1975  Age: 48 y.o.    Sex: male Date 7/10/2024         Substance Use History:     Social History     Substance and Sexual Activity   Alcohol Use Yes    Alcohol/week: 28.0 standard drinks of alcohol    Types: 28 Cans of beer per week    Comment: 3-4 cans of beer a day        Social History     Substance and Sexual Activity   Drug Use Yes    Frequency: 7.0 times per week    Types: Marijuana    Comment: medical     CRS met with patient at bedside, friend Liliana is present again today. Robert appears to be in better spirits today. He seems alert and not as anxious. He reports that he had some rest. He said he is glad he stayed and will talk with doctors about what's been going on. Though I encouraged some type of treatment for his substance and alcohol, he claims he has been in several facilities both for mental health and substance/alcohol so he is not interested. I explained he has options and he is open to talking about them later.     CRS to follow up with patient during his stay in hospital.                   '          Vidya Wang

## 2024-07-10 NOTE — ASSESSMENT & PLAN NOTE
"CT notes \"Liver cirrhosis with findings of portal hypertension. Numerous hepatic hypodensities may be further evaluated with nonemergent contrast-enhanced hepatic MRI. \"  Continue supportive measures and would plan outpatient study  "

## 2024-07-10 NOTE — CASE MANAGEMENT
Case Management Assessment & Discharge Planning Note    Patient name Robert Combs  Location [facility] 807/[facility] 807-01 MRN [id number]  : 1975 Date 7/10/2024       Current Admission Date: 2024  Current Admission Diagnosis:Alcohol intoxication with delirium (HCC)   Patient Active Problem List    Diagnosis Date Noted Date Diagnosed    Alcohol intoxication with delirium (HCC) 2024     Type 2 diabetes mellitus, without long-term current use of insulin (HCC) 2024     Thrombocytopenia (HCC) 2024     Suicidal behavior with attempted self-injury (HCC) 2024     Ambulatory dysfunction 2024     Abnormal abdominal CT scan 2024     Alcoholic cirrhosis of liver without ascites (HCC) 10/31/2023     Gallstones 10/31/2023     Incarcerated umbilical hernia 10/25/2023     Hepatitis C virus infection without hepatic coma 2019     Chronic bilateral low back pain with bilateral sciatica 2019     Alcohol abuse, daily use 2019     Marijuana use 2019     Depression with anxiety 2019       LOS (days): 1  Geometric Mean LOS (GMLOS) (days):   Days to GMLOS:     OBJECTIVE:    Risk of Unplanned Readmission Score: 20.8         Current admission status: Inpatient       Preferred Pharmacy:   [facility]/pharmacy #[id number] - [place], [place] - [address]  [address]  [place] [address]  Phone: [phone] Fax: [phone]    Primary Care Provider: Yolanda Billy DO    Primary Insurance: [facility]  Secondary Insurance:     ASSESSMENT:  Active Health Care Proxies    There are no active Health Care Proxies on file.                 Readmission Root Cause  30 Day Readmission: No    Patient Information  Admitted from:: Home  Mental Status: Alert  During Assessment patient was accompanied by: Other-Comment (1 to 1)  Assessment information provided by:: Patient  Primary Caregiver: Self  Support Systems: Self, Other (Comment) (ex- girlfriend)  [place] Residence[place] [facility] [place]  Home entry access options. Select all that apply.: Stairs  Number of steps to enter home.: 3  Type of Current Residence: [place]  Living Arrangements: Other (Comment) (lives w/ friend)    Activities of Daily Living Prior to Admission  Functional Status: Independent  Completes ADLs independently?: Yes  Ambulates independently?: Yes  Does patient use assisted devices?: No  Does patient currently own DME?: No  Does patient have a history of Outpatient Therapy (PT/OT)?: No  Does the patient have a history of Short-Term Rehab?: No  Does patient have a history of HHC?: No  Does patient currently have HHC?: No         Patient Information Continued  Income Source: Unemployed  Does patient have prescription coverage?: Yes  Does patient receive dialysis treatments?: No  Does patient have a history of substance abuse?: Yes  Historical substance use preference: Alcohol/ETOH  History of Withdrawal Symptoms: Delirium tremors  Is patient currently in treatment for substance abuse?: No. Patient declined treatment information.  Does patient have a history of Mental Health Diagnosis?: Yes (anxiety and depression)  Is patient receiving treatment for mental health?: Yes  Has patient received inpatient treatment related to mental health in the last 2 years?: Yes (pt could not remember name, about 2 years ago)         Means of Transportation  Means of Transport to Appts:: Friends      Social Determinants of Health (SDOH)      Flowsheet Row Most Recent Value   Housing Stability    In the last 12 months, was there a time when you were not able to pay the mortgage or rent on time? N   In the past 12 months, how many times have you moved where you were living? 1   At any time in the past 12 months, were you homeless or living in a shelter (including now)? N   Transportation Needs    In the past 12 months, has lack of transportation kept you from medical appointments or from getting medications? no   In the past 12  months, has lack of transportation kept you from meetings, work, or from getting things needed for daily living? No   Food Insecurity    Within the past 12 months, you worried that your food would run out before you got the money to buy more. Pt Declined   Within the past 12 months, the food you bought just didn't last and you didn't have money to get more. Pt Declined   Utilities    In the past 12 months has the electric, gas, oil, or water company threatened to shut off services in your home? Pt Declined            DISCHARGE DETAILS:    Discharge planning discussed with:: patient  Freedom of Choice: Yes     CM contacted family/caregiver?: No- see comments  Were Treatment Team discharge recommendations reviewed with patient/caregiver?: Yes  Did patient/caregiver verbalize understanding of patient care needs?: Yes  Were patient/caregiver advised of the risks associated with not following Treatment Team discharge recommendations?: Yes    Contacts  Patient Contacts: Victoria Chiu (Significant Other)  [phone]  Relationship to Patient:: Family  Contact Method: Phone  Phone Number: [phone]  Reason/Outcome: Emergency Contact       Additional Comments: CM met w/ pt at bedside to introduce self and role. Pt lives w/ friend in [facility] w/ 3 NASIR. pt independent for ADLs/ambulation. Pt does not drive and is not currently working. Pt has HX of rehab for alcohol abuse. Pt has depression and anxiety which are managed by a therapist. CM asked pt is he would like HOST referral placed and pt declined. CM provided CM's information for pt incase he changes his mind. CM team to continue to follow pending psych consult.     CM reviewed d/c planning process including the following: identifying help at home, patient preference for d/c planning needs, Discharge Lounge, [facility] Meds to Bed program, availability of treatment team to discuss questions or concerns patient and/or family may have regarding understanding medications and  recognizing signs and symptoms once discharged.  CM also encouraged patient to follow up with all recommended appointments after discharge. Patient advised of importance for patient and family to participate in managing patient’s medical well being.
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
## 2024-07-10 NOTE — ASSESSMENT & PLAN NOTE
Patient has a history of alcohol abuse.   He drinks daily. Last drink was morning of 7/9/24  Continue IVF and other supportive measures
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
## 2024-07-10 NOTE — ASSESSMENT & PLAN NOTE
Lab Results   Component Value Date    HGBA1C 4.9 07/10/2024       Recent Labs     07/10/24  0736 07/10/24  1142 07/10/24  1600   POCGLU 100 144* 99       Blood Sugar Average: Last 72 hrs:  (P) [id number] metformin  Start insulin sliding scale  Monitor

## 2024-07-10 NOTE — SEPSIS NOTE
Sepsis Note   Robert Combs 48 y.o. male MRN: [id number]  Unit/Bed#: [facility] 807-01 Encounter: [id number]       Initial Sepsis Screening       Row Name 07/10/24 0922                Is the patient's history suggestive of a new or worsening infection? No  -DI        Suspected source of infection --                  User Key  (r) = Recorded By, (t) = Taken By, (c) = Cosigned By      Initials Name Provider Type    DI Khanh Jorge Physician                      There is no height or weight on file to calculate BMI.  Wt Readings from Last 1 Encounters:   02/21/24 99.6 kg (219 lb 9.3 oz)        Ideal body weight: 70.7 kg (155 lb 13.8 oz)  Adjusted ideal body weight: 82.3 kg (181 lb 5.6 oz)    Patient meets SIRS criteria for tachycardia and neutropenia.  He is currently admitted for suicidal ideations and alcohol intoxication/withdrawal.  Clinically he feels well and is complaining only of headache at this time.  Denies obvious signs or symptoms of infection.  Suspect that patient's hemodynamic issues are related to autonomic instability in the setting of alcohol intoxication and withdrawal.  Additionally his neutropenia appears more or less chronic and could be related to alcoholic liver disease.  Will continue current therapy and monitor.  Of course we will reassess and have low threshold to cover with antibiotics

## 2024-07-10 NOTE — ASSESSMENT & PLAN NOTE
Underlying history of decompensated liver cirrhosis and thrombocytopenia  Noted elevated T. Bili 3.15 and PT 20.3  Repeat hepatic function studies in the am
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
## 2024-07-10 NOTE — PLAN OF CARE
Problem: PHYSICAL THERAPY ADULT  Goal: Performs mobility at highest level of function for planned discharge setting.  See evaluation for individualized goals.  Description: Treatment/Interventions: Functional transfer training, Gait training, Bed mobility, Spoke to nursing, Spoke to case management, Spoke to MD, OT          See flowsheet documentation for full assessment, interventions and recommendations.  Note: Prognosis: Fair  Problem List: Impaired balance, Decreased mobility, Decreased endurance, Pain, Decreased safety awareness, Decreased cognition, Impaired judgement  Assessment: Pt is a 48 y.o. male seen for PT evaluation s/p admit to [facility] on 7/9/2024. Pt was admitted with a primary dx of: Alcohol intoxication with delirium, Ambulatory dysfunction, Suicidal behavior with attempted self injury, .PMH sx for Alcoholic cirrhosis, Alcohol abuse, Depression with anxiety,DM, Hep C, Marijuana use, Gallstones  PT now consulted for assessment of mobility and d/c needs. Pt with Up and OOB as tolerated  orders.  Pts current clinical presentation is Unstable/ Unpredictable (high complexity) due to Ongoing medical management for primary dx, Decreased activity tolerance compared to baseline, Fall risk, Increased assistance needed from caregiver at current time, Cog status. Prior to admission, pt was Ind for mobility , although reports multiple falls pta.No AD used pta. Upon evaluation, pt currently is requiring min a for all mobiltiy aspects as detailed in above flowsheet. Pt presents at PT eval functioning below baseline and currently w/ overall mobility deficits 2* to: impaired balance, decreased endurance, gait deviations, pain, decreased activity tolerance compared to baseline, decreased functional mobility tolerance compared to baseline, decreased safety awareness, impaired judgement, fall risk, decreased cognition. Pt currently at a fall risk 2* to impairments listed above.  Pt will continue to  benefit from skilled acute PT interventions to address stated impairments; to maximize functional mobility; for ongoing pt/ family training; and DME needs. At conclusion of PT session pt returned BTB, bed alarm engaged, all needs in reach, and RN notified of session findings/recommendations with phone and call bell within reach. Pt denies any further questions at this time. The patient's AM-PAC Basic Mobility Inpatient Short Form Raw Score is 16. A Raw score of less than or equal to 16 suggests the patient may benefit from discharge to post-acute rehabilitation services. Please also refer to the recommendation of the Physical Therapist for safe discharge planning. Recommend Level 2 at this time pending further clinical progress through the hospital stay.        Rehab Resource Intensity Level, PT: II (Moderate Resource Intensity) (pending further progress/clinical progress)    See flowsheet documentation for full assessment.
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
## 2024-07-11 LAB
ALBUMIN SERPL BCG-MCNC: 2.8 G/DL (ref 3.5–5)
ALP SERPL-CCNC: 69 U/L (ref 34–104)
ALT SERPL W P-5'-P-CCNC: 20 U/L (ref 7–52)
ANION GAP SERPL CALCULATED.3IONS-SCNC: 8 MMOL/L (ref 4–13)
AST SERPL W P-5'-P-CCNC: 87 U/L (ref 13–39)
BILIRUB DIRECT SERPL-MCNC: 2.33 MG/DL (ref 0–0.2)
BILIRUB SERPL-MCNC: 5.76 MG/DL (ref 0.2–1)
BUN SERPL-MCNC: 5 MG/DL (ref 5–25)
CALCIUM SERPL-MCNC: 7.7 MG/DL (ref 8.4–10.2)
CHLORIDE SERPL-SCNC: 104 MMOL/L (ref 96–108)
CO2 SERPL-SCNC: 24 MMOL/L (ref 21–32)
CREAT SERPL-MCNC: 0.56 MG/DL (ref 0.6–1.3)
ERYTHROCYTE [DISTWIDTH] IN BLOOD BY AUTOMATED COUNT: 21.8 % (ref 11.6–15.1)
GFR SERPL CREATININE-BSD FRML MDRD: 121 ML/MIN/1.73SQ M
GLUCOSE SERPL-MCNC: 114 MG/DL (ref 65–140)
GLUCOSE SERPL-MCNC: 83 MG/DL (ref 65–140)
GLUCOSE SERPL-MCNC: 89 MG/DL (ref 65–140)
GLUCOSE SERPL-MCNC: 89 MG/DL (ref 65–140)
HCT VFR BLD AUTO: 31 % (ref 36.5–49.3)
HGB BLD-MCNC: 10.2 G/DL (ref 12–17)
INR PPP: 1.99 (ref 0.84–1.19)
MCH RBC QN AUTO: 27.9 PG (ref 26.8–34.3)
MCHC RBC AUTO-ENTMCNC: 32.9 G/DL (ref 31.4–37.4)
MCV RBC AUTO: 85 FL (ref 82–98)
PLATELET # BLD AUTO: 39 THOUSANDS/UL (ref 149–390)
POTASSIUM SERPL-SCNC: 3.6 MMOL/L (ref 3.5–5.3)
PROT SERPL-MCNC: 6.1 G/DL (ref 6.4–8.4)
PROTHROMBIN TIME: 22.3 SECONDS (ref 11.6–14.5)
RBC # BLD AUTO: 3.66 MILLION/UL (ref 3.88–5.62)
SODIUM SERPL-SCNC: 136 MMOL/L (ref 135–147)
WBC # BLD AUTO: 3.35 THOUSAND/UL (ref 4.31–10.16)

## 2024-07-11 PROCEDURE — 87521 HEPATITIS C PROBE&RVRS TRNSC: CPT | Performed by: STUDENT IN AN ORGANIZED HEALTH CARE EDUCATION/TRAINING PROGRAM

## 2024-07-11 PROCEDURE — 86803 HEPATITIS C AB TEST: CPT | Performed by: STUDENT IN AN ORGANIZED HEALTH CARE EDUCATION/TRAINING PROGRAM

## 2024-07-11 PROCEDURE — 85027 COMPLETE CBC AUTOMATED: CPT | Performed by: STUDENT IN AN ORGANIZED HEALTH CARE EDUCATION/TRAINING PROGRAM

## 2024-07-11 PROCEDURE — 82948 REAGENT STRIP/BLOOD GLUCOSE: CPT

## 2024-07-11 PROCEDURE — 87522 HEPATITIS C REVRS TRNSCRPJ: CPT | Performed by: STUDENT IN AN ORGANIZED HEALTH CARE EDUCATION/TRAINING PROGRAM

## 2024-07-11 PROCEDURE — 80076 HEPATIC FUNCTION PANEL: CPT | Performed by: STUDENT IN AN ORGANIZED HEALTH CARE EDUCATION/TRAINING PROGRAM

## 2024-07-11 PROCEDURE — 99223 1ST HOSP IP/OBS HIGH 75: CPT | Performed by: INTERNAL MEDICINE

## 2024-07-11 PROCEDURE — 86705 HEP B CORE ANTIBODY IGM: CPT | Performed by: STUDENT IN AN ORGANIZED HEALTH CARE EDUCATION/TRAINING PROGRAM

## 2024-07-11 PROCEDURE — 85610 PROTHROMBIN TIME: CPT | Performed by: STUDENT IN AN ORGANIZED HEALTH CARE EDUCATION/TRAINING PROGRAM

## 2024-07-11 PROCEDURE — 99232 SBSQ HOSP IP/OBS MODERATE 35: CPT | Performed by: STUDENT IN AN ORGANIZED HEALTH CARE EDUCATION/TRAINING PROGRAM

## 2024-07-11 PROCEDURE — 99232 SBSQ HOSP IP/OBS MODERATE 35: CPT | Performed by: PSYCHIATRY & NEUROLOGY

## 2024-07-11 PROCEDURE — 86704 HEP B CORE ANTIBODY TOTAL: CPT | Performed by: STUDENT IN AN ORGANIZED HEALTH CARE EDUCATION/TRAINING PROGRAM

## 2024-07-11 PROCEDURE — 80048 BASIC METABOLIC PNL TOTAL CA: CPT | Performed by: STUDENT IN AN ORGANIZED HEALTH CARE EDUCATION/TRAINING PROGRAM

## 2024-07-11 PROCEDURE — 87340 HEPATITIS B SURFACE AG IA: CPT | Performed by: STUDENT IN AN ORGANIZED HEALTH CARE EDUCATION/TRAINING PROGRAM

## 2024-07-11 RX ADMIN — SODIUM CHLORIDE, SODIUM GLUCONATE, SODIUM ACETATE, POTASSIUM CHLORIDE, MAGNESIUM CHLORIDE, SODIUM PHOSPHATE, DIBASIC, AND POTASSIUM PHOSPHATE 100 ML/HR: .53; .5; .37; .037; .03; .012; .00082 INJECTION, SOLUTION INTRAVENOUS at 01:22

## 2024-07-11 RX ADMIN — FERROUS SULFATE TAB 325 MG (65 MG ELEMENTAL FE) 325 MG: 325 (65 FE) TAB at 09:04

## 2024-07-11 RX ADMIN — LORAZEPAM 2 MG: 1 TABLET ORAL at 16:50

## 2024-07-11 RX ADMIN — KETOTIFEN FUMARATE 1 DROP: 0.25 SOLUTION/ DROPS OPHTHALMIC at 16:51

## 2024-07-11 RX ADMIN — FOLIC ACID 1 MG: 1 TABLET ORAL at 09:04

## 2024-07-11 RX ADMIN — LORAZEPAM 2 MG: 1 TABLET ORAL at 09:04

## 2024-07-11 RX ADMIN — Medication 1 TABLET: at 09:04

## 2024-07-11 RX ADMIN — SODIUM CHLORIDE, SODIUM GLUCONATE, SODIUM ACETATE, POTASSIUM CHLORIDE, MAGNESIUM CHLORIDE, SODIUM PHOSPHATE, DIBASIC, AND POTASSIUM PHOSPHATE 100 ML/HR: .53; .5; .37; .037; .03; .012; .00082 INJECTION, SOLUTION INTRAVENOUS at 12:32

## 2024-07-11 RX ADMIN — NICOTINE 1 PATCH: 21 PATCH, EXTENDED RELEASE TRANSDERMAL at 09:05

## 2024-07-11 RX ADMIN — THIAMINE HCL TAB 100 MG 100 MG: 100 TAB at 09:04

## 2024-07-11 RX ADMIN — TRAZODONE HYDROCHLORIDE 150 MG: 50 TABLET ORAL at 21:31

## 2024-07-11 RX ADMIN — LORAZEPAM 2 MG: 1 TABLET ORAL at 01:16

## 2024-07-11 NOTE — DISCHARGE INSTR - OTHER ORDERS
Vidya Wang  Certified     [facility] [place] [facility]  [phone] meeting guide   [email or web address]    AA Phone apps:   Meeting Guide  Everything AA  In the [facility] ([place])  [facility]  [address] [place]  [place]  [address]  [phone]    [facility]  [address], 1st floor  [place]  [address]  [phone]    [facility] [place]  [address]  [place]  [address]  [phone]    [facility]   [email or web address]  [phone]

## 2024-07-11 NOTE — ASSESSMENT & PLAN NOTE
Patient has a history of alcohol abuse.   He drinks daily. Last drink this morning, 7/9/24  Presents for symptoms of withdrawal: shakiness, general discomfort  Given Valium and Dilauded in ED  Admit, monitor and manage withdrawal  Start CIWA protocol   Continue Ativan, folic acid, thiamine and multivitamin  IV fluid for hydration  Monitor for worsening symptoms, AMS, seizure  Replace electrolytes

## 2024-07-11 NOTE — ASSESSMENT & PLAN NOTE
Underlying history of decompensated liver cirrhosis and thrombocytopenia  Noted elevated T. Bili 5.76 and PT 22.3  Plt 39  Will touch bases with GI regarding consideration of steroids for alcoholic hepatitis, appreciate GI expertise  Repeat hepatic function studies in the am

## 2024-07-11 NOTE — ASSESSMENT & PLAN NOTE
Chronic thrombocytopenia secondary to alcohol abuse  Plt 39 today, no bleeding noted nor reported  Monitor

## 2024-07-11 NOTE — PROGRESS NOTES
Progress Note - Behavioral Health   Robert Combs 48 y.o. male MRN: [id number]  [facility] Encounter: [id number]      I came to see the patient for continuation of care, he stated that he finished fine but he had sleep difficulties last night due to the noise and lights.  He feels better from the withdrawal point of view.  Patient has limited insight in his alcohol issues, he  stated that he does AA meetings through Zoom, he also claims that he has an sponsor.  I discussed with the patient the impact of alcohol in patient health but he states that he would not drinks, does not want to go to inpatient rehabilitation or any outpatient IOP programs.  He stated that he does not drive and he will continue the AA meetings through Zoom.  He  stated that he did  cut his arm because he wants to feel something, he denies that he had any intention to harm himself. At the moment of the interview he denies any suicidal or homicidal ideation plan or intent , he does not have any psychotic symptoms.  He is complaining that he feels nauseated when he drinks cold water.  He denies any other issues.  He states that he needed the 3 days to be able to detox without any complication at home.          Behavior over the last 24 hours:  improved  Sleep: insomnia  Appetite: poor  Medication side effects: No  ROS: nausea, jaundice, and all other systems are negative    Mental Status Evaluation:  Appearance:  age appropriate, disheveled, and he was jaundiced today   Behavior:  Cooperative   Speech:  soft   Mood:  depressed   Affect:  mood-congruent   Language: naming objects and repeating phrases   Thought Process:  goal directed   Associations: intact associations   Thought Content:  normal   Perceptual Disturbances: None   Risk Potential: Suicidal Ideations none, Homicidal Ideations none, and Potential for Aggression No   Sensorium:  person, place, time/date, and situation   Memory:  recent and remote memory grossly intact    Cognition:  recent and remote memory grossly intact   Consciousness:  alert and awake    Attention: attention span and concentration were age appropriate   Intellect: within normal limits   Fund of Knowledge: awareness of current events: fair, past history: fair, and vocabulary: fair   Insight:  fair   Judgment: fair   Muscle Strength and Tone: Within normal limits   Gait/Station: normal gait/station and normal balance   Motor Activity: Small hand tremors         Assessment/Plan  Robert Combs is a 48 y.o. male with a history of anxiety, depression, alcohol abuse, alcoholic cirrhosis of the liver with ascites, type 2 diabetes mellitus, thrombocytopenia, ambulatory dysfunction, lower back pain, marijuana use, gallstones, presented to the hospital with alcohol intoxication with delirium.symptoms and superficial cuts to the arms, today patient feels better, he denies any active suicidal or homicidal thoughts plan or intent, he does not have any psychotic symptoms or manic episode.   He does have limited insight into his condition, he stated that he will continue doing AA meetings through Zoom.  He does not want to go to inpatient rehabilitation or IOP programs.   Diagnosis:  Depressive disorder unspecified type  Alcohol dependence with withdrawal symptoms  Rule out borderline personality disorder    Recommended Treatment:   Continue medical management  Continue CIWA protocol  Discontinue one-to-one observation  At this time patient does not need inpatient psych admission  We recommend inpatient rehabilitation but patient refused  He will follow-up with his outpatient psychiatrist upon discharge  Patient can be discharged home when medically cleared  Discussed with the primary team  I will sign off but call me back and necessary    Medications: current meds:   Current Facility-Administered Medications   Medication Dose Route Frequency    acetaminophen (TYLENOL) tablet 650 mg  650 mg Oral Q6H PRN    ferrous sulfate  tablet 325 mg  325 mg Oral Daily With Breakfast    folic acid (FOLVITE) tablet 1 mg  1 mg Oral Daily    insulin lispro (HumALOG/ADMELOG) 100 units/mL subcutaneous injection 1-5 Units  1-5 Units Subcutaneous TID AC    Ketotifen Fumarate (ZADITOR) 0.035 % ophthalmic solution 1 drop  1 drop Both Eyes BID    LORazepam (ATIVAN) tablet 2 mg  2 mg Oral Q12H    Followed by    [START ON 7/13/2024] LORazepam (ATIVAN) tablet 1 mg  1 mg Oral Q24H    metoprolol (LOPRESSOR) injection 2.5 mg  2.5 mg Intravenous Q6H PRN    multi-electrolyte (PLASMALYTE-A/ISOLYTE-S PH 7.4) IV solution  100 mL/hr Intravenous Continuous    multivitamin-minerals (CENTRUM) tablet 1 tablet  1 tablet Oral Daily    nicotine (NICODERM CQ) 21 mg/24 hr TD 24 hr patch 1 patch  1 patch Transdermal Daily    ondansetron (ZOFRAN) injection 4 mg  4 mg Intravenous Q6H PRN    oxyCODONE (ROXICODONE) IR tablet 5 mg  5 mg Oral Q4H PRN    thiamine tablet 100 mg  100 mg Oral Daily    traZODone (DESYREL) tablet 150 mg  150 mg Oral HS         Risks, benefits and possible side effects of Medications:     Risks, benefits, and possible side effects of medications explained to patient and patient verbalizes understanding.      Labs: I have personally reviewed all pertinent laboratory results.     I have personally reviewed all pertinent laboratory/tests results.  Labs in last 72 hours:   Recent Labs     07/09/24  1223 07/10/24  0652 07/11/24  0500   WBC 5.24 3.66* 3.35*   RBC 4.44 3.75* 3.66*   HGB 12.3 10.5* 10.2*   HCT 37.1 31.8* 31.0*   PLT 69* 50* 39*   RDW 23.2* 22.5* 21.8*   NEUTROABS 3.33 1.99  --    SODIUM 143 141 136   K 3.2* 3.2* 3.6   * 107 104   CO2 26 26 24   BUN 5 6 5   CREATININE 0.57* 0.53* 0.56*   GLUC 110 100 83   CALCIUM 7.9* 7.3* 7.7*   * 87* 87*   ALT 24 19 20   ALKPHOS 91 87 69   TP 7.5 6.0* 6.1*   ALB 3.4* 2.8* 2.8*   TBILI 3.46* 3.15* 5.76*   [id number] 2.712  --   --        Counseling / Coordination of Care  Total floor / unit time spent  today 40 minutes. Greater than 50% of total time was spent with the patient and / or family counseling and / or coordination of care. A description of the counseling / coordination of care: Talking to the patient    Mary Aranda MD
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
## 2024-07-11 NOTE — ASSESSMENT & PLAN NOTE
Has a history of psychiatric hospitalizations  Continue trazodone  Appreciate psych recommendations, patient does not require inpatient psych at discharge
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
## 2024-07-11 NOTE — CERTIFIED RECOVERY SPECIALIST
Certified  Note    Patient name: Robert Combs  Location: [facility] 807/[facility] 807-01  [place]: [facility]  Attending:  Khanh Jorge MRN [id number]  : 1975  Age: 48 y.o.    Sex: male Date 2024         Substance Use History:     Social History     Substance and Sexual Activity   Alcohol Use Yes    Alcohol/week: 28.0 standard drinks of alcohol    Types: 28 Cans of beer per week    Comment: 3-4 cans of beer a day        Social History     Substance and Sexual Activity   Drug Use Yes    Frequency: 7.0 times per week    Types: Marijuana    Comment: medical     CRS met with patient and he appears better today as well as said he feels better. We discussed his alcohol use. CRS offered options for help to stop drinking and patient wants to just be at home. He continues to use alcohol as a solution to his problems, and states he doesn't want to go back to tx bc his gf cannot drive at night and he has a lot to do at home. He has been in tx too many times. He will accept some resources. CRS provided Recovery [facility] information and other resources.     CRS will follow while in hospital.               Montserrat Wang

## 2024-07-11 NOTE — PROGRESS NOTES
"[facility]  Progress Note  Name: Robert Combs I  MRN: [id number]  Unit/Bed#: [facility] 807-01 I Date of Admission: 7/9/2024   Date of Service: 7/11/2024 I Hospital Day: 2    Assessment & Plan   Abnormal abdominal CT scan  Assessment & Plan  CT notes \"Liver cirrhosis with findings of portal hypertension. Numerous hepatic hypodensities may be further evaluated with nonemergent contrast-enhanced hepatic MRI. \"  Continue supportive measures and would plan outpatient study    Ambulatory dysfunction  Assessment & Plan  With fall secondary to alcohol abuse  Head CT and cervical spine CT scan without acute abnormality  Follow on left hip and left lower extremity x-ray  PT OT eval    Suicidal behavior with attempted self-injury (HCC)  Assessment & Plan  Patient cut his wrists in front of his girlfriend  Has increased depression and SI  Has a history of psychiatric hospitalizations  Continue trazodone  Appreciate psychiatry recommendations    Thrombocytopenia (HCC)  Assessment & Plan  Chronic thrombocytopenia secondary to alcohol abuse  Plt 39 today, no bleeding noted nor reported  Monitor    Type 2 diabetes mellitus, without long-term current use of insulin (HCC)  Assessment & Plan  Lab Results   Component Value Date    HGBA1C 4.9 07/10/2024       Recent Labs     07/10/24  2132 07/11/24  0731 07/11/24  1117 07/11/24  1610   POCGLU 116 89 114 89         Blood Sugar Average: Last 72 hrs:  (P) [id number] metformin  Start insulin sliding scale  Monitor        Alcoholic cirrhosis of liver without ascites (HCC)  Assessment & Plan  Underlying history of decompensated liver cirrhosis and thrombocytopenia  Noted elevated T. Bili 5.76 and PT 22.3  Plt 39  Will touch bases with GI regarding consideration of steroids for alcoholic hepatitis, appreciate GI expertise  Repeat hepatic function studies in the am    Depression with anxiety  Assessment & Plan  Has a history of psychiatric " hospitalizations  Continue trazodone  Appreciate psych recommendations, patient does not require inpatient psych at discharge    Alcohol abuse, daily use  Assessment & Plan  Patient has a history of alcohol abuse.   Continue CIWA  He drinks daily. Last drink was morning of 7/9/24  Continue IVF and other supportive measures    * Alcohol intoxication with delirium (HCC)  Assessment & Plan  Patient has a history of alcohol abuse.   He drinks daily. Last drink this morning, 7/9/24  Presents for symptoms of withdrawal: shakiness, general discomfort  Given Valium and Dilauded in ED  Admit, monitor and manage withdrawal  Start CIWA protocol   Continue Ativan, folic acid, thiamine and multivitamin  IV fluid for hydration  Monitor for worsening symptoms, AMS, seizure  Replace electrolytes           VTE Pharmacologic Prophylaxis: VTE Score: 4 Moderate Risk (Score 3-4) - Pharmacological DVT Prophylaxis Contraindicated. Sequential Compression Devices Ordered.    Mobility:   Basic Mobility Inpatient Raw Score: 18  JH-HLM Goal: 6: Walk 10 steps or more  JH-HLM Achieved: 6: Walk 10 steps or more  JH-HLM Goal achieved. Continue to encourage appropriate mobility.    Patient Centered Rounds: I performed bedside rounds with nursing staff today.   Discussions with Specialists or Other Care Team Provider: GI    Education and Discussions with Family / Patient: Patient declined call to .     Total Time Spent on Date of Encounter in care of patient: 45 mins. This time was spent on one or more of the following: performing physical exam; counseling and coordination of care; obtaining or reviewing history; documenting in the medical record; reviewing/ordering tests, medications or procedures; communicating with other healthcare professionals and discussing with patient's family/caregivers.    Current Length of Stay: 2 day(s)  Current Patient Status: Inpatient   Certification Statement: The patient will continue to require  additional inpatient hospital stay due to Elevated LFTs  Discharge Plan: Anticipate discharge in 24-48 hrs to home.    Code Status: Level 1 - Full Code    Subjective:   Seen and examined at bedside, patient is resting comfortably.  He states he feels better than yesterday.  No other concerns at present.  Denies bleeding    Objective:     Vitals:   Temp (24hrs), Av.7 °F (37.1 °C), Min:98 °F (36.7 °C), Max:100 °F (37.8 °C)    Temp:  [98 °F (36.7 °C)-100 °F (37.8 °C)] 98.7 °F (37.1 °C)  HR:  [] 94  Resp:  [16-18] 16  BP: (123-138)/(60-92) 123/60  SpO2:  [92 %-97 %] 95 %  Body mass index is 32.43 kg/m².     Input and Output Summary (last 24 hours):     Intake/Output Summary (Last 24 hours) at 2024 1657  Last data filed at 2024 1100  Gross per 24 hour   Intake 1413.33 ml   Output --   Net 1413.33 ml       Physical Exam:   Physical Exam  Vitals and nursing note reviewed.   Constitutional:       General: He is not in acute distress.     Appearance: He is well-developed. He is not diaphoretic.   HENT:      Head: Normocephalic and atraumatic.      Mouth/Throat:      Mouth: Mucous membranes are moist.      Comments: Small amount of crusted blood at lips  Eyes:      General: No scleral icterus.     Extraocular Movements: Extraocular movements intact.      Conjunctiva/sclera: Conjunctivae normal.   Cardiovascular:      Rate and Rhythm: Normal rate and regular rhythm.      Pulses: Normal pulses.      Heart sounds: No murmur heard.     No friction rub. No gallop.   Pulmonary:      [place]: Pulmonary effort is normal. No respiratory distress.      Breath sounds: Normal breath sounds. No wheezing, rhonchi or rales.   Abdominal:      General: Abdomen is flat. Bowel sounds are normal. There is no distension.      Palpations: Abdomen is soft. There is no mass.      Tenderness: There is no abdominal tenderness. There is no guarding.   Musculoskeletal:         General: No swelling.      Right lower leg: No edema.       Left lower leg: No edema.   Skin:     General: Skin is warm and dry.      Capillary Refill: Capillary refill takes less than 2 seconds.      Coloration: Skin is jaundiced.      Findings: No rash.   Neurological:      General: No focal deficit present.      Mental Status: He is alert and oriented to person, place, and time.      Sensory: No sensory deficit.      Motor: No weakness.   Psychiatric:         Mood and Affect: Mood normal.          Additional Data:     Labs:  Results from last 7 days   Lab Units 07/11/24  0500 07/10/24  0652   WBC Thousand/uL 3.35* 3.66*   HEMOGLOBIN g/dL 10.2* 10.5*   HEMATOCRIT % 31.0* 31.8*   PLATELETS Thousands/uL 39* 50*   SEGS PCT %  --  54   LYMPHO PCT %  --  31   MONO PCT %  --  11   EOS PCT %  --  3     Results from last 7 days   Lab Units 07/11/24  0500   SODIUM mmol/L 136   POTASSIUM mmol/L 3.6   CHLORIDE mmol/L 104   CO2 mmol/L 24   BUN mg/dL 5   CREATININE mg/dL 0.56*   ANION GAP mmol/L 8   CALCIUM mg/dL 7.7*   ALBUMIN g/dL 2.8*   TOTAL BILIRUBIN mg/dL 5.76*   ALK PHOS U/L 69   ALT U/L 20   AST U/L 87*   GLUCOSE RANDOM mg/dL 83     Results from last 7 days   Lab Units 07/11/24  0500   INR  1.99*     Results from last 7 days   Lab Units 07/11/24  1610 07/11/24  1117 07/11/24  0731 07/10/24  2132 07/10/24  1600 07/10/24  1142 07/10/24  0736   POC GLUCOSE mg/dl 89 114 89 116 99 144* 100     Results from last 7 days   Lab Units 07/10/24  0658   HEMOGLOBIN A1C % 4.9           Lines/Drains:  Invasive Devices       Peripheral Intravenous Line  Duration             Peripheral IV 07/09/24 Right Antecubital 2 days                          Imaging: Reviewed radiology reports from this admission including: abdominal/pelvic CT    Recent Cultures (last 7 days):         Last 24 Hours Medication List:   Current Facility-Administered Medications   Medication Dose Route Frequency Provider Last Rate    acetaminophen  650 mg Oral Q6H PRN Deng Caballero DO      ferrous sulfate  325 mg Oral Daily  With Breakfast Deng Caballero, DO      folic acid  1 mg Oral Daily Deng Caballero,       insulin lispro  1-5 Units Subcutaneous TID AC Deng Caballero,       Ketotifen Fumarate  1 drop Both Eyes BID Deng Caballero, DO      LORazepam  2 mg Oral Q12H Deng Caballero DO      Followed by    [START ON 7/13/2024] LORazepam  1 mg Oral Q24H Deng Caballero, DO      metoprolol  2.5 mg Intravenous Q6H PRN Deng Caballero, DO      multi-electrolyte  75 mL/hr Intravenous Continuous Khanh Jorge 100 mL/hr (07/11/24 1232)    multivitamin-minerals  1 tablet Oral Daily Deng Caballero, DO      nicotine  1 patch Transdermal Daily Deng Caballero, DO      ondansetron  4 mg Intravenous Q6H PRN Deng Caballero, DO      oxyCODONE  5 mg Oral Q4H PRN Deng Caballero, DO      Thiamine Mononitrate  100 mg Oral Daily Deng Caballero, DO      traZODone  150 mg Oral HS Deng Caballero DO          Today, Patient Was Seen By: Khanh Jorge    **Please Note: This note may have been constructed using a voice recognition system.**

## 2024-07-11 NOTE — ASSESSMENT & PLAN NOTE
Patient has a history of alcohol abuse.   Continue CIWA  He drinks daily. Last drink was morning of 7/9/24  Continue IVF and other supportive measures

## 2024-07-11 NOTE — PLAN OF CARE
Problem: PAIN - ADULT  Goal: Verbalizes/displays adequate comfort level or baseline comfort level  Description: Interventions:  - Encourage patient to monitor pain and request assistance  - Assess pain using appropriate pain scale  - Administer analgesics based on type and severity of pain and evaluate response  - Implement non-pharmacological measures as appropriate and evaluate response  - Consider cultural and social influences on pain and pain management  - Notify physician/advanced practitioner if interventions unsuccessful or patient reports new pain  Outcome: Progressing     Problem: INFECTION - ADULT  Goal: Absence or prevention of progression during hospitalization  Description: INTERVENTIONS:  - Assess and monitor for signs and symptoms of infection  - Monitor lab/diagnostic results  - Monitor all insertion sites, i.e. indwelling lines, tubes, and drains  - Monitor endotracheal if appropriate and nasal secretions for changes in amount and color  - [place] appropriate cooling/warming therapies per order  - Administer medications as ordered  - Instruct and encourage patient and family to use good hand hygiene technique  - Identify and instruct in appropriate isolation precautions for identified infection/condition  Outcome: Progressing  Goal: Absence of fever/infection during neutropenic period  Description: INTERVENTIONS:  - Monitor WBC    Outcome: Progressing     Problem: SAFETY ADULT  Goal: Patient will remain free of falls  Description: INTERVENTIONS:  - Educate patient/family on patient safety including physical limitations  - Instruct patient to call for assistance with activity   - Consult OT/PT to assist with strengthening/mobility   - Keep Call bell within reach  - Keep bed low and locked with side rails adjusted as appropriate  - Keep care items and personal belongings within reach  - Initiate and maintain comfort rounds  - Make Fall Risk Sign visible to staff  - Apply yellow socks and bracelet  for high fall risk patients  - Consider moving patient to room near nurses station  Outcome: Progressing  Goal: Maintain or return to baseline ADL function  Description: INTERVENTIONS:  -  Assess patient's ability to carry out ADLs; assess patient's baseline for ADL function and identify physical deficits which impact ability to perform ADLs (bathing, care of mouth/teeth, toileting, grooming, dressing, etc.)  - Assess/evaluate cause of self-care deficits   - Assess range of motion  - Assess patient's mobility; develop plan if impaired  - Assess patient's need for assistive devices and provide as appropriate  - Encourage maximum independence but intervene and supervise when necessary  - Involve family in performance of ADLs  - Assess for home care needs following discharge   - Consider OT consult to assist with ADL evaluation and planning for discharge  - Provide patient education as appropriate  Outcome: Progressing  Goal: Maintains/Returns to pre admission functional level  Description: INTERVENTIONS:  - Perform AM-PAC 6 Click Basic Mobility/ Daily Activity assessment daily.  - Set and communicate daily mobility goal to care team and patient/family/caregiver.   - Collaborate with rehabilitation services on mobility goals if consulted  - Stand patient 3 times a day  - Ambulate patient 3 times a day  - Out of bed to chair 3 times a day   - Out of bed for meals 3 times a day  - Out of bed for toileting  - Record patient progress and toleration of activity level   Outcome: Progressing     Problem: DISCHARGE PLANNING  Goal: Discharge to home or other facility with appropriate resources  Description: INTERVENTIONS:  - Identify barriers to discharge w/patient and caregiver  - Arrange for needed discharge resources and transportation as appropriate  - Identify discharge learning needs (meds, wound care, etc.)  - Arrange for interpretive services to assist at discharge as needed  - Refer to Case Management Department for  coordinating discharge planning if the patient needs post-hospital services based on physician/advanced practitioner order or complex needs related to functional status, cognitive ability, or social support system  Outcome: Progressing     Problem: Knowledge Deficit  Goal: Patient/family/caregiver demonstrates understanding of disease process, treatment plan, medications, and discharge instructions  Description: Complete learning assessment and assess knowledge base.  Interventions:  - Provide teaching at level of understanding  - Provide teaching via preferred learning methods  Outcome: Progressing

## 2024-07-11 NOTE — ASSESSMENT & PLAN NOTE
Lab Results   Component Value Date    HGBA1C 4.9 07/10/2024       Recent Labs     07/10/24  2132 07/11/24  0731 07/11/24  1117 07/11/24  1610   POCGLU 116 89 114 89         Blood Sugar Average: Last 72 hrs:  (P) [id number] metformin  Start insulin sliding scale  Monitor

## 2024-07-12 ENCOUNTER — APPOINTMENT (INPATIENT)
Dept: RADIOLOGY | Facility: HOSPITAL | Age: 49
DRG: 770 | End: 2024-07-12
Payer: COMMERCIAL

## 2024-07-12 VITALS
HEART RATE: 104 BPM | TEMPERATURE: 98.3 F | DIASTOLIC BLOOD PRESSURE: 78 MMHG | SYSTOLIC BLOOD PRESSURE: 145 MMHG | BODY MASS INDEX: 32.52 KG/M2 | WEIGHT: 219.58 LBS | RESPIRATION RATE: 22 BRPM | OXYGEN SATURATION: 95 % | HEIGHT: 69 IN

## 2024-07-12 LAB
ALBUMIN SERPL BCG-MCNC: 3 G/DL (ref 3.5–5)
ALP SERPL-CCNC: 70 U/L (ref 34–104)
ALT SERPL W P-5'-P-CCNC: 21 U/L (ref 7–52)
AMMONIA PLAS-SCNC: 81 UMOL/L (ref 18–72)
ANION GAP SERPL CALCULATED.3IONS-SCNC: 7 MMOL/L (ref 4–13)
AST SERPL W P-5'-P-CCNC: 87 U/L (ref 13–39)
BILIRUB DIRECT SERPL-MCNC: 2.66 MG/DL (ref 0–0.2)
BILIRUB SERPL-MCNC: 6.8 MG/DL (ref 0.2–1)
BUN SERPL-MCNC: 6 MG/DL (ref 5–25)
CALCIUM SERPL-MCNC: 8.3 MG/DL (ref 8.4–10.2)
CHLORIDE SERPL-SCNC: 105 MMOL/L (ref 96–108)
CO2 SERPL-SCNC: 21 MMOL/L (ref 21–32)
CREAT SERPL-MCNC: 0.52 MG/DL (ref 0.6–1.3)
GFR SERPL CREATININE-BSD FRML MDRD: 125 ML/MIN/1.73SQ M
GLUCOSE SERPL-MCNC: 123 MG/DL (ref 65–140)
GLUCOSE SERPL-MCNC: 92 MG/DL (ref 65–140)
GLUCOSE SERPL-MCNC: 93 MG/DL (ref 65–140)
HBV CORE AB SER QL: ABNORMAL
HBV CORE IGM SER QL: ABNORMAL
HBV SURFACE AG SER QL: ABNORMAL
HCV AB SER QL: REACTIVE
HCV RNA SERPL NAA+PROBE-ACNC: 1830 IU/ML
HCV RNA SERPL NAA+PROBE-ACNC: DETECTED K[IU]/ML
INR PPP: 2.05 (ref 0.84–1.19)
POTASSIUM SERPL-SCNC: 3.5 MMOL/L (ref 3.5–5.3)
PROT SERPL-MCNC: 6.5 G/DL (ref 6.4–8.4)
PROTHROMBIN TIME: 22.8 SECONDS (ref 11.6–14.5)
SODIUM SERPL-SCNC: 133 MMOL/L (ref 135–147)

## 2024-07-12 PROCEDURE — NC001 PR NO CHARGE: Performed by: NURSE PRACTITIONER

## 2024-07-12 PROCEDURE — 82948 REAGENT STRIP/BLOOD GLUCOSE: CPT

## 2024-07-12 PROCEDURE — 80076 HEPATIC FUNCTION PANEL: CPT | Performed by: STUDENT IN AN ORGANIZED HEALTH CARE EDUCATION/TRAINING PROGRAM

## 2024-07-12 PROCEDURE — 99239 HOSP IP/OBS DSCHRG MGMT >30: CPT | Performed by: STUDENT IN AN ORGANIZED HEALTH CARE EDUCATION/TRAINING PROGRAM

## 2024-07-12 PROCEDURE — 85610 PROTHROMBIN TIME: CPT | Performed by: STUDENT IN AN ORGANIZED HEALTH CARE EDUCATION/TRAINING PROGRAM

## 2024-07-12 PROCEDURE — 80048 BASIC METABOLIC PNL TOTAL CA: CPT | Performed by: STUDENT IN AN ORGANIZED HEALTH CARE EDUCATION/TRAINING PROGRAM

## 2024-07-12 PROCEDURE — 82140 ASSAY OF AMMONIA: CPT | Performed by: STUDENT IN AN ORGANIZED HEALTH CARE EDUCATION/TRAINING PROGRAM

## 2024-07-12 PROCEDURE — 70450 CT HEAD/BRAIN W/O DYE: CPT

## 2024-07-12 RX ORDER — LACTULOSE 10 G/15ML
20 SOLUTION ORAL 2 TIMES DAILY
Qty: 240 ML | Refills: 0 | Status: SHIPPED | OUTPATIENT
Start: 2024-07-12 | End: 2024-07-25 | Stop reason: SDUPTHER

## 2024-07-12 RX ORDER — THIAMINE MONONITRATE (VIT B1) 100 MG
100 TABLET ORAL DAILY
Qty: 14 TABLET | Refills: 0 | Status: SHIPPED | OUTPATIENT
Start: 2024-07-13

## 2024-07-12 RX ORDER — LACTULOSE 10 G/15ML
20 SOLUTION ORAL 2 TIMES DAILY
Status: DISCONTINUED | OUTPATIENT
Start: 2024-07-12 | End: 2024-07-12 | Stop reason: HOSPADM

## 2024-07-12 RX ORDER — LORAZEPAM 1 MG/1
2 TABLET ORAL ONCE
Status: COMPLETED | OUTPATIENT
Start: 2024-07-12 | End: 2024-07-12

## 2024-07-12 RX ORDER — FOLIC ACID 1 MG/1
1 TABLET ORAL DAILY
Qty: 14 TABLET | Refills: 0 | Status: SHIPPED | OUTPATIENT
Start: 2024-07-13 | End: 2024-07-27

## 2024-07-12 RX ORDER — PANTOPRAZOLE SODIUM 40 MG/1
40 TABLET, DELAYED RELEASE ORAL DAILY
Qty: 30 TABLET | Refills: 0 | Status: SHIPPED | OUTPATIENT
Start: 2024-07-12 | End: 2024-07-25 | Stop reason: SDUPTHER

## 2024-07-12 RX ADMIN — Medication 1 TABLET: at 08:58

## 2024-07-12 RX ADMIN — FOLIC ACID 1 MG: 1 TABLET ORAL at 08:58

## 2024-07-12 RX ADMIN — LORAZEPAM 2 MG: 1 TABLET ORAL at 13:02

## 2024-07-12 RX ADMIN — KETOTIFEN FUMARATE 1 DROP: 0.25 SOLUTION/ DROPS OPHTHALMIC at 08:58

## 2024-07-12 RX ADMIN — FERROUS SULFATE TAB 325 MG (65 MG ELEMENTAL FE) 325 MG: 325 (65 FE) TAB at 08:58

## 2024-07-12 RX ADMIN — NICOTINE 1 PATCH: 21 PATCH, EXTENDED RELEASE TRANSDERMAL at 08:57

## 2024-07-12 RX ADMIN — LACTULOSE 20 G: 20 SOLUTION ORAL at 13:03

## 2024-07-12 RX ADMIN — THIAMINE HCL TAB 100 MG 100 MG: 100 TAB at 08:58

## 2024-07-12 RX ADMIN — LORAZEPAM 2 MG: 1 TABLET ORAL at 04:45

## 2024-07-12 NOTE — ASSESSMENT & PLAN NOTE
Lab Results   Component Value Date    HGBA1C 4.9 07/10/2024       Recent Labs     07/11/24  1117 07/11/24  1610 07/12/24  0714 07/12/24  1123   POCGLU 114 89 93 123         Blood Sugar Average: Last 72 hrs:  (P) 107.0109871178684437Appn metformin  Start insulin sliding scale  Monitor

## 2024-07-12 NOTE — CASE MANAGEMENT
Case Management Progress Note    Patient name Robert Combs  Location [facility] 807/[facility] 807-01 MRN [id number]  : 1975 Date 2024       LOS (days): 3  Geometric Mean LOS (GMLOS) (days):   Days to GMLOS:        OBJECTIVE:        Current admission status: Inpatient  Preferred Pharmacy:   [id number] - [place], [place] - [address]  [address]  [place] [address]  Phone: [phone] Fax: [phone]    Primary Care Provider: Yolanda Billy DO    Primary Insurance: [employer]ANG[employer] CANDELARIO  Secondary Insurance:     PROGRESS NOTE:    CM met w/ pt at bedside to review ride as pt is leaving AMA. Pt confirmed he needs a ride back home. CM placed order for lyft for 2:30. CM team to continue to follow.

## 2024-07-12 NOTE — DISCHARGE SUMMARY
"[facility]  Discharge- Robert Combs 1975, 48 y.o. male MRN: [id number]  Unit/Bed#: [facility] 807-01 Encounter: [id number]  Primary Care Provider: Yolanda Billy DO   Date and time admitted to hospital: 7/9/2024 11:00 AM    Abnormal abdominal CT scan  Assessment & Plan  CT notes \"Liver cirrhosis with findings of portal hypertension. Numerous hepatic hypodensities may be further evaluated with nonemergent contrast-enhanced hepatic MRI. \"  Continue supportive measures and would plan outpatient study    Ambulatory dysfunction  Assessment & Plan  With fall secondary to alcohol abuse  Head CT and cervical spine CT scan without acute abnormality  Follow on left hip and left lower extremity x-ray  PT OT eval    Suicidal behavior with attempted self-injury (HCC)  Assessment & Plan  Patient cut his wrists in front of his girlfriend  Has increased depression and SI  Has a history of psychiatric hospitalizations  Continue trazodone  Appreciate psychiatry recommendations    Thrombocytopenia (HCC)  Assessment & Plan  Chronic thrombocytopenia secondary to alcohol abuse  Plt 39 today, no bleeding noted nor reported  Monitor    Type 2 diabetes mellitus, without long-term current use of insulin (HCC)  Assessment & Plan  Lab Results   Component Value Date    HGBA1C 4.9 07/10/2024       Recent Labs     07/11/24  1117 07/11/24  1610 07/12/24  0714 07/12/24  1123   POCGLU 114 89 93 123         Blood Sugar Average: Last 72 hrs:  (P) [id number] metformin  Start insulin sliding scale  Monitor        Alcoholic cirrhosis of liver without ascites (HCC)  Assessment & Plan  Underlying history of decompensated liver cirrhosis and thrombocytopenia  Noted elevated T. Bili 5.76 and PT 22.3  Plt 39  Will touch bases with GI regarding consideration of steroids for alcoholic hepatitis, appreciate GI expertise  Repeat hepatic function studies in the am    Depression with anxiety  Assessment & " Plan  Has a history of psychiatric hospitalizations  Continue trazodone  Appreciate psych recommendations, patient does not require inpatient psych at discharge    Alcohol abuse, daily use  Assessment & Plan  Patient has a history of alcohol abuse.   Continue CIWA  He drinks daily. Last drink was morning of 7/9/24  Continue IVF and other supportive measures    * Alcohol intoxication with delirium (HCC)  Assessment & Plan  Patient has a history of alcohol abuse.   He drinks daily. Last drink this morning, 7/9/24  Presents for symptoms of withdrawal: shakiness, general discomfort  Given Valium and Dilauded in ED  Admit, monitor and manage withdrawal  Start CIWA protocol   Continue Ativan, folic acid, thiamine and multivitamin  IV fluid for hydration  Monitor for worsening symptoms, AMS, seizure  Replace electrolytes      Medical Problems       Resolved Problems  Date Reviewed: 7/12/2024   None       Discharging Physician / Practitioner: Khanh Jorge  PCP: Yolanda Billy DO  Admission Date:   Admission Orders (From admission, onward)       Ordered        07/09/24 1634  INPATIENT ADMISSION  Once                          Discharge Date: 07/12/24    Consultations During Hospital Stay:  Psychiatry  Gastroenterology    Procedures Performed:   None    Significant Findings / Test Results:   CT head- No acute intracranial abnormality.   CT chest/abdomen/pelvis- 1. No acute findings in the chest, abdomen or pelvis. 2. Liver cirrhosis with findings of portal hypertension. Numerous hepatic hypodensities may be further evaluated with nonemergent contrast-enhanced hepatic MRI.  Ammonia 81  Protime- 22.8  HCV titer- 1830  T. Bili- 6.80    Incidental Findings:   N/A     Test Results Pending at Discharge (will require follow up):   N/A     Outpatient Tests Requested:  Followup with PCP and outpatient therapist    Complications:  Patient left hospital Against Medical Advice    Reason for Admission: Alcohol Intoxication, Suicidal  ideations with self harm, Alcoholic hepatitis    Hospital Course:   Robert Combs is a 48 y.o. male patient who originally presented to the hospital on 7/9/2024 due to Alcohol intoxication and self-harm.  Patient was brought in by his girlfriend after some recent falls and episode of self cutting.  Patient was brought to the ED and found to be intoxicated, he had reported drinking alcohol earlier in the morning with beer.  He was found to have elevated LFTs.  He was admitted and started on CIWA protocol and IV fluids.  Psychiatry was consulted.  Patient states that he did not truly have suicidal ideations but he felt that cutting himself gave him some control over his recently stressful life.  Psychiatry felt patient has depression and may possibly have some component of borderline personality disorder.  During my evaluations, patient also denies suicidal ideations and is forward thinking, stating that he has certain obligations and errands that he needs to complete in the future.  Psychiatry did not feel that patient required inpatient psych hospitalization.  During hospitalization his LFTs increased, likely due to alcoholic hepatitis.  Gastroenterology was consulted and a workup was initiated.  On 7/12/2024, patient had a fall earlier in the morning when he states he slipped off the toilet because the floor was wet.  He landed on his shoulder and otherwise did not sustain injury.  Later on the day he removed his IV and had some bleeding as a result.  He later on told nursing that he did not wish to remain in the hospital and requested to leave AMA.  I visited the patient at bedside.  He was unable to tell me the exact date but can tell me the month, day of the week, and year.  He could answer other orientation questions such as  and the circumstances of his admission to the hospital.  He explained me that he did not wish to remain in the hospital because he did not enjoy the noises and  "he did not get any sleep.  He states he was going to go home and would not take any more alcohol at home.  He does AA over video calls.  I explained my fears that he has increasing LFTs and I think that he is at high risk for decompensation if he were to return home.  He voiced understanding of the risks risks of leaving including progression of liver disease, worsening of symptoms which ultimately could lead to his death.  He remarks that \"I have been dealing with this for over 20 years and I understands the risk.\" His ammonia was mildly elevated, but he does appear to be able to demonstrate decision-making capacity.  I discussed the case with his girlfriend over the phone who also discussed with the patient over the phone, ultimately the patient stated he would not remain in the hospital and left AMA.  He again endorsed that he had no suicidal or homicidal ideations.  He states he has a plan for if he were to get sick again at home and would call 911.  He tells me he is not going home to drink alcohol, however I am concerned that he could indeed relapse.    Please see above list of diagnoses and related plan for additional information.     Condition at Discharge: serious    Discharge Day Visit / Exam:   Subjective:  States he feels well  Vitals: Blood Pressure: 145/78 (07/12/24 0608)  Pulse: 104 (07/12/24 0608)  Temperature: 98.3 °F (36.8 °C) (07/12/24 0608)  Temp Source: Oral (07/12/24 0608)  Respirations: 22 (07/12/24 0608)  Height: 5' 9\" (175.3 cm) (07/11/24 1300)  Weight - Scale: 99.6 kg (219 lb 9.3 oz) (07/11/24 1300)  SpO2: 95 % (07/11/24 1100)  Exam:   Physical Exam  Constitutional:       General: He is not in acute distress.     Appearance: He is ill-appearing.   HENT:      Head: Normocephalic and atraumatic.   Eyes:      General: No scleral icterus.  Cardiovascular:      Rate and Rhythm: Normal rate.      Heart sounds: No murmur heard.  Pulmonary:      Effort: Pulmonary effort is normal.      Breath " sounds: No wheezing or rales.   Abdominal:      General: Abdomen is flat. There is no distension.      Tenderness: There is no abdominal tenderness. There is no guarding.   Skin:     General: Skin is warm.      Coloration: Skin is jaundiced.   Neurological:      General: No focal deficit present.      Mental Status: He is alert and oriented to person, place, and time.      Cranial Nerves: Cranial nerve deficit present.      Comments: Mild tremor   Psychiatric:      Comments: Anxious, no SI          Discussion with Family: Updated  (significant other) via phone.    Discharge instructions/Information to patient and family:   See after visit summary for information provided to patient and family.      Provisions for Follow-Up Care:  See after visit summary for information related to follow-up care and any pertinent home health orders.      Mobility at time of Discharge:   Basic Mobility Inpatient Raw Score: 18  JH-HLM Goal: 6: Walk 10 steps or more  JH-HLM Achieved: 6: Walk 10 steps or more  HLM Goal achieved. Continue to encourage appropriate mobility.     Disposition:   Other: AMA discharge to home    Planned Readmission: No     Discharge Statement:  I spent 45 minutes discharging the patient. This time was spent on the day of discharge. I had direct contact with the patient on the day of discharge. Greater than 50% of the total time was spent examining patient, answering all patient questions, arranging and discussing plan of care with patient as well as directly providing post-discharge instructions.  Additional time then spent on discharge activities.    Discharge Medications:  See after visit summary for reconciled discharge medications provided to patient and/or family.      **Please Note: This note may have been constructed using a voice recognition system**

## 2024-07-12 NOTE — RAPID RESPONSE
Rapid Response Note  Robert Combs 48 y.o. male MRN: [id number]  [facility] Encounter: [id number]    Rapid Response Notification(s):   Response called date/time:  7/12/2024 4:36 AM  Response team arrival date/time:  7/12/2024 4:37 AM  Response end date/time:  7/12/2024 4:43 AM  Level of care:  [place]  Rapid response location:  [place] unit  Primary reason for rapid response call:  Fall    Rapid Response Intervention(s):   Airway:  None  Breathing:  None  Circulation:  None  Fluids administered:  None  Medications administered:  None       Assessment/plan:   Mechanical fall--fall precautions, neuro checks, stat CTH given low plts     Rapid Response Outcome:   Transfer:  Remain on floor  Primary service notified of transfer: Yes (slim np keita at bedside)    Code Status: Level 1 (Full Code)      Family notified: No, patient declined Family notification     Background/Situation:   Robert Combs is a 48 y.o. male who RRT called for fall unwitnessed with no loc. Pt states he was trying to get off toilet but too low and slipped and fell d/t a wet substance on the floor. Pt states hit left shoulder on the wall. Denies head strike. No cervical tenderness.    Review of Systems    Objective:   Vitals:    07/11/24 1522 07/11/24 2204 07/12/24 0222 07/12/24 0453   BP: 123/60 138/98 123/64 162/92   BP Location:       Pulse:   98 (!) 119   Resp: 16 16 22 20   Temp: 98.7 °F (37.1 °C) 98.7 °F (37.1 °C)  98.8 °F (37.1 °C)   TempSrc:    Oral   SpO2:       Weight:       Height:         Physical Exam  Constitutional:       General: He is not in acute distress.  HENT:      Head: Normocephalic and atraumatic.      Mouth/Throat:      Mouth: Oropharynx is clear and moist.   Eyes:      General: No scleral icterus.     Pupils: Pupils are equal, round, and reactive to light.      Comments: R eye petechial eye hemorrhage   Neck:      Vascular: No JVD.   Cardiovascular:      Rate and Rhythm: Normal rate and regular rhythm.      Heart  sounds: Normal heart sounds. No murmur heard.     No friction rub. No gallop.   Pulmonary:      Effort: Pulmonary effort is normal. No respiratory distress.      Breath sounds: Normal breath sounds. No wheezing or rales.   Abdominal:      General: Bowel sounds are normal. There is no distension.      Palpations: Abdomen is soft.      Tenderness: There is no abdominal tenderness.   Musculoskeletal:         General: No edema. Normal range of motion.      Cervical back: Neck supple.   Skin:     General: Skin is warm and dry.      Coloration: Skin is not pale.      Findings: No erythema or rash.      Comments: Old cut marks on arm. tattooes   Neurological:      Mental Status: He is alert and oriented to person, place, and time.      Cranial Nerves: No cranial nerve deficit.

## 2024-07-12 NOTE — PROGRESS NOTES
Pt would not allow me to take vitals but CIWA 8.  2mg ativan given po.  Resident aware and up to see pt.  Pt wanting to leave.  MD and pt's girlfriend trying to convince him to stay.  I will monitor

## 2024-07-12 NOTE — NURSING NOTE
Found on floor in bathroom after RN heard a noise.  Pt pulled iv out again and urinated on bathroom floor.  Pt was able to stand up on his own.  Rapid called. C collar placed on pt. BP  162/92 hr 119 rr 20 temp 98.8.  c/o left shoulder hurting.  Will go down for CT scan.  Iv restarted.

## 2024-07-12 NOTE — DISCHARGE INSTR - AVS FIRST PAGE
Lactulose sent to your pharmacy, please  and take daily  If your symptoms change or worsen, please seek care at and ED  If you develop bleeding or worsening confusion, please seek care at an ED  If you develop thoughts of harming yourself or others, please call 911 or seek care at an ED  It is of paramount importance that you discontinue alcohol intake for the health of your liver

## 2024-07-12 NOTE — QUICK NOTE
"Notified by nursing that patient wishes to leave the hospital AGAINST MEDICAL ADVICE.  I evaluated Robert at bedside who states that he does not wish to remain in the hospital.  He tells me that it is mostly because he is not getting good sleep, and that he cannot stand the sound of his pumps beeping or having an IV in.  He accidentally removed his IV today and started bleeding.  Additionally the patient had a fall off of the toilet this morning when he reports slipping on something wet on the floor.  He denies being confused but just states that he is anxious from being stuck in the hospital at this time.  He is able to tell me the day of the week, month, year, but gets the specific date wrong.  He can answer other basic orientation questions including the current president [place], and the circumstances of his current admission.  He voices understanding of the risks of going home including worsening liver disease that could eventually result in death.  He tells me \"I have been dealing with this for over 20 years and I understands the risk\".  Furthermore he denies suicidal or homicidal ideations.  He appears forward thinking, stating that he has certain obligations and errands he plans to do after discharge home.  He tells me he will not resume drinking alcohol, however I am concerned that this is part of the reason he is leaving the hospital at this time.  He reports that if he does begin to get sicker he would call an ambulance, I relayed my concerns that I feel that Robert is at high risk for decompensation.  He understands this but still wishes to be discharged.  AMA paperwork was filled out and patient was discharged AGAINST MEDICAL ADVICE.  "

## 2024-07-12 NOTE — PROGRESS NOTES
Pt discharged.  D/esteban saline lock with cath intact.  Reviewed d/c summary.  Pt leaving against medical advice

## 2024-07-15 ENCOUNTER — TRANSITIONAL CARE MANAGEMENT (OUTPATIENT)
Dept: FAMILY MEDICINE CLINIC | Facility: CLINIC | Age: 49
End: 2024-07-15

## 2024-07-15 NOTE — UTILIZATION REVIEW
NOTIFICATION OF ADMISSION DISCHARGE   This is a Notification of Discharge from [facility]. Please be advised that this patient has been discharge from our facility. Below you will find the admission and discharge date and time including the patient’s disposition.   UTILIZATION REVIEW CONTACT:  Adarsh Ricketts  Utilization   Network Utilization Review Department  Phone: [phone] x carefully listen to the prompts. All voicemails are confidential.  Email: [email or web address]     ADMISSION INFORMATION  PRESENTATION DATE: 7/9/2024 11:00 AM  OBERVATION ADMISSION DATE: N/A  INPATIENT ADMISSION DATE: 7/9/24  4:34 PM   DISCHARGE DATE: 7/12/2024  2:39 PM   DISPOSITION:Left against medical advice or discontinued care    Network Utilization Review Department  ATTENTION: Please call with any questions or concerns to [phone] and carefully listen to the prompts so that you are directed to the right person. All voicemails are confidential.   For Discharge needs, contact Care Management DC Support Team at [phone] opt. 2  Send all requests for admission clinical reviews, approved or denied determinations and any other requests to dedicated fax number below belonging to the campus where the patient is receiving treatment. List of dedicated fax numbers for the Facilities:  FACILITY NAME UR FAX NUMBER   ADMISSION DENIALS (Administrative/Medical Necessity) [phone]   DISCHARGE SUPPORT TEAM ([facility]) [phone]   PARENT CHILD HEALTH (Maternity/NICU/Pediatrics) [phone]   [facility] [phone]   [facility] [phone]   [facility] [phone]   [facility] [phone]   [facility] [phone]   [facility] [phone]   [facility] [phone]   [facility]  [facility] [phone]   [facility] [phone]   [facility] [phone]   [facility] [phone]   [facility] [phone]

## 2024-07-24 ENCOUNTER — TELEPHONE (OUTPATIENT)
Age: 49
End: 2024-07-24

## 2024-07-24 DIAGNOSIS — K70.30 ALCOHOLIC CIRRHOSIS OF LIVER WITHOUT ASCITES (HCC): ICD-10-CM

## 2024-07-24 RX ORDER — LACTULOSE 10 G/15ML
20 SOLUTION ORAL 2 TIMES DAILY
Qty: 240 ML | Refills: 0 | Status: CANCELLED | OUTPATIENT
Start: 2024-07-24

## 2024-07-24 RX ORDER — PANTOPRAZOLE SODIUM 40 MG/1
40 TABLET, DELAYED RELEASE ORAL DAILY
Qty: 30 TABLET | Refills: 0 | Status: CANCELLED | OUTPATIENT
Start: 2024-07-24

## 2024-07-24 NOTE — TELEPHONE ENCOUNTER
Patient is requesting a one time medication refill of the following medications he received in the ER:    Lactulose (CHRONULAC) 10.g/15mL solution  Take 30 mL (20 g total) by mouth two times a day    Pantoprazole (PROTONIX) 40 mg tablet  Take one tablet by mouth daily.    Medication can be sent to [facility] pharmacy #[id number].    Patient is schedule for an appointment 8/22 at 9:40.    Patient states this medication is working for him.    Please advise and notify patient when sent to pharmacy.

## 2024-07-25 DIAGNOSIS — K70.30 ALCOHOLIC CIRRHOSIS OF LIVER WITHOUT ASCITES (HCC): ICD-10-CM

## 2024-07-25 RX ORDER — PANTOPRAZOLE SODIUM 40 MG/1
40 TABLET, DELAYED RELEASE ORAL DAILY
Qty: 90 TABLET | Refills: 1 | Status: SHIPPED | OUTPATIENT
Start: 2024-07-25

## 2024-07-25 RX ORDER — LACTULOSE 10 G/15ML
20 SOLUTION ORAL 2 TIMES DAILY
Qty: 1800 ML | Refills: 1 | Status: SHIPPED | OUTPATIENT
Start: 2024-07-25

## 2024-07-25 NOTE — TELEPHONE ENCOUNTER
His appointment on 8/22 is scheduled with his PCP; I will send this to her to see if she is comfortable filling.

## 2024-08-18 DIAGNOSIS — K70.30 ALCOHOLIC CIRRHOSIS OF LIVER WITHOUT ASCITES (HCC): ICD-10-CM

## 2024-08-18 RX ORDER — LACTULOSE 10 G/15ML
SOLUTION ORAL
Qty: 1800 ML | Refills: 1 | Status: SHIPPED | OUTPATIENT
Start: 2024-08-18

## 2024-08-20 ENCOUNTER — TELEPHONE (OUTPATIENT)
Age: 49
End: 2024-08-20

## 2024-08-20 DIAGNOSIS — K70.30 ALCOHOLIC CIRRHOSIS OF LIVER WITHOUT ASCITES (HCC): ICD-10-CM

## 2024-08-20 NOTE — TELEPHONE ENCOUNTER
Patient called requesting refill for pantoprazole. Patient made aware medication was refilled on 07.25.2024 for 90 with 1 refills to [facility] pharmacy. Patient instructed to contact the pharmacy to obtain refills of medication. Patient verbalized understanding.      Patient also would like to know what his upcoming visit entitles state he was just in the hospital and they took blood every hour and wanted to know if blood would be drawn at th visit, I advised I am not sure but his visit may be a follow up after being in the hospital.    Patient would like a my chart message or a call back to know what to expect at his upcoming visit on 08.22.2024     Patient also stated his organs is not working well and he still isn't feeling better.    Patient contact number [phone]

## 2024-08-22 PROBLEM — E11.9 TYPE 2 DIABETES MELLITUS, WITHOUT LONG-TERM CURRENT USE OF INSULIN (HCC): Status: RESOLVED | Noted: 2024-07-09 | Resolved: 2024-08-22

## 2024-08-26 ENCOUNTER — OFFICE VISIT (OUTPATIENT)
Dept: URGENT CARE | Facility: CLINIC | Age: 49
End: 2024-08-26
Payer: COMMERCIAL

## 2024-08-26 VITALS
RESPIRATION RATE: 18 BRPM | HEART RATE: 123 BPM | TEMPERATURE: 98.7 F | DIASTOLIC BLOOD PRESSURE: 85 MMHG | SYSTOLIC BLOOD PRESSURE: 146 MMHG | OXYGEN SATURATION: 98 %

## 2024-08-26 DIAGNOSIS — R10.9 ABDOMINAL PAIN, UNSPECIFIED ABDOMINAL LOCATION: Primary | ICD-10-CM

## 2024-08-26 PROCEDURE — 99213 OFFICE O/P EST LOW 20 MIN: CPT

## 2024-08-26 PROCEDURE — S9088 SERVICES PROVIDED IN URGENT: HCPCS

## 2024-08-26 RX ORDER — DIPHENHYDRAMINE HCL 25 MG
25 TABLET ORAL EVERY 6 HOURS PRN
COMMUNITY

## 2024-08-26 RX ORDER — AMLODIPINE BESYLATE 2.5 MG/1
2.5 TABLET ORAL DAILY
COMMUNITY

## 2024-08-26 RX ORDER — IBUPROFEN 200 MG
TABLET ORAL EVERY 6 HOURS PRN
COMMUNITY

## 2024-08-26 RX ORDER — DIPHENOXYLATE HYDROCHLORIDE AND ATROPINE SULFATE 2.5; .025 MG/1; MG/1
1 TABLET ORAL DAILY
COMMUNITY
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
## 2024-09-08 ENCOUNTER — HOSPITAL ENCOUNTER (EMERGENCY)
Facility: HOSPITAL | Age: 49
Discharge: HOME | End: 2024-09-08
Attending: EMERGENCY MEDICINE
Payer: COMMERCIAL

## 2024-09-08 ENCOUNTER — APPOINTMENT (EMERGENCY)
Dept: ULTRASOUND IMAGING | Facility: HOSPITAL | Age: 49
End: 2024-09-08
Payer: COMMERCIAL

## 2024-09-08 VITALS
RESPIRATION RATE: 18 BRPM | TEMPERATURE: 97.7 F | BODY MASS INDEX: 30.89 KG/M2 | WEIGHT: 208.56 LBS | HEART RATE: 98 BPM | HEIGHT: 69 IN | OXYGEN SATURATION: 97 % | DIASTOLIC BLOOD PRESSURE: 75 MMHG | SYSTOLIC BLOOD PRESSURE: 128 MMHG

## 2024-09-08 DIAGNOSIS — N43.3 HYDROCELE IN ADULT: Primary | ICD-10-CM

## 2024-09-08 LAB
ALBUMIN SERPL BCG-MCNC: 2.8 G/DL (ref 3.5–5)
ALP SERPL-CCNC: 93 U/L (ref 34–104)
ALT SERPL W P-5'-P-CCNC: 20 U/L (ref 7–52)
ANION GAP SERPL CALCULATED.3IONS-SCNC: 5 MMOL/L (ref 4–13)
AST SERPL W P-5'-P-CCNC: 68 U/L (ref 13–39)
BASOPHILS # BLD AUTO: 0.03 THOUSANDS/ÂΜL (ref 0–0.1)
BASOPHILS NFR BLD AUTO: 1 % (ref 0–1)
BILIRUB SERPL-MCNC: 6.12 MG/DL (ref 0.2–1)
BILIRUB UR QL STRIP: ABNORMAL
BUN SERPL-MCNC: 4 MG/DL (ref 5–25)
CALCIUM ALBUM COR SERPL-MCNC: 8.7 MG/DL (ref 8.3–10.1)
CALCIUM SERPL-MCNC: 7.7 MG/DL (ref 8.4–10.2)
CHLORIDE SERPL-SCNC: 106 MMOL/L (ref 96–108)
CLARITY UR: CLEAR
CO2 SERPL-SCNC: 25 MMOL/L (ref 21–32)
COLOR UR: ABNORMAL
CREAT SERPL-MCNC: 0.61 MG/DL (ref 0.6–1.3)
EOSINOPHIL # BLD AUTO: 0.12 THOUSAND/ÂΜL (ref 0–0.61)
EOSINOPHIL NFR BLD AUTO: 3 % (ref 0–6)
ERYTHROCYTE [DISTWIDTH] IN BLOOD BY AUTOMATED COUNT: 20.4 % (ref 11.6–15.1)
ETHANOL SERPL-MCNC: 169 MG/DL
GFR SERPL CREATININE-BSD FRML MDRD: 116 ML/MIN/1.73SQ M
GLUCOSE SERPL-MCNC: 94 MG/DL (ref 65–140)
GLUCOSE UR STRIP-MCNC: NEGATIVE MG/DL
HCT VFR BLD AUTO: 35 % (ref 36.5–49.3)
HGB BLD-MCNC: 11.9 G/DL (ref 12–17)
HGB UR QL STRIP.AUTO: NEGATIVE
IMM GRANULOCYTES # BLD AUTO: 0.01 THOUSAND/UL (ref 0–0.2)
IMM GRANULOCYTES NFR BLD AUTO: 0 % (ref 0–2)
KETONES UR STRIP-MCNC: NEGATIVE MG/DL
LEUKOCYTE ESTERASE UR QL STRIP: NEGATIVE
LYMPHOCYTES # BLD AUTO: 0.9 THOUSANDS/ÂΜL (ref 0.6–4.47)
LYMPHOCYTES NFR BLD AUTO: 20 % (ref 14–44)
MAGNESIUM SERPL-MCNC: 1.8 MG/DL (ref 1.9–2.7)
MCH RBC QN AUTO: 29.8 PG (ref 26.8–34.3)
MCHC RBC AUTO-ENTMCNC: 34 G/DL (ref 31.4–37.4)
MCV RBC AUTO: 88 FL (ref 82–98)
MONOCYTES # BLD AUTO: 0.7 THOUSAND/ÂΜL (ref 0.17–1.22)
MONOCYTES NFR BLD AUTO: 16 % (ref 4–12)
NEUTROPHILS # BLD AUTO: 2.7 THOUSANDS/ÂΜL (ref 1.85–7.62)
NEUTS SEG NFR BLD AUTO: 60 % (ref 43–75)
NITRITE UR QL STRIP: NEGATIVE
NRBC BLD AUTO-RTO: 0 /100 WBCS
PH UR STRIP.AUTO: 6.5 [PH]
PLATELET # BLD AUTO: 82 THOUSANDS/UL (ref 149–390)
PMV BLD AUTO: 10.6 FL (ref 8.9–12.7)
POTASSIUM SERPL-SCNC: 3 MMOL/L (ref 3.5–5.3)
PROT SERPL-MCNC: 6.5 G/DL (ref 6.4–8.4)
PROT UR STRIP-MCNC: NEGATIVE MG/DL
RBC # BLD AUTO: 4 MILLION/UL (ref 3.88–5.62)
SODIUM SERPL-SCNC: 136 MMOL/L (ref 135–147)
SP GR UR STRIP.AUTO: 1.02 (ref 1–1.03)
UROBILINOGEN UR STRIP-ACNC: 3 MG/DL
WBC # BLD AUTO: 4.46 THOUSAND/UL (ref 4.31–10.16)

## 2024-09-08 PROCEDURE — 99284 EMERGENCY DEPT VISIT MOD MDM: CPT

## 2024-09-08 PROCEDURE — 82077 ASSAY SPEC XCP UR&BREATH IA: CPT | Performed by: EMERGENCY MEDICINE

## 2024-09-08 PROCEDURE — 76870 US EXAM SCROTUM: CPT

## 2024-09-08 PROCEDURE — 96361 HYDRATE IV INFUSION ADD-ON: CPT

## 2024-09-08 PROCEDURE — 96374 THER/PROPH/DIAG INJ IV PUSH: CPT

## 2024-09-08 PROCEDURE — 81003 URINALYSIS AUTO W/O SCOPE: CPT | Performed by: EMERGENCY MEDICINE

## 2024-09-08 PROCEDURE — 36415 COLL VENOUS BLD VENIPUNCTURE: CPT | Performed by: EMERGENCY MEDICINE

## 2024-09-08 PROCEDURE — 85025 COMPLETE CBC W/AUTO DIFF WBC: CPT | Performed by: EMERGENCY MEDICINE

## 2024-09-08 PROCEDURE — 83735 ASSAY OF MAGNESIUM: CPT | Performed by: EMERGENCY MEDICINE

## 2024-09-08 PROCEDURE — 80053 COMPREHEN METABOLIC PANEL: CPT | Performed by: EMERGENCY MEDICINE

## 2024-09-08 PROCEDURE — 99284 EMERGENCY DEPT VISIT MOD MDM: CPT | Performed by: EMERGENCY MEDICINE

## 2024-09-08 RX ORDER — FOLIC ACID 1 MG/1
1 TABLET ORAL DAILY
Status: DISCONTINUED | OUTPATIENT
Start: 2024-09-08 | End: 2024-09-08 | Stop reason: HOSPADM

## 2024-09-08 RX ORDER — KETOROLAC TROMETHAMINE 30 MG/ML
15 INJECTION, SOLUTION INTRAMUSCULAR; INTRAVENOUS ONCE
Status: COMPLETED | OUTPATIENT
Start: 2024-09-08 | End: 2024-09-08

## 2024-09-08 RX ORDER — POTASSIUM CHLORIDE 1500 MG/1
40 TABLET, EXTENDED RELEASE ORAL ONCE
Status: COMPLETED | OUTPATIENT
Start: 2024-09-08 | End: 2024-09-08

## 2024-09-08 RX ORDER — LANOLIN ALCOHOL/MO/W.PET/CERES
100 CREAM (GRAM) TOPICAL DAILY
Status: DISCONTINUED | OUTPATIENT
Start: 2024-09-08 | End: 2024-09-08 | Stop reason: HOSPADM

## 2024-09-08 RX ORDER — CLONAZEPAM 0.5 MG/1
0.5 TABLET ORAL ONCE
Status: COMPLETED | OUTPATIENT
Start: 2024-09-08 | End: 2024-09-08

## 2024-09-08 RX ORDER — LANOLIN ALCOHOL/MO/W.PET/CERES
400 CREAM (GRAM) TOPICAL ONCE
Status: COMPLETED | OUTPATIENT
Start: 2024-09-08 | End: 2024-09-08

## 2024-09-08 RX ADMIN — FOLIC ACID 1 MG: 1 TABLET ORAL at 11:33

## 2024-09-08 RX ADMIN — THIAMINE HCL TAB 100 MG 100 MG: 100 TAB at 11:33

## 2024-09-08 RX ADMIN — Medication 1 TABLET: at 11:33

## 2024-09-08 RX ADMIN — CLONAZEPAM 0.5 MG: 0.5 TABLET ORAL at 12:14

## 2024-09-08 RX ADMIN — KETOROLAC TROMETHAMINE 15 MG: 30 INJECTION, SOLUTION INTRAMUSCULAR at 10:55

## 2024-09-08 RX ADMIN — Medication 400 MG: at 13:29

## 2024-09-08 RX ADMIN — SODIUM CHLORIDE 1000 ML: 0.9 INJECTION, SOLUTION INTRAVENOUS at 10:55

## 2024-09-08 RX ADMIN — POTASSIUM CHLORIDE 40 MEQ: 1500 TABLET, EXTENDED RELEASE ORAL at 13:29
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
## 2024-09-08 NOTE — ED NOTES
Pt provided urinal at this time; pt did not want to walk to bathroom     Mayte Lema RN  09/08/24 0452

## 2024-09-08 NOTE — ED NOTES
Pt verbalize understanding of discharge instructions as given by treating provider; ambulated out of the ED without assistance with mother; uneventful ED visit     Mayte Lema RN  09/08/24 3312

## 2024-09-09 ENCOUNTER — TELEPHONE (OUTPATIENT)
Age: 49
End: 2024-09-09

## 2024-09-09 ENCOUNTER — OFFICE VISIT (OUTPATIENT)
Dept: UROLOGY | Facility: CLINIC | Age: 49
End: 2024-09-09
Payer: COMMERCIAL

## 2024-09-09 VITALS
TEMPERATURE: 97.5 F | HEIGHT: 69 IN | SYSTOLIC BLOOD PRESSURE: 118 MMHG | HEART RATE: 112 BPM | BODY MASS INDEX: 30.96 KG/M2 | OXYGEN SATURATION: 99 % | DIASTOLIC BLOOD PRESSURE: 78 MMHG | WEIGHT: 209 LBS

## 2024-09-09 DIAGNOSIS — N43.3 HYDROCELE, UNSPECIFIED HYDROCELE TYPE: Primary | ICD-10-CM

## 2024-09-09 PROCEDURE — 99203 OFFICE O/P NEW LOW 30 MIN: CPT | Performed by: UROLOGY

## 2024-09-09 NOTE — TELEPHONE ENCOUNTER
Sandie from [employer] [employer] called stating patient is having his first visit with the office. She wants to know if patient will be having a procedure done today.  She needs to know the procedure code for today;s visit just incase patient has a procedure today that might require prior authorization.     Please call her at [phone]

## 2024-09-13 ENCOUNTER — VBI (OUTPATIENT)
Dept: ADMINISTRATIVE | Facility: OTHER | Age: 49
End: 2024-09-13

## 2024-09-13 NOTE — TELEPHONE ENCOUNTER
09/13/24 9:08 AM     Chart reviewed for Diabetic Eye Exam was/were not submitted to the patient's insurance.     Marleny Masterson MA   [facility]

## 2024-09-16 DIAGNOSIS — K70.30 ALCOHOLIC CIRRHOSIS OF LIVER WITHOUT ASCITES (HCC): ICD-10-CM

## 2024-09-18 RX ORDER — LACTULOSE 10 G/15ML
SOLUTION ORAL
Qty: 5400 ML | Refills: 1 | Status: SHIPPED | OUTPATIENT
Start: 2024-09-18

## 2024-09-20 DIAGNOSIS — N43.3 HYDROCELE, UNSPECIFIED HYDROCELE TYPE: ICD-10-CM

## 2024-09-20 NOTE — TELEPHONE ENCOUNTER
Patient was suppose to be on this medication until his next appointment. His next appointment is 10/1/24 with urology. I'm not sure if refills are appropriate. Patient said he is still having issues.    Reason for call:   [x] Refill   [] Prior Auth  [] Other:     Office:   [] PCP/Provider -   [x] Specialty/Provider - Uro    Medication: diclofenac sodium (VOLTAREN) 50 mg EC tablet     Dose/Frequency: Take 1 tablet (50 mg total) by mouth 2 (two) times a day for 14 days     Quantity: 28    Pharmacy: [facility]/pharmacy #[id number] - [place], [place] - [address]      Does the patient have enough for 3 days?   [] Yes   [x] No - Send as HP to POD

## 2024-10-24 NOTE — TELEPHONE ENCOUNTER
[facility] pharmacy called to have patients medications sent to them. Pharmacy did not have a list of what medications the patient was requesting. Informed pharmacy that a list of medications would be needed. Pharmacy stated they would contact the patient and have the patient call for the refills he needs

## 2024-11-01 ENCOUNTER — HOSPITAL ENCOUNTER (OUTPATIENT)
Facility: HOSPITAL | Age: 49
Setting detail: OBSERVATION
Discharge: LEFT BEFORE DISCHARGE | End: 2024-11-02
Attending: EMERGENCY MEDICINE | Admitting: STUDENT IN AN ORGANIZED HEALTH CARE EDUCATION/TRAINING PROGRAM
Payer: COMMERCIAL

## 2024-11-01 ENCOUNTER — APPOINTMENT (EMERGENCY)
Dept: CT IMAGING | Facility: HOSPITAL | Age: 49
End: 2024-11-01
Payer: COMMERCIAL

## 2024-11-01 DIAGNOSIS — R18.8 ASCITES: Primary | ICD-10-CM

## 2024-11-01 DIAGNOSIS — K70.30 ALCOHOLIC CIRRHOSIS OF LIVER WITHOUT ASCITES (HCC): ICD-10-CM

## 2024-11-01 DIAGNOSIS — T14.91XA SUICIDAL BEHAVIOR WITH ATTEMPTED SELF-INJURY (HCC): ICD-10-CM

## 2024-11-01 DIAGNOSIS — B19.20 HEPATITIS C VIRUS INFECTION WITHOUT HEPATIC COMA, UNSPECIFIED CHRONICITY: ICD-10-CM

## 2024-11-01 DIAGNOSIS — F41.8 DEPRESSION WITH ANXIETY: ICD-10-CM

## 2024-11-01 DIAGNOSIS — K52.9 ENTERITIS: ICD-10-CM

## 2024-11-01 DIAGNOSIS — K29.20 ACUTE ALCOHOLIC GASTRITIS WITHOUT HEMORRHAGE: ICD-10-CM

## 2024-11-01 PROBLEM — D61.818 PANCYTOPENIA (HCC): Status: ACTIVE | Noted: 2024-11-01

## 2024-11-01 LAB
2HR DELTA HS TROPONIN: 1 NG/L
4HR DELTA HS TROPONIN: 2 NG/L
ALBUMIN SERPL BCG-MCNC: 2.8 G/DL (ref 3.5–5)
ALP SERPL-CCNC: 77 U/L (ref 34–104)
ALT SERPL W P-5'-P-CCNC: 19 U/L (ref 7–52)
AMMONIA PLAS-SCNC: 53 UMOL/L (ref 18–72)
ANION GAP SERPL CALCULATED.3IONS-SCNC: 7 MMOL/L (ref 4–13)
APTT PPP: 40 SECONDS (ref 23–34)
AST SERPL W P-5'-P-CCNC: 71 U/L (ref 13–39)
ATRIAL RATE: 97 BPM
BASOPHILS # BLD AUTO: 0.04 THOUSANDS/ΜL (ref 0–0.1)
BASOPHILS NFR BLD AUTO: 1 % (ref 0–1)
BILIRUB SERPL-MCNC: 6.33 MG/DL (ref 0.2–1)
BNP SERPL-MCNC: 30 PG/ML (ref 0–100)
BUN SERPL-MCNC: 6 MG/DL (ref 5–25)
CALCIUM ALBUM COR SERPL-MCNC: 8.8 MG/DL (ref 8.3–10.1)
CALCIUM SERPL-MCNC: 7.8 MG/DL (ref 8.4–10.2)
CARDIAC TROPONIN I PNL SERPL HS: 6 NG/L
CARDIAC TROPONIN I PNL SERPL HS: 7 NG/L
CARDIAC TROPONIN I PNL SERPL HS: 8 NG/L
CHLORIDE SERPL-SCNC: 106 MMOL/L (ref 96–108)
CO2 SERPL-SCNC: 23 MMOL/L (ref 21–32)
CREAT SERPL-MCNC: 0.54 MG/DL (ref 0.6–1.3)
D DIMER PPP FEU-MCNC: 15.21 UG/ML FEU
EOSINOPHIL # BLD AUTO: 0.12 THOUSAND/ΜL (ref 0–0.61)
EOSINOPHIL NFR BLD AUTO: 3 % (ref 0–6)
ERYTHROCYTE [DISTWIDTH] IN BLOOD BY AUTOMATED COUNT: 16.2 % (ref 11.6–15.1)
ETHANOL SERPL-MCNC: 163 MG/DL
GFR SERPL CREATININE-BSD FRML MDRD: 122 ML/MIN/1.73SQ M
GLUCOSE SERPL-MCNC: 107 MG/DL (ref 65–140)
HCT VFR BLD AUTO: 34.4 % (ref 36.5–49.3)
HGB BLD-MCNC: 11.6 G/DL (ref 12–17)
IMM GRANULOCYTES # BLD AUTO: 0.02 THOUSAND/UL (ref 0–0.2)
IMM GRANULOCYTES NFR BLD AUTO: 1 % (ref 0–2)
INR PPP: 2.03 (ref 0.85–1.19)
LIPASE SERPL-CCNC: 81 U/L (ref 11–82)
LYMPHOCYTES # BLD AUTO: 0.91 THOUSANDS/ΜL (ref 0.6–4.47)
LYMPHOCYTES NFR BLD AUTO: 22 % (ref 14–44)
MCH RBC QN AUTO: 31.3 PG (ref 26.8–34.3)
MCHC RBC AUTO-ENTMCNC: 33.7 G/DL (ref 31.4–37.4)
MCV RBC AUTO: 93 FL (ref 82–98)
MONOCYTES # BLD AUTO: 0.61 THOUSAND/ΜL (ref 0.17–1.22)
MONOCYTES NFR BLD AUTO: 14 % (ref 4–12)
NEUTROPHILS # BLD AUTO: 2.53 THOUSANDS/ΜL (ref 1.85–7.62)
NEUTS SEG NFR BLD AUTO: 59 % (ref 43–75)
NRBC BLD AUTO-RTO: 0 /100 WBCS
P AXIS: 71 DEGREES
PLATELET # BLD AUTO: 64 THOUSANDS/UL (ref 149–390)
PMV BLD AUTO: 9.8 FL (ref 8.9–12.7)
POTASSIUM SERPL-SCNC: 3.3 MMOL/L (ref 3.5–5.3)
PR INTERVAL: 176 MS
PROT SERPL-MCNC: 6.5 G/DL (ref 6.4–8.4)
PROTHROMBIN TIME: 23.7 SECONDS (ref 12.3–15)
QRS AXIS: 102 DEGREES
QRSD INTERVAL: 104 MS
QT INTERVAL: 410 MS
QTC INTERVAL: 520 MS
RBC # BLD AUTO: 3.71 MILLION/UL (ref 3.88–5.62)
SODIUM SERPL-SCNC: 136 MMOL/L (ref 135–147)
T WAVE AXIS: 52 DEGREES
VENTRICULAR RATE: 97 BPM
WBC # BLD AUTO: 4.23 THOUSAND/UL (ref 4.31–10.16)

## 2024-11-01 PROCEDURE — 96366 THER/PROPH/DIAG IV INF ADDON: CPT

## 2024-11-01 PROCEDURE — 82140 ASSAY OF AMMONIA: CPT | Performed by: PHYSICIAN ASSISTANT

## 2024-11-01 PROCEDURE — 85610 PROTHROMBIN TIME: CPT | Performed by: EMERGENCY MEDICINE

## 2024-11-01 PROCEDURE — 74174 CTA ABD&PLVS W/CONTRAST: CPT

## 2024-11-01 PROCEDURE — 36415 COLL VENOUS BLD VENIPUNCTURE: CPT | Performed by: EMERGENCY MEDICINE

## 2024-11-01 PROCEDURE — 99285 EMERGENCY DEPT VISIT HI MDM: CPT | Performed by: EMERGENCY MEDICINE

## 2024-11-01 PROCEDURE — 96376 TX/PRO/DX INJ SAME DRUG ADON: CPT

## 2024-11-01 PROCEDURE — 99223 1ST HOSP IP/OBS HIGH 75: CPT | Performed by: PHYSICIAN ASSISTANT

## 2024-11-01 PROCEDURE — 85730 THROMBOPLASTIN TIME PARTIAL: CPT | Performed by: EMERGENCY MEDICINE

## 2024-11-01 PROCEDURE — 71275 CT ANGIOGRAPHY CHEST: CPT

## 2024-11-01 PROCEDURE — 83880 ASSAY OF NATRIURETIC PEPTIDE: CPT | Performed by: EMERGENCY MEDICINE

## 2024-11-01 PROCEDURE — 96365 THER/PROPH/DIAG IV INF INIT: CPT

## 2024-11-01 PROCEDURE — 85025 COMPLETE CBC W/AUTO DIFF WBC: CPT | Performed by: EMERGENCY MEDICINE

## 2024-11-01 PROCEDURE — 96375 TX/PRO/DX INJ NEW DRUG ADDON: CPT

## 2024-11-01 PROCEDURE — 80053 COMPREHEN METABOLIC PANEL: CPT | Performed by: EMERGENCY MEDICINE

## 2024-11-01 PROCEDURE — 93010 ELECTROCARDIOGRAM REPORT: CPT | Performed by: STUDENT IN AN ORGANIZED HEALTH CARE EDUCATION/TRAINING PROGRAM

## 2024-11-01 PROCEDURE — 82077 ASSAY SPEC XCP UR&BREATH IA: CPT | Performed by: PHYSICIAN ASSISTANT

## 2024-11-01 PROCEDURE — 83690 ASSAY OF LIPASE: CPT | Performed by: EMERGENCY MEDICINE

## 2024-11-01 PROCEDURE — 84484 ASSAY OF TROPONIN QUANT: CPT | Performed by: EMERGENCY MEDICINE

## 2024-11-01 PROCEDURE — 99284 EMERGENCY DEPT VISIT MOD MDM: CPT

## 2024-11-01 PROCEDURE — 85379 FIBRIN DEGRADATION QUANT: CPT | Performed by: EMERGENCY MEDICINE

## 2024-11-01 PROCEDURE — 93005 ELECTROCARDIOGRAM TRACING: CPT

## 2024-11-01 RX ORDER — MORPHINE SULFATE 4 MG/ML
4 INJECTION, SOLUTION INTRAMUSCULAR; INTRAVENOUS ONCE
Status: COMPLETED | OUTPATIENT
Start: 2024-11-01 | End: 2024-11-01

## 2024-11-01 RX ORDER — NICOTINE 21 MG/24HR
1 PATCH, TRANSDERMAL 24 HOURS TRANSDERMAL DAILY
Status: DISCONTINUED | OUTPATIENT
Start: 2024-11-02 | End: 2024-11-02 | Stop reason: HOSPADM

## 2024-11-01 RX ORDER — ONDANSETRON 2 MG/ML
4 INJECTION INTRAMUSCULAR; INTRAVENOUS ONCE
Status: COMPLETED | OUTPATIENT
Start: 2024-11-01 | End: 2024-11-01

## 2024-11-01 RX ORDER — MAGNESIUM HYDROXIDE/ALUMINUM HYDROXICE/SIMETHICONE 120; 1200; 1200 MG/30ML; MG/30ML; MG/30ML
30 SUSPENSION ORAL EVERY 6 HOURS PRN
Status: DISCONTINUED | OUTPATIENT
Start: 2024-11-01 | End: 2024-11-02 | Stop reason: HOSPADM

## 2024-11-01 RX ORDER — MAGNESIUM SULFATE HEPTAHYDRATE 40 MG/ML
2 INJECTION, SOLUTION INTRAVENOUS ONCE
Status: COMPLETED | OUTPATIENT
Start: 2024-11-01 | End: 2024-11-01

## 2024-11-01 RX ORDER — LANOLIN ALCOHOL/MO/W.PET/CERES
6 CREAM (GRAM) TOPICAL
Status: DISCONTINUED | OUTPATIENT
Start: 2024-11-01 | End: 2024-11-02 | Stop reason: HOSPADM

## 2024-11-01 RX ORDER — LANOLIN ALCOHOL/MO/W.PET/CERES
100 CREAM (GRAM) TOPICAL DAILY
Status: DISCONTINUED | OUTPATIENT
Start: 2024-11-02 | End: 2024-11-02 | Stop reason: HOSPADM

## 2024-11-01 RX ORDER — PANTOPRAZOLE SODIUM 40 MG/10ML
40 INJECTION, POWDER, LYOPHILIZED, FOR SOLUTION INTRAVENOUS ONCE
Status: COMPLETED | OUTPATIENT
Start: 2024-11-01 | End: 2024-11-01

## 2024-11-01 RX ORDER — ACETAMINOPHEN 325 MG/1
325 TABLET ORAL EVERY 6 HOURS PRN
Status: DISCONTINUED | OUTPATIENT
Start: 2024-11-01 | End: 2024-11-02 | Stop reason: HOSPADM

## 2024-11-01 RX ORDER — CLONAZEPAM 0.5 MG/1
0.5 TABLET ORAL 3 TIMES DAILY PRN
Status: DISCONTINUED | OUTPATIENT
Start: 2024-11-01 | End: 2024-11-02 | Stop reason: HOSPADM

## 2024-11-01 RX ORDER — LACTULOSE 10 G/15ML
30 SOLUTION ORAL DAILY
Status: DISCONTINUED | OUTPATIENT
Start: 2024-11-02 | End: 2024-11-02 | Stop reason: HOSPADM

## 2024-11-01 RX ORDER — AMLODIPINE BESYLATE 2.5 MG/1
2.5 TABLET ORAL DAILY
Status: DISCONTINUED | OUTPATIENT
Start: 2024-11-02 | End: 2024-11-02 | Stop reason: HOSPADM

## 2024-11-01 RX ORDER — POLYETHYLENE GLYCOL 3350 17 G/17G
17 POWDER, FOR SOLUTION ORAL DAILY PRN
Status: DISCONTINUED | OUTPATIENT
Start: 2024-11-01 | End: 2024-11-02 | Stop reason: HOSPADM

## 2024-11-01 RX ORDER — FOLIC ACID 1 MG/1
1 TABLET ORAL DAILY
Status: DISCONTINUED | OUTPATIENT
Start: 2024-11-02 | End: 2024-11-02 | Stop reason: HOSPADM

## 2024-11-01 RX ORDER — PANTOPRAZOLE SODIUM 40 MG/1
40 TABLET, DELAYED RELEASE ORAL
Status: DISCONTINUED | OUTPATIENT
Start: 2024-11-02 | End: 2024-11-02 | Stop reason: HOSPADM

## 2024-11-01 RX ORDER — FAMOTIDINE 10 MG/ML
20 INJECTION, SOLUTION INTRAVENOUS ONCE
Status: COMPLETED | OUTPATIENT
Start: 2024-11-01 | End: 2024-11-01

## 2024-11-01 RX ORDER — ONDANSETRON 2 MG/ML
4 INJECTION INTRAMUSCULAR; INTRAVENOUS EVERY 6 HOURS PRN
Status: DISCONTINUED | OUTPATIENT
Start: 2024-11-01 | End: 2024-11-02 | Stop reason: HOSPADM

## 2024-11-01 RX ADMIN — IOHEXOL 100 ML: 350 INJECTION, SOLUTION INTRAVENOUS at 18:21

## 2024-11-01 RX ADMIN — MAGNESIUM SULFATE HEPTAHYDRATE 2 G: 40 INJECTION, SOLUTION INTRAVENOUS at 17:31

## 2024-11-01 RX ADMIN — Medication 6 MG: at 22:17

## 2024-11-01 RX ADMIN — TRAZODONE HYDROCHLORIDE 150 MG: 50 TABLET ORAL at 22:17

## 2024-11-01 RX ADMIN — MORPHINE SULFATE 4 MG: 4 INJECTION INTRAVENOUS at 19:53

## 2024-11-01 RX ADMIN — PANTOPRAZOLE SODIUM 40 MG: 40 INJECTION, POWDER, FOR SOLUTION INTRAVENOUS at 17:02

## 2024-11-01 RX ADMIN — SODIUM CHLORIDE 500 ML: 0.9 INJECTION, SOLUTION INTRAVENOUS at 17:02

## 2024-11-01 RX ADMIN — FAMOTIDINE 20 MG: 10 INJECTION, SOLUTION INTRAVENOUS at 19:53

## 2024-11-01 RX ADMIN — CLONAZEPAM 0.5 MG: 0.5 TABLET ORAL at 21:48

## 2024-11-01 RX ADMIN — MORPHINE SULFATE 4 MG: 4 INJECTION INTRAVENOUS at 17:02

## 2024-11-01 RX ADMIN — ONDANSETRON 4 MG: 2 INJECTION INTRAMUSCULAR; INTRAVENOUS at 17:02
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
## 2024-11-02 VITALS
WEIGHT: 202.43 LBS | OXYGEN SATURATION: 95 % | SYSTOLIC BLOOD PRESSURE: 117 MMHG | HEIGHT: 69 IN | RESPIRATION RATE: 14 BRPM | DIASTOLIC BLOOD PRESSURE: 68 MMHG | HEART RATE: 112 BPM | BODY MASS INDEX: 29.98 KG/M2 | TEMPERATURE: 98.2 F

## 2024-11-02 PROBLEM — K29.20 ACUTE ALCOHOLIC GASTRITIS WITHOUT HEMORRHAGE: Status: ACTIVE | Noted: 2024-11-02

## 2024-11-02 PROBLEM — Z53.29 LEFT AGAINST MEDICAL ADVICE: Status: ACTIVE | Noted: 2019-11-06

## 2024-11-02 PROBLEM — E87.6 HYPOKALEMIA: Status: ACTIVE | Noted: 2024-11-02

## 2024-11-02 LAB
ALBUMIN SERPL BCG-MCNC: 2.4 G/DL (ref 3.5–5)
ALP SERPL-CCNC: 74 U/L (ref 34–104)
ALT SERPL W P-5'-P-CCNC: 17 U/L (ref 7–52)
AMPHETAMINES SERPL QL SCN: NEGATIVE
ANION GAP SERPL CALCULATED.3IONS-SCNC: 6 MMOL/L (ref 4–13)
AST SERPL W P-5'-P-CCNC: 64 U/L (ref 13–39)
BARBITURATES UR QL: NEGATIVE
BENZODIAZ UR QL: NEGATIVE
BILIRUB SERPL-MCNC: 4.62 MG/DL (ref 0.2–1)
BUN SERPL-MCNC: 5 MG/DL (ref 5–25)
CALCIUM ALBUM COR SERPL-MCNC: 8.6 MG/DL (ref 8.3–10.1)
CALCIUM SERPL-MCNC: 7.3 MG/DL (ref 8.4–10.2)
CHLORIDE SERPL-SCNC: 107 MMOL/L (ref 96–108)
CO2 SERPL-SCNC: 24 MMOL/L (ref 21–32)
COCAINE UR QL: NEGATIVE
CREAT SERPL-MCNC: 0.59 MG/DL (ref 0.6–1.3)
ERYTHROCYTE [DISTWIDTH] IN BLOOD BY AUTOMATED COUNT: 16 % (ref 11.6–15.1)
ETHANOL EXG-MCNC: 0.04 MG/DL
FENTANYL UR QL SCN: NEGATIVE
GFR SERPL CREATININE-BSD FRML MDRD: 118 ML/MIN/1.73SQ M
GLUCOSE SERPL-MCNC: 105 MG/DL (ref 65–140)
HCT VFR BLD AUTO: 31.3 % (ref 36.5–49.3)
HGB BLD-MCNC: 10.5 G/DL (ref 12–17)
HYDROCODONE UR QL SCN: NEGATIVE
MAGNESIUM SERPL-MCNC: 1.8 MG/DL (ref 1.9–2.7)
MCH RBC QN AUTO: 31.5 PG (ref 26.8–34.3)
MCHC RBC AUTO-ENTMCNC: 33.5 G/DL (ref 31.4–37.4)
MCV RBC AUTO: 94 FL (ref 82–98)
METHADONE UR QL: NEGATIVE
OPIATES UR QL SCN: POSITIVE
OXYCODONE+OXYMORPHONE UR QL SCN: NEGATIVE
PCP UR QL: NEGATIVE
PLATELET # BLD AUTO: 49 THOUSANDS/UL (ref 149–390)
PMV BLD AUTO: 9.5 FL (ref 8.9–12.7)
POTASSIUM SERPL-SCNC: 3.2 MMOL/L (ref 3.5–5.3)
PROT SERPL-MCNC: 5.6 G/DL (ref 6.4–8.4)
RBC # BLD AUTO: 3.33 MILLION/UL (ref 3.88–5.62)
SODIUM SERPL-SCNC: 137 MMOL/L (ref 135–147)
THC UR QL: POSITIVE
WBC # BLD AUTO: 3.4 THOUSAND/UL (ref 4.31–10.16)

## 2024-11-02 PROCEDURE — 99239 HOSP IP/OBS DSCHRG MGMT >30: CPT | Performed by: STUDENT IN AN ORGANIZED HEALTH CARE EDUCATION/TRAINING PROGRAM

## 2024-11-02 PROCEDURE — 80053 COMPREHEN METABOLIC PANEL: CPT | Performed by: PHYSICIAN ASSISTANT

## 2024-11-02 PROCEDURE — 85027 COMPLETE CBC AUTOMATED: CPT | Performed by: PHYSICIAN ASSISTANT

## 2024-11-02 PROCEDURE — 82075 ASSAY OF BREATH ETHANOL: CPT | Performed by: STUDENT IN AN ORGANIZED HEALTH CARE EDUCATION/TRAINING PROGRAM

## 2024-11-02 PROCEDURE — 83735 ASSAY OF MAGNESIUM: CPT | Performed by: PHYSICIAN ASSISTANT

## 2024-11-02 PROCEDURE — 80307 DRUG TEST PRSMV CHEM ANLYZR: CPT | Performed by: STUDENT IN AN ORGANIZED HEALTH CARE EDUCATION/TRAINING PROGRAM

## 2024-11-02 RX ORDER — METRONIDAZOLE 500 MG/1
500 TABLET ORAL EVERY 8 HOURS SCHEDULED
Qty: 30 TABLET | Refills: 0 | Status: SHIPPED | OUTPATIENT
Start: 2024-11-02 | End: 2024-11-12

## 2024-11-02 RX ORDER — METRONIDAZOLE 500 MG/1
500 TABLET ORAL ONCE
Status: COMPLETED | OUTPATIENT
Start: 2024-11-02 | End: 2024-11-02

## 2024-11-02 RX ORDER — SUCRALFATE 1 G/1
1 TABLET ORAL
Status: DISCONTINUED | OUTPATIENT
Start: 2024-11-02 | End: 2024-11-02 | Stop reason: HOSPADM

## 2024-11-02 RX ORDER — POTASSIUM CHLORIDE 1500 MG/1
40 TABLET, EXTENDED RELEASE ORAL 2 TIMES DAILY
Status: DISCONTINUED | OUTPATIENT
Start: 2024-11-02 | End: 2024-11-02 | Stop reason: HOSPADM

## 2024-11-02 RX ORDER — ONDANSETRON 4 MG/1
4 TABLET, ORALLY DISINTEGRATING ORAL EVERY 6 HOURS PRN
Qty: 20 TABLET | Refills: 0 | Status: SHIPPED | OUTPATIENT
Start: 2024-11-02

## 2024-11-02 RX ORDER — SUCRALFATE 1 G/1
1 TABLET ORAL 4 TIMES DAILY
Qty: 28 TABLET | Refills: 0 | Status: SHIPPED | OUTPATIENT
Start: 2024-11-02 | End: 2024-11-09

## 2024-11-02 RX ORDER — DICYCLOMINE HYDROCHLORIDE 10 MG/1
10 CAPSULE ORAL
Status: DISCONTINUED | OUTPATIENT
Start: 2024-11-02 | End: 2024-11-02 | Stop reason: HOSPADM

## 2024-11-02 RX ORDER — MAGNESIUM SULFATE 1 G/100ML
1 INJECTION INTRAVENOUS ONCE
Status: COMPLETED | OUTPATIENT
Start: 2024-11-02 | End: 2024-11-02

## 2024-11-02 RX ORDER — CEFDINIR 300 MG/1
300 CAPSULE ORAL EVERY 12 HOURS SCHEDULED
Qty: 20 CAPSULE | Refills: 0 | Status: SHIPPED | OUTPATIENT
Start: 2024-11-02 | End: 2024-11-12

## 2024-11-02 RX ORDER — FAMOTIDINE 10 MG
10 TABLET ORAL 2 TIMES DAILY PRN
Qty: 30 TABLET | Refills: 0 | Status: SHIPPED | OUTPATIENT
Start: 2024-11-02

## 2024-11-02 RX ORDER — PANTOPRAZOLE SODIUM 40 MG/1
40 TABLET, DELAYED RELEASE ORAL 2 TIMES DAILY
Qty: 120 TABLET | Refills: 0 | Status: SHIPPED | OUTPATIENT
Start: 2024-11-02

## 2024-11-02 RX ADMIN — CEFTRIAXONE SODIUM 2000 MG: 10 INJECTION, POWDER, FOR SOLUTION INTRAVENOUS at 13:41

## 2024-11-02 RX ADMIN — LACTULOSE 30 G: 20 SOLUTION ORAL at 08:53

## 2024-11-02 RX ADMIN — PANTOPRAZOLE SODIUM 40 MG: 40 TABLET, DELAYED RELEASE ORAL at 07:46

## 2024-11-02 RX ADMIN — THIAMINE HCL TAB 100 MG 100 MG: 100 TAB at 08:53

## 2024-11-02 RX ADMIN — ONDANSETRON 4 MG: 2 INJECTION INTRAMUSCULAR; INTRAVENOUS at 11:46

## 2024-11-02 RX ADMIN — FOLIC ACID 1 MG: 1 TABLET ORAL at 08:53

## 2024-11-02 RX ADMIN — METRONIDAZOLE 500 MG: 500 TABLET ORAL at 13:40

## 2024-11-02 RX ADMIN — DICYCLOMINE HYDROCHLORIDE 10 MG: 10 CAPSULE ORAL at 11:43

## 2024-11-02 RX ADMIN — DICYCLOMINE HYDROCHLORIDE 10 MG: 10 CAPSULE ORAL at 07:46

## 2024-11-02 RX ADMIN — CLONAZEPAM 0.5 MG: 0.5 TABLET ORAL at 13:30

## 2024-11-02 RX ADMIN — AMLODIPINE BESYLATE 2.5 MG: 2.5 TABLET ORAL at 08:53

## 2024-11-02 RX ADMIN — ACETAMINOPHEN 325 MG: 325 TABLET, FILM COATED ORAL at 03:25

## 2024-11-02 RX ADMIN — B-COMPLEX W/ C & FOLIC ACID TAB 1 TABLET: TAB at 08:53

## 2024-11-02 RX ADMIN — DICYCLOMINE HYDROCHLORIDE 10 MG: 10 CAPSULE ORAL at 03:25

## 2024-11-02 RX ADMIN — MAGNESIUM SULFATE HEPTAHYDRATE 1 G: 1 INJECTION, SOLUTION INTRAVENOUS at 13:01

## 2024-11-02 RX ADMIN — SODIUM CHLORIDE 1000 ML: 0.9 INJECTION, SOLUTION INTRAVENOUS at 13:29

## 2024-11-02 RX ADMIN — POTASSIUM CHLORIDE 40 MEQ: 1500 TABLET, EXTENDED RELEASE ORAL at 13:01

## 2024-11-02 NOTE — ASSESSMENT & PLAN NOTE
Patient presents with acute epigastric abdominal pain the setting of recurrent alcohol abuse and likely viral enteritis     CT abdomen/pelvis showing enteritis, cirrhosis and small-moderate ascites which is new with findings of portal hypertension  LFTs relatively stable ALT 19, AST 71, ALP 77, Tbili 6.33  K slightly low 3.3  Ammonia 53 [ref 18-72]   Patient's MELD Score is: 20 -- needs outpatient GI follow up  Last EGD 12/13/23 unremarkable   Continue home lactulose (titrate for 3 BM) , MVI, B1, folic acid, pantoprazole  CIWA protocol; counseled on avoiding alcohol and offered rehab .  thankfully provided info to contact and arrange if agreeable to inpatient alcohol rehab.

## 2024-11-02 NOTE — ASSESSMENT & PLAN NOTE
Will monitor overnight due to enteritis, advance diet as tolerated   Noted on CT scan, likely contributing to his epigastric pain, nausea and vomiting   Supportive care, bland diet as tolerated     11/2 had diarrhea in the morning ~ 7 times loose/watery , no lower abdominal pain but some epigastric tenderness, no blood in stool. Had his usual morning dose of lactulose but he noted never results in that much of bowel movements so it is suspected to be related to the enteritis noted on the CT.   Despite his GI Symptoms he insists on leaving AMA noting he will hydrate himself well at home .  Giving additional 1 L fluid before ago  Advised on as needed over-the-counter Imodium  Will try to take stool sample for enteric culture and C. difficile before he goes AMA  Advised to hold off any further lactulose till the diarrhea resolved  Advised him close follow-up outpatient with PCP and GI  Again complications of refractory diarrhea and dehydration explained to the patient include organ damage, hypotension, etc. but he refused to stay as noted above  Given the situation and other comorbidities including ascites empirically will start antibiotic, he agreed to take first dose before leaving Rocephin plus Flagyl and a course of outpatient antibiotic prescribed and sent to his pharmacy of choice

## 2024-11-02 NOTE — ASSESSMENT & PLAN NOTE
Chronic and stable likely secondary to his alcoholic liver cirrhosis  WBC 4.23, Hgb 11.6, Plt 64  Will monitor for s/sx of bleeding

## 2024-11-02 NOTE — CASE MANAGEMENT
Case Management Progress Note    Patient name Robert Combs  Location /-01 MRN [id number]  : 1975 Date 2024       LOS (days): 0  Geometric Mean LOS (GMLOS) (days):   Days to GMLOS:        OBJECTIVE:        Current admission status: Observation  Preferred Pharmacy:   [id number] - [place], [place] - [address]  [address]  [place] [address]  Phone: [phone] Fax: [phone]    Primary Care Provider: Yolanda Billy DO    Primary Insurance: DANGELO CANDELARIO  Secondary Insurance:     PROGRESS NOTE:  CM met with PRATEEK MASON to discuss patient preference for dual program. CM messaged attending Dr Bailey regarding need for breathalyzer and uds as well. CM to give resources to patient for [facility]. Will continue to follow case.

## 2024-11-02 NOTE — ASSESSMENT & PLAN NOTE
Noted on CT scan, likely etiology for his abdominal pain, nausea and vomiting   Supportive care, bland diet as tolerated

## 2024-11-02 NOTE — ASSESSMENT & PLAN NOTE
Tearful during interview  Continue home regimen trazodone 150 mg HS, clonazepam 0.5 mg TID prn   PDMP reviewed

## 2024-11-02 NOTE — ASSESSMENT & PLAN NOTE
"Presented with upper abdominal pain / epigastric , in context of alcohol use and liver cirrhosis   Started on Protonix 40 mg BID , continue    Insisting on AMA   Discontinue home Diclofenac and Ibuprofen, advised to not use NSAID   Advised to avoid alcohol   CM gave info on alcohol rehab facility \"[facility]\"   Dc on BID PPI + Sucralfate and PRN Pepcid  Advised to avoid spicy foods    "
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
## 2024-11-02 NOTE — ASSESSMENT & PLAN NOTE
"Noted on CT scan, likely etiology for his abdominal pain, nausea and vomiting , and reports diarrhea on 11/2, had 6 BM this morning   Supportive care, bland diet as tolerated     11/2 reports diarrhea this morning , however after was given the lactulose \"about 6 bowel movements\"   No lower abdominal pain or tenderness. Epigastric pain now improved on PPI , denies N/V today  Holding off lactulose for 2 days, IV fluids and encourage PO as tolerable   Enteric Panel and C Diff testing   Clinically less suspicious for SBP or colitis given no lower abd tenderness, also denies any blood in stool     Insisting on AMA , explained benefit vs risk and possible complications   Given his decision to leave, he agree to get 1 L NS bolus + dose of Rocephin and Flagyl before he goes , to continue PO Abx for 10 days and follow with PCP   Advised on well hydration as possible               "

## 2024-11-02 NOTE — ASSESSMENT & PLAN NOTE
"11/2 patient insisted on leaving AMA noted will drink alcohol. Offered to increase /add anxiolytic medications if that will help his urge to leave and make him stay for further management of his conditions listed below but he refused. Gladly after explaining benefits/risk he at least agreed to stay for about two hours till finishing 1 L NS bolus, dose of Rocephin/flagyl and the IV mag.     The following instructions provided in verbal and written:   Avoid alcohol   Call alcohol rehab \"[facility]\"   Avoid NSAID ; home medications listed Diclofenac and Ibuprofen both DISCONTINUED   Start taking Pantoprazole 40 mg Twice Daily and also as needed up to twice daily Famotidine 10 mg for acid reflux/stomach pain   As needed Zofran 4 mg every 6 hours for nausea / vomiting  As needed over the counter imodium for diarrhea   Well hydration as possible   Take the prescribed antibiotics (Cefdinir and Flagyl) as prescribed for 10 days course  If worsening abdominal pain or non resolving diarrhea; and if new symptoms like fever, coughing or vomiting blood, blood in stool, or others please call EMS / 911 or go to the neared Emergency room   Recommend close follow up with your PCP in 1 week from this discharge   "

## 2024-11-02 NOTE — ASSESSMENT & PLAN NOTE
Patient presents with acute abdominal pain the setting of recurrent alcohol abuse and likely viral enteritis     CT abdomen/pelvis showing enteritis, cirrhosis and small-moderate ascites which is new with findings of portal hypertension  LFTs relatively stable ALT 19, AST 71, ALP 77, Tbili 6.33  K slightly low 3.3  Ammonia not elevated , 53    Will monitor overnight due to enteritis, advance diet as tolerated   Patient's MELD Score is: 20 -- needs outpatient GI follow up  Last EGD 12/13/23 unremarkable    MVI, B1, folic acid, pantoprazole ; 11/2 held lactulose due to diarrhea   Consider initiating lasix/spironolactone?  CIWA protocol

## 2024-11-02 NOTE — ASSESSMENT & PLAN NOTE
Patient presents with acute abdominal pain the setting of recurrent alcohol abuse and likely viral enteritis     CT abdomen/pelvis showing enteritis, cirrhosis and small-moderate ascites which is new with findings of portal hypertension  LFTs relatively stable ALT 19, AST 71, ALP 77, Tbili 6.33  K slightly low 3.3  Ammonia pending   Will monitor overnight due to enteritis, advance diet as tolerated   Patient's MELD Score is: 22 -- needs outpatient GI follow up  Last EGD 12/13/23 unremarkable   Continue lactulose, MVI, B1, folic acid, pantoprazole  Consider initiating lasix/spironolactone?  CIWA protocol

## 2024-11-02 NOTE — PLAN OF CARE
Problem: PAIN - ADULT  Goal: Verbalizes/displays adequate comfort level or baseline comfort level  Description: Interventions:  - Encourage patient to monitor pain and request assistance  - Assess pain using appropriate pain scale  - Administer analgesics based on type and severity of pain and evaluate response  - Implement non-pharmacological measures as appropriate and evaluate response  - Consider cultural and social influences on pain and pain management  - Notify physician/advanced practitioner if interventions unsuccessful or patient reports new pain  Outcome: Progressing     Problem: INFECTION - ADULT  Goal: Absence or prevention of progression during hospitalization  Description: INTERVENTIONS:  - Assess and monitor for signs and symptoms of infection  - Monitor lab/diagnostic results  - Monitor all insertion sites, i.e. indwelling lines, tubes, and drains  - Monitor endotracheal if appropriate and nasal secretions for changes in amount and color  - [place] appropriate cooling/warming therapies per order  - Administer medications as ordered  - Instruct and encourage patient and family to use good hand hygiene technique  - Identify and instruct in appropriate isolation precautions for identified infection/condition  Outcome: Progressing  Goal: Absence of fever/infection during neutropenic period  Description: INTERVENTIONS:  - Monitor WBC    Outcome: Progressing     Problem: SAFETY ADULT  Goal: Patient will remain free of falls  Description: INTERVENTIONS:  - Educate patient/family on patient safety including physical limitations  - Instruct patient to call for assistance with activity   - Consult OT/PT to assist with strengthening/mobility   - Keep Call bell within reach  - Keep bed low and locked with side rails adjusted as appropriate  - Keep care items and personal belongings within reach  - Initiate and maintain comfort rounds  - Make Fall Risk Sign visible to staff    - Apply yellow socks and  bracelet for high fall risk patients  - Consider moving patient to room near nurses station  Outcome: Progressing  Goal: Maintain or return to baseline ADL function  Description: INTERVENTIONS:  -  Assess patient's ability to carry out ADLs; assess patient's baseline for ADL function and identify physical deficits which impact ability to perform ADLs (bathing, care of mouth/teeth, toileting, grooming, dressing, etc.)  - Assess/evaluate cause of self-care deficits   - Assess range of motion  - Assess patient's mobility; develop plan if impaired  - Assess patient's need for assistive devices and provide as appropriate  - Encourage maximum independence but intervene and supervise when necessary  - Involve family in performance of ADLs  - Assess for home care needs following discharge   - Consider OT consult to assist with ADL evaluation and planning for discharge  - Provide patient education as appropriate  Outcome: Progressing  Goal: Maintains/Returns to pre admission functional level  Description: INTERVENTIONS:  - Perform AM-PAC 6 Click Basic Mobility/ Daily Activity assessment daily.  - Set and communicate daily mobility goal to care team and patient/family/caregiver.   - Collaborate with rehabilitation services on mobility goals if consulted    Problem: SAFETY ADULT  Goal: Patient will remain free of falls  Description: INTERVENTIONS:  - Educate patient/family on patient safety including physical limitations  - Instruct patient to call for assistance with activity   - Consult OT/PT to assist with strengthening/mobility   - Keep Call bell within reach  - Keep bed low and locked with side rails adjusted as appropriate  - Keep care items and personal belongings within reach  - Initiate and maintain comfort rounds  - Make Fall Risk Sign visible to staff    - Apply yellow socks and bracelet for high fall risk patients  - Consider moving patient to room near nurses station  Outcome: Progressing  Goal: Maintain or return  to baseline ADL function  Description: INTERVENTIONS:  -  Assess patient's ability to carry out ADLs; assess patient's baseline for ADL function and identify physical deficits which impact ability to perform ADLs (bathing, care of mouth/teeth, toileting, grooming, dressing, etc.)  - Assess/evaluate cause of self-care deficits   - Assess range of motion  - Assess patient's mobility; develop plan if impaired  - Assess patient's need for assistive devices and provide as appropriate  - Encourage maximum independence but intervene and supervise when necessary  - Involve family in performance of ADLs  - Assess for home care needs following discharge   - Consider OT consult to assist with ADL evaluation and planning for discharge  - Provide patient education as appropriate  Outcome: Progressing  Goal: Maintains/Returns to pre admission functional level  Description: INTERVENTIONS:  - Perform AM-PAC 6 Click Basic Mobility/ Daily Activity assessment daily.  - Set and communicate daily mobility goal to care team and patient/family/caregiver.   - Collaborate with rehabilitation services on mobility goals if consulted    Problem: DISCHARGE PLANNING  Goal: Discharge to home or other facility with appropriate resources  Description: INTERVENTIONS:  - Identify barriers to discharge w/patient and caregiver  - Arrange for needed discharge resources and transportation as appropriate  - Identify discharge learning needs (meds, wound care, etc.)  - Arrange for interpretive services to assist at discharge as needed  - Refer to Case Management Department for coordinating discharge planning if the patient needs post-hospital services based on physician/advanced practitioner order or complex needs related to functional status, cognitive ability, or social support system  Outcome: Progressing     Problem: Knowledge Deficit  Goal: Patient/family/caregiver demonstrates understanding of disease process, treatment plan, medications, and  discharge instructions  Description: Complete learning assessment and assess knowledge base.  Interventions:  - Provide teaching at level of understanding  - Provide teaching via preferred learning methods  Outcome: Progressing     - Out of bed for toileting  - Record patient progress and toleration of activity level   Outcome: Progressing     - Out of bed for toileting  - Record patient progress and toleration of activity level   Outcome: Progressing

## 2024-11-02 NOTE — NURSING NOTE
POCT alcohol breath test     POCT alcohol breath test by ED unit Jagdish johnson, Results 0.0421

## 2024-11-02 NOTE — OCCUPATIONAL THERAPY NOTE
Occupational Therapy         Patient Name: Robert Combs  Today's Date: 11/2/2024 11/02/24 1105   OT Last Visit   OT Visit Date 11/02/24   Note Type   Note type Screen   Additional Comments Orders received, chart reviewed. Pt stated he has been completing  ADLs and mobility at his baseline. Pt able to demonstrate independence with mobility. Pt does not have any further skilled OT needs at this time, will sign off.

## 2024-11-02 NOTE — CASE MANAGEMENT
Case Management Assessment & Discharge Planning Note    Patient name Robert Combs  Location /-01 MRN [id number]  : 1975 Date 2024       Current Admission Date: 2024  Current Admission Diagnosis:Alcoholic cirrhosis of liver without ascites (HCC)   Patient Active Problem List    Diagnosis Date Noted Date Diagnosed    Acute alcoholic gastritis without hemorrhage 2024     Hypokalemia 2024     Pancytopenia (HCC) 2024     Enteritis 2024     Alcohol intoxication with delirium (HCC) 2024     Thrombocytopenia (HCC) 2024     Suicidal behavior with attempted self-injury (HCC) 2024     Ambulatory dysfunction 2024     Abnormal abdominal CT scan 2024     Alcoholic cirrhosis of liver without ascites (HCC) 10/31/2023     Gallstones 10/31/2023     Incarcerated umbilical hernia 10/25/2023     Left against medical advice 2019     Hepatitis C virus infection without hepatic coma 2019     Chronic bilateral low back pain with bilateral sciatica 2019     Alcohol abuse, daily use 2019     Marijuana use 2019     Depression with anxiety 2019       LOS (days): 0  Geometric Mean LOS (GMLOS) (days):   Days to GMLOS:     OBJECTIVE:              Current admission status: Observation  Referral Reason: Drug/Alcohol Abuse    Preferred Pharmacy:   [id number] - [place], [place] - [address]  [address]  [place] [address]  Phone: [phone] Fax: [phone]    Primary Care Provider: Yolanda Billy DO    Primary Insurance: DANGELO CANDELARIO  Secondary Insurance:     ASSESSMENT:  Active Health Care Proxies    There are no active Health Care Proxies on file.       Advance Directives  Does patient have a Health Care POA?: No  Was patient offered paperwork?: Yes  Does patient currently have a Health Care decision maker?: No  Does patient have Advance Directives?: No  Was patient offered paperwork?: Yes  Primary Contact:  Victoria, girlfriend         Readmission Root [facility]  30 Day Readmission: No    Patient Information  Admitted from:: Home  Mental Status: Alert  During Assessment patient was accompanied by: Not accompanied during assessment  Assessment information provided by:: Patient  Primary Caregiver: Self  Support Systems: Spouse/significant other, Self  County of Residence: [place]  What city do you live in?: [place]  Home entry access options. Select all that apply.: Stairs  Number of steps to enter home.: 3  Do the steps have railings?: Yes  Type of Current Residence: [facility]  Living Arrangements: Lives w/ Spouse/significant other  Is patient a ?: No    Activities of Daily Living Prior to Admission  Functional Status: Independent  Completes ADLs independently?: Yes  Ambulates independently?: Yes  Does patient use assisted devices?: No  Does patient currently own DME?: No  Does patient have a history of Outpatient Therapy (PT/OT)?: No  Does the patient have a history of Short-Term Rehab?: No  Does patient have a history of HHC?: No  Does patient currently have HHC?: No         Patient Information Continued  Income Source: Unemployed  Does patient have prescription coverage?: Yes  Does patient receive dialysis treatments?: No  Does patient have a history of substance abuse?: Currently using  Current substance use preference: Alcohol/ETOH  History of Withdrawal Symptoms: Denies past symptoms  Is patient currently in treatment for substance abuse?: Patient provided treatment options.  Does patient have a history of Mental Health Diagnosis?: Yes (anxiety depression)  Is patient receiving treatment for mental health?: Yes  Has patient received inpatient treatment related to mental health in the last 2 years?: No         Means of Transportation  Means of Transport to Appts:: Family transport      Social Determinants of Health (SDOH)      Flowsheet Row Most Recent Value   Housing Stability    In the last 12 months, was there a  time when you were not able to pay the mortgage or rent on time? N   In the past 12 months, how many times have you moved where you were living? 0   At any time in the past 12 months, were you homeless or living in a shelter (including now)? N   Transportation Needs    In the past 12 months, has lack of transportation kept you from medical appointments or from getting medications? no   In the past 12 months, has lack of transportation kept you from meetings, work, or from getting things needed for daily living? No   Food Insecurity    Within the past 12 months, you worried that your food would run out before you got the money to buy more. Never true   Within the past 12 months, the food you bought just didn't last and you didn't have money to get more. Never true   Utilities    In the past 12 months has the electric, gas, oil, or water company threatened to shut off services in your home? No            DISCHARGE DETAILS:    Discharge planning discussed with:: Patient at the bedside  Freedom of Choice: Yes  Comments - Freedom of Choice: FOC maintained in discussion regarding discharge planning. Patient is alert oriented and competent to make decisions. Introduced self and role, explained role of CM in arranging services such as DME, STR, HHC, and providing community resource information. Discussed current living situation and needs at discharge. Patient is IPTA. CM offered HHC, STR, DME, PCP, OP CM, community resources for OP MH and SA, including [facility] and [facility]. Patient accepted information, states cannot go IP at this time because it will interfere with parole. Considering OP. Waiting for his girlfriend to come to hospital to discuss with doctor. Does not use DME. Pt is aware and encouraged to seek CM for any questions or concerns. CM continues to follow.  CM contacted family/caregiver?: No- see comments  Were Treatment Team discharge recommendations reviewed with patient/caregiver?: Yes  Did  patient/caregiver verbalize understanding of patient care needs?: Yes  Were patient/caregiver advised of the risks associated with not following Treatment Team discharge recommendations?: Yes    Contacts  Patient Contacts: Victoria Chiu (Significant Other)  [phone]  Reason/Outcome: Emergency Contact    Requested Home Health Care         Is the patient interested in HHC at discharge?: No    DME Referral Provided  Referral made for DME?: No    Other Referral/Resources/Interventions Provided:  Interventions: Substance Abuse Treatment  Referral Comments: CM will continue to follow for needs.    Would you like to participate in our [facility] service program?  : No - Declined    Treatment Team Recommendation: Home  Discharge Destination Plan:: Home  Transport at Discharge : Family
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
## 2024-11-02 NOTE — DISCHARGE INSTR - AVS FIRST PAGE
"Avoid alcohol   Call alcohol rehab \"[facility]\"   Avoid NSAID ; home medications listed Diclofenac and Ibuprofen both DISCONTINUED   Start taking Pantoprazole 40 mg Twice Daily and also as needed up to twice daily Famotidine 10 mg for acid reflux/stomach pain   As needed Zofran 4 mg every 6 hours for nausea / vomiting  As needed over the counter imodium for diarrhea   Well hydration as possible   Take the prescribed antibiotics (Cefdinir and Flagyl) as prescribed for 10 days course  If worsening abdominal pain or non resolving diarrhea; and if new symptoms like fever, coughing or vomiting blood, blood in stool, or others please call EMS / 911 or go to the neared Emergency room   Recommend close follow up with your PCP in 1 week from this discharge   "
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
## 2024-11-02 NOTE — PLAN OF CARE
Problem: PAIN - ADULT  Goal: Verbalizes/displays adequate comfort level or baseline comfort level  Description: Interventions:  - Encourage patient to monitor pain and request assistance  - Assess pain using appropriate pain scale  - Administer analgesics based on type and severity of pain and evaluate response  - Implement non-pharmacological measures as appropriate and evaluate response  - Consider cultural and social influences on pain and pain management  - Notify physician/advanced practitioner if interventions unsuccessful or patient reports new pain  Outcome: Progressing     Problem: INFECTION - ADULT  Goal: Absence or prevention of progression during hospitalization  Description: INTERVENTIONS:  - Assess and monitor for signs and symptoms of infection  - Monitor lab/diagnostic results  - Monitor all insertion sites, i.e. indwelling lines, tubes, and drains  - Monitor endotracheal if appropriate and nasal secretions for changes in amount and color  - [place] appropriate cooling/warming therapies per order  - Administer medications as ordered  - Instruct and encourage patient and family to use good hand hygiene technique  - Identify and instruct in appropriate isolation precautions for identified infection/condition  Outcome: Progressing  Goal: Absence of fever/infection during neutropenic period  Description: INTERVENTIONS:  - Monitor WBC    Outcome: Progressing     Problem: SAFETY ADULT  Goal: Patient will remain free of falls  Description: INTERVENTIONS:  - Educate patient/family on patient safety including physical limitations  - Instruct patient to call for assistance with activity   - Consult OT/PT to assist with strengthening/mobility   - Keep Call bell within reach  - Keep bed low and locked with side rails adjusted as appropriate  - Keep care items and personal belongings within reach  - Initiate and maintain comfort rounds  - Make Fall Risk Sign visible to staff  - Offer Toileting every  Hours,  in advance of need  - Initiate/Maintain alarm  - Obtain necessary fall risk management equipment:   - Apply yellow socks and bracelet for high fall risk patients  - Consider moving patient to room near nurses station  Outcome: Progressing  Goal: Maintain or return to baseline ADL function  Description: INTERVENTIONS:  -  Assess patient's ability to carry out ADLs; assess patient's baseline for ADL function and identify physical deficits which impact ability to perform ADLs (bathing, care of mouth/teeth, toileting, grooming, dressing, etc.)  - Assess/evaluate cause of self-care deficits   - Assess range of motion  - Assess patient's mobility; develop plan if impaired  - Assess patient's need for assistive devices and provide as appropriate  - Encourage maximum independence but intervene and supervise when necessary  - Involve family in performance of ADLs  - Assess for home care needs following discharge   - Consider OT consult to assist with ADL evaluation and planning for discharge  - Provide patient education as appropriate  Outcome: Progressing  Goal: Maintains/Returns to pre admission functional level  Description: INTERVENTIONS:  - Perform AM-PAC 6 Click Basic Mobility/ Daily Activity assessment daily.  - Set and communicate daily mobility goal to care team and patient/family/caregiver.   - Collaborate with rehabilitation services on mobility goals if consulted  - Perform Range of Motion times a day.  - Reposition patient every hours.  - Dangle patient  times a day  - Stand patient times a day  - Ambulate patient times a day  - Out of bed to chair times a day   - Out of bed for meal times a day  - Out of bed for toileting  - Record patient progress and toleration of activity level   Outcome: Progressing     Problem: DISCHARGE PLANNING  Goal: Discharge to home or other facility with appropriate resources  Description: INTERVENTIONS:  - Identify barriers to discharge w/patient and caregiver  - Arrange for needed  discharge resources and transportation as appropriate  - Identify discharge learning needs (meds, wound care, etc.)  - Arrange for interpretive services to assist at discharge as needed  - Refer to Case Management Department for coordinating discharge planning if the patient needs post-hospital services based on physician/advanced practitioner order or complex needs related to functional status, cognitive ability, or social support system  Outcome: Progressing     Problem: Knowledge Deficit  Goal: Patient/family/caregiver demonstrates understanding of disease process, treatment plan, medications, and discharge instructions  Description: Complete learning assessment and assess knowledge base.  Interventions:  - Provide teaching at level of understanding  - Provide teaching via preferred learning methods  Outcome: Progressing     Problem: Nutrition/Hydration-ADULT  Goal: Nutrient/Hydration intake appropriate for improving, restoring or maintaining nutritional needs  Description: Monitor and assess patient's nutrition/hydration status for malnutrition. Collaborate with interdisciplinary team and initiate plan and interventions as ordered.  Monitor patient's weight and dietary intake as ordered or per policy. Utilize nutrition screening tool and intervene as necessary. Determine patient's food preferences and provide high-protein, high-caloric foods as appropriate.     INTERVENTIONS:  - Monitor oral intake, urinary output, labs, and treatment plans  - Assess nutrition and hydration status and recommend course of action  - Evaluate amount of meals eaten  - Assist patient with eating if necessary   - Allow adequate time for meals  - Recommend/ encourage appropriate diets, oral nutritional supplements, and vitamin/mineral supplements  - Order, calculate, and assess calorie counts as needed  - Recommend, monitor, and adjust tube feedings and TPN/PPN based on assessed needs  - Assess need for intravenous fluids  - Provide  specific nutrition/hydration education as appropriate  - Include patient/family/caregiver in decisions related to nutrition  Outcome: Progressing

## 2024-11-02 NOTE — DISCHARGE SUMMARY
"Discharge Summary - Hospitalist   Name: Robert Combs 49 y.o. male I MRN: [id number]  Unit/Bed#: -01 I Date of Admission: 11/1/2024   Date of Service: 11/2/2024 I Hospital Day: 0     Assessment & Plan  Left against medical advice  11/2 patient insisted on leaving AMA noted will drink alcohol. Offered to increase /add anxiolytic medications if that will help his urge to leave and make him stay for further management of his conditions listed below but he refused. Gladly after explaining benefits/risk he at least agreed to stay for about two hours till finishing 1 L NS bolus, dose of Rocephin/flagyl and the IV mag.     The following instructions provided in verbal and written:   Avoid alcohol   Call alcohol rehab \"[facility]\"   Avoid NSAID ; home medications listed Diclofenac and Ibuprofen both DISCONTINUED   Start taking Pantoprazole 40 mg Twice Daily and also as needed up to twice daily Famotidine 10 mg for acid reflux/stomach pain   As needed Zofran 4 mg every 6 hours for nausea / vomiting  As needed over the counter imodium for diarrhea   Well hydration as possible   Take the prescribed antibiotics (Cefdinir and Flagyl) as prescribed for 10 days course  If worsening abdominal pain or non resolving diarrhea; and if new symptoms like fever, coughing or vomiting blood, blood in stool, or others please call EMS / 911 or go to the neared Emergency room   Recommend close follow up with your PCP in 1 week from this discharge   Alcoholic cirrhosis of liver without ascites (HCC)  Patient presents with acute epigastric abdominal pain the setting of recurrent alcohol abuse and likely viral enteritis     CT abdomen/pelvis showing enteritis, cirrhosis and small-moderate ascites which is new with findings of portal hypertension  LFTs relatively stable ALT 19, AST 71, ALP 77, Tbili 6.33  K slightly low 3.3  Ammonia 53 [ref 18-72]   Patient's MELD Score is: 20 -- needs outpatient GI follow up  Last EGD 12/13/23 unremarkable " "  Continue home lactulose (titrate for 3 BM) , MVI, B1, folic acid, pantoprazole  CIWA protocol; counseled on avoiding alcohol and offered rehab .  thankfully provided info to contact and arrange if agreeable to inpatient alcohol rehab.   Acute alcoholic gastritis without hemorrhage  Abdomina pain is limited to the epigastric region , denies any evidence of bleeding . Denies NSAID use but home medications on discharge included Ibuprofen and po Diclofenac, which are discontinued.     Patient noted that last few weeks he almost doubled the amount of beer he drinks daily to two \"large\" cans     Leaving AMA   BID Pantoprazole 40 mg BID + Sucralfate 1 g QID x 7 days + PRN BID 10 mg Pepcid   Counseled to avoid alcohol, NSAID os spicy foods           Enteritis  Will monitor overnight due to enteritis, advance diet as tolerated   Noted on CT scan, likely contributing to his epigastric pain, nausea and vomiting   Supportive care, bland diet as tolerated     11/2 had diarrhea in the morning ~ 7 times loose/watery , no lower abdominal pain but some epigastric tenderness, no blood in stool. Had his usual morning dose of lactulose but he noted never results in that much of bowel movements so it is suspected to be related to the enteritis noted on the CT.   Despite his GI Symptoms he insists on leaving AMA noting he will hydrate himself well at home .  Giving additional 1 L fluid before ago  Advised on as needed over-the-counter Imodium  Will try to take stool sample for enteric culture and C. difficile before he goes AMA  Advised to hold off any further lactulose till the diarrhea resolved  Advised him close follow-up outpatient with PCP and GI  Again complications of refractory diarrhea and dehydration explained to the patient include organ damage, hypotension, etc. but he refused to stay as noted above  Given the situation and other comorbidities including ascites empirically will start antibiotic, he agreed to " take first dose before leaving Rocephin plus Flagyl and a course of outpatient antibiotic prescribed and sent to his pharmacy of choice      Pancytopenia (HCC)  Chronic and stable likely secondary to his alcoholic liver cirrhosis  WBC 4.23, Hgb 11.6, Plt 64  Will monitor for s/sx of bleeding   Marijuana use  Reports medical marijuana use, positive from the urine drug screen  Depression with anxiety  Tearful during interview  Continue home regimen trazodone 150 mg HS, clonazepam 0.5 mg TID prn   PDMP reviewed   Ambulatory dysfunction  Suspect acute on chronic  Will ask PT/OT to evaluate   Hypokalemia  Suspected due to vomiting and diarrhea  As needed Zofran, IV fluids, magnesium and potassium repletion  Polysubstance abuse (HCC)  Urine drug screen positive for marijuana and opiates, blood ethanol elevated on admission    Patient confirms drinking at least 2 large cans of beer daily bigger in size than what he used to drink in the past and that he uses marijuana, but he denies any opioids use prior to admission noting that he received in the emergency room on arrival before testing    Counseled on the complication risks of the drugs noted above and alcohol advised on quitting especially with his liver cirrhosis and now with ascites, patient expressed understanding of the seriousness of his situation however he is not willing to take actual steps to quit at the moment and refusing to go to inpatient drug rehab,  met with the patient and provided information to contact if he changes his mind.  He insisted on leaving AMA while he is deemed fully alert and oriented and competent and his wife at bedside as well and she seemed in agreement with his leaving.      Admission Date: 11/1/2024   Discharge Date: 11/2/2024  Discharging Provider: Yovani Bailey DO    PCP:  [phone]    HPI: Refer to previous hospitalization for complete record    Summary of [facility] Course:   Patient presented to the hospital on 11/20/2024  with epigastric pain nausea and vomiting, imaging showed enteritis and small to medium ascites along with his liver cirrhosis.  Ethanol level was elevated and urine drug screen positive for THC and opioids however patient denies any opiate use and noted that the test was positive after he was given opiates in the ED for his anxiety.  Per chart review he was given morphine injections as early as 1702 on 11/1 and rapid during drug screen was taken on 11/02    Patient was monitored overnight and was given fluids, thrombocytopenia noted however chronic and stable, his home lactulose was continued on admission and on 11/2 diarrhea multiple watery/loose bowel movements noted in the morning about 7 times, for which management escalations was considered however the patient expresses wish to go home so he can drink alcohol he was already on CIWA protocol.  With as needed medication for anxiety.  Discussed the possible risks and complications if he leaves while symptomatic with diarrhea including dehydration organ failure hypotension etc. but patient insisted on leaving despite I offered him the option of increasing his anxiolytic medications and making them more scheduled to ensure his withdrawal symptoms/anxiety under control but he refused.  His wife was visiting around that time and she contributed in the discussion of the above and seemed in agreement with him leaving however gladly she help in  convincing him to stay for additional 2 hours or so so that we can give him some IV fluids and start antibiotics before he leaves.  Also RN to attempt to collect stool sampling for testing as above.     Problem List/Comorbidities:  Problem List Items Addressed This Visit          Digestive    Hepatitis C virus infection without hepatic coma    Relevant Orders    Ambulatory Referral to Gastroenterology    Enteritis     Noted on CT scan, likely etiology for his abdominal pain, nausea and vomiting   Supportive care, bland diet as  tolerated          Relevant Medications    metroNIDAZOLE (FLAGYL) 500 mg tablet    famotidine (PEPCID) 10 mg tablet    sucralfate (CARAFATE) 1 g tablet    ondansetron (ZOFRAN-ODT) 4 mg disintegrating tablet    cefdinir (OMNICEF) 300 mg capsule    * (Principal) Alcoholic cirrhosis of liver without ascites (HCC)     Patient presents with acute abdominal pain the setting of recurrent alcohol abuse and likely viral enteritis     CT abdomen/pelvis showing enteritis, cirrhosis and small-moderate ascites which is new with findings of portal hypertension  LFTs relatively stable ALT 19, AST 71, ALP 77, Tbili 6.33  K slightly low 3.3  Ammonia pending   Will monitor overnight due to enteritis, advance diet as tolerated   Patient's MELD Score is: 20 -- needs outpatient GI follow up  Last EGD 12/13/23 unremarkable   Continue lactulose, MVI, B1, folic acid, pantoprazole  Consider initiating lasix/spironolactone?  CIWA protocol         Relevant Medications    pantoprazole (PROTONIX) 40 mg tablet    metroNIDAZOLE (FLAGYL) 500 mg tablet    famotidine (PEPCID) 10 mg tablet    sucralfate (CARAFATE) 1 g tablet    ondansetron (ZOFRAN-ODT) 4 mg disintegrating tablet    cefdinir (OMNICEF) 300 mg capsule    Other Relevant Orders    Ambulatory Referral to Gastroenterology    Inpatient consult to Case Management (Completed)    Acute alcoholic gastritis without hemorrhage    Relevant Medications    pantoprazole (PROTONIX) 40 mg tablet    metroNIDAZOLE (FLAGYL) 500 mg tablet    famotidine (PEPCID) 10 mg tablet    sucralfate (CARAFATE) 1 g tablet    ondansetron (ZOFRAN-ODT) 4 mg disintegrating tablet    cefdinir (OMNICEF) 300 mg capsule       Behavioral Health    Suicidal behavior with attempted self-injury (HCC)    Depression with anxiety     Tearful during interview  Continue home regimen trazodone 150 mg HS, clonazepam 0.5 mg TID prn   PDMP reviewed          Relevant Orders    Inpatient consult to Case Management (Completed)     Other Visit  "Diagnoses       Ascites    -  Primary    Relevant Orders    Ambulatory Referral to Gastroenterology             Discharge Physical Examination:  /68   Pulse (!) 112   Temp 98.2 °F (36.8 °C) (Oral)   Resp 14   Ht 5' 9\" (1.753 m)   Wt 91.8 kg (202 lb 6.8 oz)   SpO2 95%   BMI 29.89 kg/m²   Physical Exam  Constitutional:       General: He is in acute distress.      Appearance: He is ill-appearing. He is not toxic-appearing or diaphoretic.      Comments: Anxious with some resting tremors noted    HENT:      Right Ear: External ear normal.      Left Ear: External ear normal.      Nose: Nose normal.      Mouth/Throat:      Mouth: Mucous membranes are dry.      Pharynx: No oropharyngeal exudate or posterior oropharyngeal erythema.   Cardiovascular:      Rate and Rhythm: Regular rhythm. Tachycardia present.      Pulses: Normal pulses.      Heart sounds: Normal heart sounds. No murmur heard.  Pulmonary:      Effort: Pulmonary effort is normal. No respiratory distress.      Breath sounds: Normal breath sounds. No wheezing or rales.   Abdominal:      General: There is distension.      Tenderness: There is abdominal tenderness. There is no guarding or rebound.      Comments: Epigastric mild tenderness patient, mild abdominal distention but no shift appreciated, no diffuse/lower abdomen tenderness or guarding, no masses or organomegaly   Musculoskeletal:      Right lower leg: No edema.      Left lower leg: No edema.   Skin:     Capillary Refill: Capillary refill takes less than 2 seconds.      Coloration: Skin is not jaundiced or pale.      Findings: No rash.   Neurological:      General: No focal deficit present.      Mental Status: He is alert and oriented to person, place, and time.      Cranial Nerves: No cranial nerve deficit.      Motor: No weakness.   Psychiatric:      Comments: Anxious and rushing to leave the hospital noted will drink alcohol again         Condition at Discharge: poor     Discharge " instructions/Information to patient and family:   See after visit summary for information provided to patient and family.      Provisions for Follow-Up Care:  See after visit summary for information related to follow-up care and any pertinent home health orders.      No future appointments.    Disposition: Home    Planned Readmission: No  Discharge Statement   I have spent a total time of 60 minutes in caring for this patient on the day of the visit/encounter. >30 minutes of time was spent on: Diagnostic results, Prognosis, Risks and benefits of tx options, Instructions for management, Patient and family education, Importance of tx compliance, Risk factor reductions, Impressions, Counseling / Coordination of care, Documenting in the medical record, Reviewing / ordering tests, medicine, procedures  , and Communicating with other healthcare professionals .    Discharge Medications:  See after visit summary for reconciled discharge medications provided to patient and family.

## 2024-11-03 PROBLEM — F19.10 POLYSUBSTANCE ABUSE (HCC): Status: ACTIVE | Noted: 2024-11-03

## 2024-11-03 NOTE — ASSESSMENT & PLAN NOTE
"Abdomina pain is limited to the epigastric region , denies any evidence of bleeding . Denies NSAID use but home medications on discharge included Ibuprofen and po Diclofenac, which are discontinued.     Patient noted that last few weeks he almost doubled the amount of beer he drinks daily to two \"large\" cans     Leaving AMA   BID Pantoprazole 40 mg BID + Sucralfate 1 g QID x 7 days + PRN BID 10 mg Pepcid   Counseled to avoid alcohol, NSAID os spicy foods           "

## 2024-11-03 NOTE — ASSESSMENT & PLAN NOTE
Suspected due to vomiting and diarrhea  As needed Zofran, IV fluids, magnesium and potassium repletion

## 2024-11-03 NOTE — ASSESSMENT & PLAN NOTE
Urine drug screen positive for marijuana and opiates, blood ethanol elevated on admission    Patient confirms drinking at least 2 large cans of beer daily bigger in size than what he used to drink in the past and that he uses marijuana, but he denies any opioids use prior to admission noting that he received in the emergency room on arrival before testing    Counseled on the complication risks of the drugs noted above and alcohol advised on quitting especially with his liver cirrhosis and now with ascites, patient expressed understanding of the seriousness of his situation however he is not willing to take actual steps to quit at the moment and refusing to go to inpatient drug rehab,  met with the patient and provided information to contact if he changes his mind.  He insisted on leaving AMA while he is deemed fully alert and oriented and competent and his wife at bedside as well and she seemed in agreement with his leaving.

## 2024-11-04 ENCOUNTER — TRANSITIONAL CARE MANAGEMENT (OUTPATIENT)
Dept: FAMILY MEDICINE CLINIC | Facility: CLINIC | Age: 49
End: 2024-11-04

## 2024-11-22 ENCOUNTER — VBI (OUTPATIENT)
Dept: ADMINISTRATIVE | Facility: OTHER | Age: 49
End: 2024-11-22

## 2024-11-22 NOTE — TELEPHONE ENCOUNTER
11/22/24 9:04 AM     Chart reviewed for Diabetic Eye Exam was/were not submitted to the patient's insurance.     Marleny Masterson MA   [facility]

## 2024-12-02 ENCOUNTER — OFFICE VISIT (OUTPATIENT)
Dept: GASTROENTEROLOGY | Facility: CLINIC | Age: 49
End: 2024-12-02
Payer: COMMERCIAL

## 2024-12-02 VITALS
OXYGEN SATURATION: 97 % | HEART RATE: 100 BPM | BODY MASS INDEX: 29.18 KG/M2 | TEMPERATURE: 98.1 F | DIASTOLIC BLOOD PRESSURE: 88 MMHG | WEIGHT: 197 LBS | HEIGHT: 69 IN | SYSTOLIC BLOOD PRESSURE: 133 MMHG

## 2024-12-02 DIAGNOSIS — R18.8 ASCITES: ICD-10-CM

## 2024-12-02 DIAGNOSIS — R07.2 SUBSTERNAL CHEST PAIN: ICD-10-CM

## 2024-12-02 DIAGNOSIS — B19.20 HEPATITIS C VIRUS INFECTION WITHOUT HEPATIC COMA, UNSPECIFIED CHRONICITY: ICD-10-CM

## 2024-12-02 DIAGNOSIS — K70.30 ALCOHOLIC CIRRHOSIS OF LIVER WITHOUT ASCITES (HCC): ICD-10-CM

## 2024-12-02 DIAGNOSIS — K21.9 GASTROESOPHAGEAL REFLUX DISEASE WITHOUT ESOPHAGITIS: ICD-10-CM

## 2024-12-02 DIAGNOSIS — R10.9 ABDOMINAL PAIN, UNSPECIFIED ABDOMINAL LOCATION: ICD-10-CM

## 2024-12-02 DIAGNOSIS — R19.7 DIARRHEA, UNSPECIFIED TYPE: Primary | ICD-10-CM

## 2024-12-02 DIAGNOSIS — R63.4 UNINTENTIONAL WEIGHT LOSS: ICD-10-CM

## 2024-12-02 PROCEDURE — 99214 OFFICE O/P EST MOD 30 MIN: CPT | Performed by: INTERNAL MEDICINE

## 2024-12-02 RX ORDER — ONDANSETRON 4 MG/1
4 TABLET, FILM COATED ORAL EVERY 8 HOURS PRN
Qty: 20 TABLET | Refills: 0 | Status: SHIPPED | OUTPATIENT
Start: 2024-12-02

## 2024-12-02 RX ORDER — SODIUM CHLORIDE, SODIUM LACTATE, POTASSIUM CHLORIDE, CALCIUM CHLORIDE 600; 310; 30; 20 MG/100ML; MG/100ML; MG/100ML; MG/100ML
125 INJECTION, SOLUTION INTRAVENOUS CONTINUOUS
OUTPATIENT
Start: 2024-12-02

## 2024-12-02 RX ORDER — SPIRONOLACTONE 50 MG/1
50 TABLET, FILM COATED ORAL DAILY
Qty: 30 TABLET | Refills: 3 | Status: SHIPPED | OUTPATIENT
Start: 2024-12-02

## 2024-12-02 RX ORDER — OMEPRAZOLE 40 MG/1
40 CAPSULE, DELAYED RELEASE ORAL 2 TIMES DAILY
Qty: 60 CAPSULE | Refills: 3 | Status: SHIPPED | OUTPATIENT
Start: 2024-12-02 | End: 2024-12-09

## 2024-12-02 RX ORDER — DICYCLOMINE HYDROCHLORIDE 10 MG/1
10 CAPSULE ORAL 4 TIMES DAILY PRN
Qty: 120 CAPSULE | Refills: 0 | Status: SHIPPED | OUTPATIENT
Start: 2024-12-02

## 2024-12-02 NOTE — PATIENT INSTRUCTIONS
Scheduled date of EGD/colonoscopy (as of today):12/16/24  Physician performing EGD/colonoscopy:Wyatt  Location of EGD/colonoscopy:[facility]  Desired bowel prep reviewed with patient:miralax/dulcolax  Instructions reviewed with patient by:Abbi MASON  Clearances:   none
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
## 2024-12-07 ENCOUNTER — APPOINTMENT (EMERGENCY)
Dept: RADIOLOGY | Facility: HOSPITAL | Age: 49
DRG: 280 | End: 2024-12-07
Payer: COMMERCIAL

## 2024-12-07 ENCOUNTER — APPOINTMENT (EMERGENCY)
Dept: CT IMAGING | Facility: HOSPITAL | Age: 49
DRG: 280 | End: 2024-12-07
Payer: COMMERCIAL

## 2024-12-07 ENCOUNTER — HOSPITAL ENCOUNTER (INPATIENT)
Facility: HOSPITAL | Age: 49
LOS: 1 days | Discharge: LEFT BEFORE DISCHARGE | DRG: 280 | End: 2024-12-09
Attending: EMERGENCY MEDICINE | Admitting: FAMILY MEDICINE
Payer: COMMERCIAL

## 2024-12-07 DIAGNOSIS — K70.30 ALCOHOLIC CIRRHOSIS (HCC): ICD-10-CM

## 2024-12-07 DIAGNOSIS — R10.9 ABDOMINAL PAIN: Primary | ICD-10-CM

## 2024-12-07 DIAGNOSIS — F10.939 ALCOHOL WITHDRAWAL (HCC): ICD-10-CM

## 2024-12-07 DIAGNOSIS — R11.2 NAUSEA AND VOMITING: ICD-10-CM

## 2024-12-07 DIAGNOSIS — R07.9 CHEST PAIN: ICD-10-CM

## 2024-12-07 DIAGNOSIS — R11.2 INTRACTABLE NAUSEA AND VOMITING: ICD-10-CM

## 2024-12-07 LAB
2HR DELTA HS TROPONIN: 0 NG/L
ALBUMIN SERPL BCG-MCNC: 2.6 G/DL (ref 3.5–5)
ALP SERPL-CCNC: 89 U/L (ref 34–104)
ALT SERPL W P-5'-P-CCNC: 24 U/L (ref 7–52)
AMMONIA PLAS-SCNC: 73 UMOL/L (ref 18–72)
ANION GAP SERPL CALCULATED.3IONS-SCNC: 7 MMOL/L (ref 4–13)
APTT PPP: 44 SECONDS (ref 23–34)
AST SERPL W P-5'-P-CCNC: 83 U/L (ref 13–39)
BASOPHILS # BLD AUTO: 0.04 THOUSANDS/ÂΜL (ref 0–0.1)
BASOPHILS NFR BLD AUTO: 1 % (ref 0–1)
BILIRUB SERPL-MCNC: 5.13 MG/DL (ref 0.2–1)
BNP SERPL-MCNC: 12 PG/ML (ref 0–100)
BUN SERPL-MCNC: 4 MG/DL (ref 5–25)
CALCIUM ALBUM COR SERPL-MCNC: 8.7 MG/DL (ref 8.3–10.1)
CALCIUM SERPL-MCNC: 7.6 MG/DL (ref 8.4–10.2)
CARDIAC TROPONIN I PNL SERPL HS: 5 NG/L (ref ?–50)
CARDIAC TROPONIN I PNL SERPL HS: 5 NG/L (ref ?–50)
CHLORIDE SERPL-SCNC: 104 MMOL/L (ref 96–108)
CO2 SERPL-SCNC: 24 MMOL/L (ref 21–32)
CREAT SERPL-MCNC: 0.53 MG/DL (ref 0.6–1.3)
EOSINOPHIL # BLD AUTO: 0.05 THOUSAND/ÂΜL (ref 0–0.61)
EOSINOPHIL NFR BLD AUTO: 1 % (ref 0–6)
ERYTHROCYTE [DISTWIDTH] IN BLOOD BY AUTOMATED COUNT: 16.4 % (ref 11.6–15.1)
ETHANOL SERPL-MCNC: 304 MG/DL
FLUAV AG UPPER RESP QL IA.RAPID: NEGATIVE
FLUBV AG UPPER RESP QL IA.RAPID: NEGATIVE
GFR SERPL CREATININE-BSD FRML MDRD: 123 ML/MIN/1.73SQ M
GLUCOSE SERPL-MCNC: 172 MG/DL (ref 65–140)
HCT VFR BLD AUTO: 34.3 % (ref 36.5–49.3)
HGB BLD-MCNC: 11.8 G/DL (ref 12–17)
IMM GRANULOCYTES # BLD AUTO: 0.01 THOUSAND/UL (ref 0–0.2)
IMM GRANULOCYTES NFR BLD AUTO: 0 % (ref 0–2)
INR PPP: 2.56 (ref 0.85–1.19)
LIPASE SERPL-CCNC: 128 U/L (ref 11–82)
LYMPHOCYTES # BLD AUTO: 0.87 THOUSANDS/ÂΜL (ref 0.6–4.47)
LYMPHOCYTES NFR BLD AUTO: 20 % (ref 14–44)
MCH RBC QN AUTO: 30.8 PG (ref 26.8–34.3)
MCHC RBC AUTO-ENTMCNC: 34.4 G/DL (ref 31.4–37.4)
MCV RBC AUTO: 90 FL (ref 82–98)
MONOCYTES # BLD AUTO: 0.63 THOUSAND/ÂΜL (ref 0.17–1.22)
MONOCYTES NFR BLD AUTO: 15 % (ref 4–12)
NEUTROPHILS # BLD AUTO: 2.66 THOUSANDS/ÂΜL (ref 1.85–7.62)
NEUTS SEG NFR BLD AUTO: 63 % (ref 43–75)
NRBC BLD AUTO-RTO: 0 /100 WBCS
PLATELET # BLD AUTO: 70 THOUSANDS/UL (ref 149–390)
PMV BLD AUTO: 9.9 FL (ref 8.9–12.7)
POTASSIUM SERPL-SCNC: 3.2 MMOL/L (ref 3.5–5.3)
PROT SERPL-MCNC: 6.9 G/DL (ref 6.4–8.4)
PROTHROMBIN TIME: 28.2 SECONDS (ref 12.3–15)
RBC # BLD AUTO: 3.83 MILLION/UL (ref 3.88–5.62)
SARS-COV+SARS-COV-2 AG RESP QL IA.RAPID: NEGATIVE
SODIUM SERPL-SCNC: 135 MMOL/L (ref 135–147)
WBC # BLD AUTO: 4.26 THOUSAND/UL (ref 4.31–10.16)

## 2024-12-07 PROCEDURE — 80053 COMPREHEN METABOLIC PANEL: CPT

## 2024-12-07 PROCEDURE — 71260 CT THORAX DX C+: CPT

## 2024-12-07 PROCEDURE — 87811 SARS-COV-2 COVID19 W/OPTIC: CPT

## 2024-12-07 PROCEDURE — 96372 THER/PROPH/DIAG INJ SC/IM: CPT

## 2024-12-07 PROCEDURE — 93005 ELECTROCARDIOGRAM TRACING: CPT

## 2024-12-07 PROCEDURE — 84484 ASSAY OF TROPONIN QUANT: CPT

## 2024-12-07 PROCEDURE — 83735 ASSAY OF MAGNESIUM: CPT | Performed by: PHYSICIAN ASSISTANT

## 2024-12-07 PROCEDURE — 99285 EMERGENCY DEPT VISIT HI MDM: CPT

## 2024-12-07 PROCEDURE — 71046 X-RAY EXAM CHEST 2 VIEWS: CPT

## 2024-12-07 PROCEDURE — 96374 THER/PROPH/DIAG INJ IV PUSH: CPT

## 2024-12-07 PROCEDURE — 83690 ASSAY OF LIPASE: CPT

## 2024-12-07 PROCEDURE — 74177 CT ABD & PELVIS W/CONTRAST: CPT

## 2024-12-07 PROCEDURE — 96375 TX/PRO/DX INJ NEW DRUG ADDON: CPT

## 2024-12-07 PROCEDURE — 82140 ASSAY OF AMMONIA: CPT

## 2024-12-07 PROCEDURE — 96361 HYDRATE IV INFUSION ADD-ON: CPT

## 2024-12-07 PROCEDURE — 85730 THROMBOPLASTIN TIME PARTIAL: CPT

## 2024-12-07 PROCEDURE — 85610 PROTHROMBIN TIME: CPT

## 2024-12-07 PROCEDURE — 36415 COLL VENOUS BLD VENIPUNCTURE: CPT

## 2024-12-07 PROCEDURE — 82077 ASSAY SPEC XCP UR&BREATH IA: CPT

## 2024-12-07 PROCEDURE — 85025 COMPLETE CBC W/AUTO DIFF WBC: CPT

## 2024-12-07 PROCEDURE — 87804 INFLUENZA ASSAY W/OPTIC: CPT

## 2024-12-07 PROCEDURE — 83880 ASSAY OF NATRIURETIC PEPTIDE: CPT

## 2024-12-07 RX ORDER — LANOLIN ALCOHOL/MO/W.PET/CERES
100 CREAM (GRAM) TOPICAL ONCE
Status: COMPLETED | OUTPATIENT
Start: 2024-12-07 | End: 2024-12-07

## 2024-12-07 RX ORDER — POTASSIUM CHLORIDE 1500 MG/1
40 TABLET, EXTENDED RELEASE ORAL ONCE
Status: COMPLETED | OUTPATIENT
Start: 2024-12-07 | End: 2024-12-07

## 2024-12-07 RX ORDER — FAMOTIDINE 10 MG/ML
20 INJECTION, SOLUTION INTRAVENOUS ONCE
Status: COMPLETED | OUTPATIENT
Start: 2024-12-07 | End: 2024-12-07

## 2024-12-07 RX ORDER — LORAZEPAM 2 MG/ML
2 INJECTION INTRAMUSCULAR ONCE
Status: COMPLETED | OUTPATIENT
Start: 2024-12-07 | End: 2024-12-07

## 2024-12-07 RX ORDER — DIAZEPAM 10 MG/2ML
10 INJECTION, SOLUTION INTRAMUSCULAR; INTRAVENOUS ONCE
Status: COMPLETED | OUTPATIENT
Start: 2024-12-07 | End: 2024-12-07

## 2024-12-07 RX ORDER — FOLIC ACID 1 MG/1
1 TABLET ORAL ONCE
Status: COMPLETED | OUTPATIENT
Start: 2024-12-07 | End: 2024-12-07

## 2024-12-07 RX ORDER — FENTANYL CITRATE 50 UG/ML
50 INJECTION, SOLUTION INTRAMUSCULAR; INTRAVENOUS ONCE
Refills: 0 | Status: DISCONTINUED | OUTPATIENT
Start: 2024-12-07 | End: 2024-12-08

## 2024-12-07 RX ORDER — LORAZEPAM 2 MG/ML
4 INJECTION INTRAMUSCULAR ONCE
Status: CANCELLED | OUTPATIENT
Start: 2024-12-07 | End: 2024-12-07

## 2024-12-07 RX ORDER — LORAZEPAM 2 MG/ML
4 INJECTION INTRAMUSCULAR ONCE
Status: DISCONTINUED | OUTPATIENT
Start: 2024-12-07 | End: 2024-12-07

## 2024-12-07 RX ORDER — PANTOPRAZOLE SODIUM 40 MG/10ML
40 INJECTION, POWDER, LYOPHILIZED, FOR SOLUTION INTRAVENOUS ONCE
Status: COMPLETED | OUTPATIENT
Start: 2024-12-07 | End: 2024-12-07

## 2024-12-07 RX ORDER — ONDANSETRON 2 MG/ML
4 INJECTION INTRAMUSCULAR; INTRAVENOUS ONCE
Status: COMPLETED | OUTPATIENT
Start: 2024-12-07 | End: 2024-12-07

## 2024-12-07 RX ADMIN — IOHEXOL 100 ML: 350 INJECTION, SOLUTION INTRAVENOUS at 21:25

## 2024-12-07 RX ADMIN — LORAZEPAM 2 MG: 2 INJECTION INTRAMUSCULAR; INTRAVENOUS at 21:10

## 2024-12-07 RX ADMIN — SODIUM CHLORIDE 1000 ML: 0.9 INJECTION, SOLUTION INTRAVENOUS at 23:18

## 2024-12-07 RX ADMIN — DIAZEPAM 10 MG: 5 INJECTION INTRAMUSCULAR; INTRAVENOUS at 22:13

## 2024-12-07 RX ADMIN — FAMOTIDINE 20 MG: 10 INJECTION, SOLUTION INTRAVENOUS at 20:46

## 2024-12-07 RX ADMIN — Medication 1 TABLET: at 20:42

## 2024-12-07 RX ADMIN — PANTOPRAZOLE SODIUM 40 MG: 40 INJECTION, POWDER, FOR SOLUTION INTRAVENOUS at 20:41

## 2024-12-07 RX ADMIN — FOLIC ACID 1 MG: 1 TABLET ORAL at 20:42

## 2024-12-07 RX ADMIN — THIAMINE HCL TAB 100 MG 100 MG: 100 TAB at 20:42

## 2024-12-07 RX ADMIN — ONDANSETRON 4 MG: 2 INJECTION INTRAMUSCULAR; INTRAVENOUS at 20:42

## 2024-12-07 RX ADMIN — TRIMETHOBENZAMIDE HYDROCHLORIDE 200 MG: 100 INJECTION INTRAMUSCULAR at 23:19

## 2024-12-07 RX ADMIN — POTASSIUM CHLORIDE 40 MEQ: 1500 TABLET, EXTENDED RELEASE ORAL at 22:17

## 2024-12-08 ENCOUNTER — APPOINTMENT (INPATIENT)
Dept: CT IMAGING | Facility: HOSPITAL | Age: 49
DRG: 280 | End: 2024-12-08
Payer: COMMERCIAL

## 2024-12-08 PROBLEM — K70.31 ALCOHOLIC CIRRHOSIS OF LIVER WITH ASCITES (HCC): Status: ACTIVE | Noted: 2023-10-31

## 2024-12-08 PROBLEM — F10.20 ALCOHOL USE DISORDER, SEVERE, DEPENDENCE (HCC): Status: ACTIVE | Noted: 2024-12-08

## 2024-12-08 PROBLEM — E72.20 HYPERAMMONEMIA (HCC): Status: ACTIVE | Noted: 2024-12-08

## 2024-12-08 PROBLEM — R11.2 INTRACTABLE NAUSEA AND VOMITING: Status: ACTIVE | Noted: 2024-12-08

## 2024-12-08 LAB
ANION GAP SERPL CALCULATED.3IONS-SCNC: 6 MMOL/L (ref 4–13)
ATRIAL RATE: 103 BPM
ATRIAL RATE: 112 BPM
BUN SERPL-MCNC: 3 MG/DL (ref 5–25)
CALCIUM SERPL-MCNC: 7.4 MG/DL (ref 8.4–10.2)
CHLORIDE SERPL-SCNC: 107 MMOL/L (ref 96–108)
CO2 SERPL-SCNC: 26 MMOL/L (ref 21–32)
CREAT SERPL-MCNC: 0.56 MG/DL (ref 0.6–1.3)
ERYTHROCYTE [DISTWIDTH] IN BLOOD BY AUTOMATED COUNT: 16.7 % (ref 11.6–15.1)
GFR SERPL CREATININE-BSD FRML MDRD: 120 ML/MIN/1.73SQ M
GLUCOSE SERPL-MCNC: 119 MG/DL (ref 65–140)
HCT VFR BLD AUTO: 34.6 % (ref 36.5–49.3)
HGB BLD-MCNC: 11.7 G/DL (ref 12–17)
MAGNESIUM SERPL-MCNC: 1.6 MG/DL (ref 1.9–2.7)
MCH RBC QN AUTO: 30.5 PG (ref 26.8–34.3)
MCHC RBC AUTO-ENTMCNC: 33.8 G/DL (ref 31.4–37.4)
MCV RBC AUTO: 90 FL (ref 82–98)
P AXIS: 77 DEGREES
P AXIS: 81 DEGREES
PLATELET # BLD AUTO: 63 THOUSANDS/UL (ref 149–390)
PMV BLD AUTO: 10.6 FL (ref 8.9–12.7)
POTASSIUM SERPL-SCNC: 3.6 MMOL/L (ref 3.5–5.3)
PR INTERVAL: 152 MS
PR INTERVAL: 164 MS
QRS AXIS: 108 DEGREES
QRS AXIS: 113 DEGREES
QRSD INTERVAL: 78 MS
QRSD INTERVAL: 94 MS
QT INTERVAL: 334 MS
QT INTERVAL: 386 MS
QTC INTERVAL: 455 MS
QTC INTERVAL: 505 MS
RBC # BLD AUTO: 3.84 MILLION/UL (ref 3.88–5.62)
SODIUM SERPL-SCNC: 139 MMOL/L (ref 135–147)
T WAVE AXIS: -20 DEGREES
T WAVE AXIS: 63 DEGREES
VENTRICULAR RATE: 103 BPM
VENTRICULAR RATE: 112 BPM
WBC # BLD AUTO: 3.86 THOUSAND/UL (ref 4.31–10.16)

## 2024-12-08 PROCEDURE — 93010 ELECTROCARDIOGRAM REPORT: CPT | Performed by: INTERNAL MEDICINE

## 2024-12-08 PROCEDURE — 99222 1ST HOSP IP/OBS MODERATE 55: CPT | Performed by: INTERNAL MEDICINE

## 2024-12-08 PROCEDURE — 99223 1ST HOSP IP/OBS HIGH 75: CPT | Performed by: INTERNAL MEDICINE

## 2024-12-08 PROCEDURE — 80048 BASIC METABOLIC PNL TOTAL CA: CPT | Performed by: PHYSICIAN ASSISTANT

## 2024-12-08 PROCEDURE — 85027 COMPLETE CBC AUTOMATED: CPT | Performed by: PHYSICIAN ASSISTANT

## 2024-12-08 PROCEDURE — 70450 CT HEAD/BRAIN W/O DYE: CPT

## 2024-12-08 RX ORDER — LIDOCAINE 50 MG/G
1 PATCH TOPICAL DAILY PRN
Status: DISCONTINUED | OUTPATIENT
Start: 2024-12-08 | End: 2024-12-09 | Stop reason: HOSPADM

## 2024-12-08 RX ORDER — FOLIC ACID 1 MG/1
1 TABLET ORAL DAILY
Status: DISCONTINUED | OUTPATIENT
Start: 2024-12-08 | End: 2024-12-09 | Stop reason: HOSPADM

## 2024-12-08 RX ORDER — FUROSEMIDE 10 MG/ML
40 INJECTION INTRAMUSCULAR; INTRAVENOUS 2 TIMES DAILY
Status: DISCONTINUED | OUTPATIENT
Start: 2024-12-08 | End: 2024-12-09 | Stop reason: HOSPADM

## 2024-12-08 RX ORDER — DICYCLOMINE HYDROCHLORIDE 10 MG/1
10 CAPSULE ORAL 4 TIMES DAILY PRN
Status: DISCONTINUED | OUTPATIENT
Start: 2024-12-08 | End: 2024-12-09 | Stop reason: HOSPADM

## 2024-12-08 RX ORDER — PANTOPRAZOLE SODIUM 40 MG/10ML
40 INJECTION, POWDER, LYOPHILIZED, FOR SOLUTION INTRAVENOUS EVERY 12 HOURS SCHEDULED
Status: DISCONTINUED | OUTPATIENT
Start: 2024-12-08 | End: 2024-12-09 | Stop reason: HOSPADM

## 2024-12-08 RX ORDER — LANOLIN ALCOHOL/MO/W.PET/CERES
100 CREAM (GRAM) TOPICAL DAILY
Status: DISCONTINUED | OUTPATIENT
Start: 2024-12-08 | End: 2024-12-09 | Stop reason: HOSPADM

## 2024-12-08 RX ORDER — LACTULOSE 10 G/15ML
20 SOLUTION ORAL 2 TIMES DAILY
Status: DISCONTINUED | OUTPATIENT
Start: 2024-12-08 | End: 2024-12-09 | Stop reason: HOSPADM

## 2024-12-08 RX ORDER — CHLORDIAZEPOXIDE HYDROCHLORIDE 25 MG/1
25 CAPSULE, GELATIN COATED ORAL EVERY 12 HOURS SCHEDULED
Status: DISCONTINUED | OUTPATIENT
Start: 2024-12-10 | End: 2024-12-09 | Stop reason: HOSPADM

## 2024-12-08 RX ORDER — FAMOTIDINE 20 MG/1
10 TABLET, FILM COATED ORAL DAILY
Status: DISCONTINUED | OUTPATIENT
Start: 2024-12-08 | End: 2024-12-09 | Stop reason: HOSPADM

## 2024-12-08 RX ORDER — SPIRONOLACTONE 25 MG/1
50 TABLET ORAL DAILY
Status: DISCONTINUED | OUTPATIENT
Start: 2024-12-08 | End: 2024-12-09 | Stop reason: HOSPADM

## 2024-12-08 RX ORDER — SACCHAROMYCES BOULARDII 250 MG
250 CAPSULE ORAL 2 TIMES DAILY
Status: DISCONTINUED | OUTPATIENT
Start: 2024-12-08 | End: 2024-12-09 | Stop reason: HOSPADM

## 2024-12-08 RX ORDER — AMLODIPINE BESYLATE 2.5 MG/1
2.5 TABLET ORAL DAILY
Status: DISCONTINUED | OUTPATIENT
Start: 2024-12-08 | End: 2024-12-09 | Stop reason: HOSPADM

## 2024-12-08 RX ORDER — DIPHENHYDRAMINE HYDROCHLORIDE 50 MG/ML
12.5 INJECTION INTRAMUSCULAR; INTRAVENOUS EVERY 6 HOURS PRN
Status: DISCONTINUED | OUTPATIENT
Start: 2024-12-08 | End: 2024-12-09 | Stop reason: HOSPADM

## 2024-12-08 RX ORDER — SUCRALFATE 1 G/1
1 TABLET ORAL 4 TIMES DAILY
Status: DISCONTINUED | OUTPATIENT
Start: 2024-12-08 | End: 2024-12-09 | Stop reason: HOSPADM

## 2024-12-08 RX ORDER — CHLORDIAZEPOXIDE HYDROCHLORIDE 25 MG/1
25 CAPSULE, GELATIN COATED ORAL
Status: DISCONTINUED | OUTPATIENT
Start: 2024-12-12 | End: 2024-12-09 | Stop reason: HOSPADM

## 2024-12-08 RX ORDER — MAGNESIUM SULFATE HEPTAHYDRATE 40 MG/ML
2 INJECTION, SOLUTION INTRAVENOUS ONCE
Status: COMPLETED | OUTPATIENT
Start: 2024-12-08 | End: 2024-12-08

## 2024-12-08 RX ORDER — CHLORDIAZEPOXIDE HYDROCHLORIDE 25 MG/1
25 CAPSULE, GELATIN COATED ORAL EVERY 8 HOURS SCHEDULED
Status: DISCONTINUED | OUTPATIENT
Start: 2024-12-08 | End: 2024-12-09 | Stop reason: HOSPADM

## 2024-12-08 RX ORDER — MAGNESIUM HYDROXIDE/ALUMINUM HYDROXICE/SIMETHICONE 120; 1200; 1200 MG/30ML; MG/30ML; MG/30ML
30 SUSPENSION ORAL EVERY 6 HOURS PRN
Status: DISCONTINUED | OUTPATIENT
Start: 2024-12-08 | End: 2024-12-09 | Stop reason: HOSPADM

## 2024-12-08 RX ADMIN — LACTULOSE 20 G: 20 SOLUTION ORAL at 17:29

## 2024-12-08 RX ADMIN — B-COMPLEX W/ C & FOLIC ACID TAB 1 TABLET: TAB at 09:06

## 2024-12-08 RX ADMIN — SUCRALFATE 1 G: 1 TABLET ORAL at 17:29

## 2024-12-08 RX ADMIN — LACTULOSE 20 G: 20 SOLUTION ORAL at 01:24

## 2024-12-08 RX ADMIN — PANTOPRAZOLE SODIUM 40 MG: 40 INJECTION, POWDER, FOR SOLUTION INTRAVENOUS at 09:19

## 2024-12-08 RX ADMIN — FAMOTIDINE 10 MG: 20 TABLET, FILM COATED ORAL at 09:06

## 2024-12-08 RX ADMIN — Medication 250 MG: at 17:29

## 2024-12-08 RX ADMIN — THIAMINE HCL TAB 100 MG 100 MG: 100 TAB at 09:06

## 2024-12-08 RX ADMIN — MAGNESIUM SULFATE HEPTAHYDRATE 2 G: 40 INJECTION, SOLUTION INTRAVENOUS at 02:16

## 2024-12-08 RX ADMIN — TRAZODONE HYDROCHLORIDE 150 MG: 100 TABLET ORAL at 01:24

## 2024-12-08 RX ADMIN — FOLIC ACID 1 MG: 1 TABLET ORAL at 09:06

## 2024-12-08 RX ADMIN — FUROSEMIDE 40 MG: 10 INJECTION, SOLUTION INTRAMUSCULAR; INTRAVENOUS at 17:29

## 2024-12-08 RX ADMIN — CHLORDIAZEPOXIDE HYDROCHLORIDE 25 MG: 25 CAPSULE ORAL at 21:12

## 2024-12-08 RX ADMIN — SUCRALFATE 1 G: 1 TABLET ORAL at 14:45

## 2024-12-08 RX ADMIN — FUROSEMIDE 40 MG: 10 INJECTION, SOLUTION INTRAMUSCULAR; INTRAVENOUS at 01:24

## 2024-12-08 RX ADMIN — TRIMETHOBENZAMIDE HYDROCHLORIDE 200 MG: 100 INJECTION INTRAMUSCULAR at 09:23

## 2024-12-08 RX ADMIN — SUCRALFATE 1 G: 1 TABLET ORAL at 01:24

## 2024-12-08 RX ADMIN — SUCRALFATE 1 G: 1 TABLET ORAL at 21:12

## 2024-12-08 RX ADMIN — LACTULOSE 20 G: 20 SOLUTION ORAL at 09:18

## 2024-12-08 RX ADMIN — PANTOPRAZOLE SODIUM 40 MG: 40 INJECTION, POWDER, FOR SOLUTION INTRAVENOUS at 21:12

## 2024-12-08 RX ADMIN — CHLORDIAZEPOXIDE HYDROCHLORIDE 25 MG: 25 CAPSULE ORAL at 14:45

## 2024-12-08 RX ADMIN — Medication 250 MG: at 09:06

## 2024-12-08 RX ADMIN — SUCRALFATE 1 G: 1 TABLET ORAL at 09:18

## 2024-12-08 RX ADMIN — CHLORDIAZEPOXIDE HYDROCHLORIDE 25 MG: 25 CAPSULE ORAL at 09:17

## 2024-12-08 RX ADMIN — TRAZODONE HYDROCHLORIDE 150 MG: 100 TABLET ORAL at 21:11

## 2024-12-08 NOTE — INCIDENTAL FINDINGS
"The following findings require follow up:  Radiographic finding   Finding: \"Redemonstrated multiple subcentimeter hepatic hypodensities, too small to characterize. Recommend nonemergent MRI of the abdomen with gadolinium contrast for further evaluation. \"     Follow up required: Outpatient with gastroenterology   Follow up should be done within 1 week(s)    Please notify the following clinician to assist with the follow up:   Gastroenterology [facility]    "

## 2024-12-08 NOTE — ASSESSMENT & PLAN NOTE
In the setting of ascites with alcoholic cirrhosis and alcohol use  As needed Tigan, Benadryl for nausea vomiting (QTc prolonged at 505)  Start Protonix, Pepcid, sucralfate

## 2024-12-08 NOTE — ASSESSMENT & PLAN NOTE
"Reports multiple falls at home within the last month and does report at times he does \"pass out\"  Reports he had a syncopal episode yesterday but denies any head injury  Will obtain CT head  Encouraged alcohol cessation  "

## 2024-12-08 NOTE — ASSESSMENT & PLAN NOTE
Platelets currently 70,000 which appears to be around baseline of 60-70,000  No active bleeding noted  Monitor CBC  Hold VTE prophylaxis

## 2024-12-08 NOTE — PLAN OF CARE
Problem: PAIN - ADULT  Goal: Verbalizes/displays adequate comfort level or baseline comfort level  Description: Interventions:  - Encourage patient to monitor pain and request assistance  - Assess pain using appropriate pain scale  - Administer analgesics based on type and severity of pain and evaluate response  - Implement non-pharmacological measures as appropriate and evaluate response  - Consider cultural and social influences on pain and pain management  - Notify physician/advanced practitioner if interventions unsuccessful or patient reports new pain  Outcome: Progressing     Problem: Nutrition/Hydration-ADULT  Goal: Nutrient/Hydration intake appropriate for improving, restoring or maintaining nutritional needs  Description: Monitor and assess patient's nutrition/hydration status for malnutrition. Collaborate with interdisciplinary team and initiate plan and interventions as ordered.  Monitor patient's weight and dietary intake as ordered or per policy. Utilize nutrition screening tool and intervene as necessary. Determine patient's food preferences and provide high-protein, high-caloric foods as appropriate.     INTERVENTIONS:  - Monitor oral intake, urinary output, labs, and treatment plans  - Assess nutrition and hydration status and recommend course of action  - Evaluate amount of meals eaten  - Assist patient with eating if necessary   - Allow adequate time for meals  - Recommend/ encourage appropriate diets, oral nutritional supplements, and vitamin/mineral supplements  - Order, calculate, and assess calorie counts as needed  - Recommend, monitor, and adjust tube feedings and TPN/PPN based on assessed needs  - Assess need for intravenous fluids  - Provide specific nutrition/hydration education as appropriate  - Include patient/family/caregiver in decisions related to nutrition  Outcome: Progressing     Problem: GASTROINTESTINAL - ADULT  Goal: Minimal or absence of nausea and/or vomiting  Description:  INTERVENTIONS:  - Administer IV fluids if ordered to ensure adequate hydration  - Maintain NPO status until nausea and vomiting are resolved  - Nasogastric tube if ordered  - Administer ordered antiemetic medications as needed  - Provide nonpharmacologic comfort measures as appropriate  - Advance diet as tolerated, if ordered  - Consider nutrition services referral to assist patient with adequate nutrition and appropriate food choices  Outcome: Progressing     Problem: METABOLIC, FLUID AND ELECTROLYTES - ADULT  Goal: Electrolytes maintained within normal limits  Description: INTERVENTIONS:  - Monitor labs and assess patient for signs and symptoms of electrolyte imbalances  - Administer electrolyte replacement as ordered  - Monitor response to electrolyte replacements, including repeat lab results as appropriate  - Instruct patient on fluid and nutrition as appropriate  Outcome: Progressing  Goal: Fluid balance maintained  Description: INTERVENTIONS:  - Monitor labs   - Monitor I/O and WT  - Instruct patient on fluid and nutrition as appropriate  - Assess for signs & symptoms of volume excess or deficit  Outcome: Progressing

## 2024-12-08 NOTE — ED NOTES
Pt noted to have PVCs on cardiac monitor. Attending made aware, new EKG printed. Pt denies CP or SOB at this time.      Francia Paredes RN  12/08/24 0010

## 2024-12-08 NOTE — ED PROVIDER NOTES
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 substitute with appropriate alternative as covered by patient's insurance. Dx: E11.65  Qty: 200 each, Refills: 3    Associated Diagnoses: Type 2 diabetes mellitus without complication, without long-term current use of insulin (HCC)      pantoprazole (PROTONIX) 40 mg tablet Take 1 tablet (40 mg total) by mouth 2 (two) times a day  Qty: 120 tablet, Refills: 0    Associated Diagnoses: Alcoholic cirrhosis of liver without ascites (HCC)      Probiotic Product (PROBIOTIC DAILY PO) Take by mouth      spironolactone (ALDACTONE) 50 mg tablet Take 1 tablet (50 mg total) by mouth daily  Qty: 30 tablet, Refills: 3    Associated Diagnoses: Ascites      sucralfate (CARAFATE) 1 g tablet Take 1 tablet (1 g total) by mouth 4 (four) times a day for 7 days  Qty: 28 tablet, Refills: 0    Associated Diagnoses: Alcoholic cirrhosis of liver without ascites (HCC); Enteritis; Acute alcoholic gastritis without hemorrhage      Thiamine Mononitrate (VITAMIN B1) 100 mg tablet Take 1 tablet (100 mg total) by mouth daily  Qty: 14 tablet, Refills: 0    Associated Diagnoses: Alcoholic cirrhosis of liver without ascites (HCC)      traZODone (DESYREL) 150 mg tablet Take 150 mg by mouth daily at bedtime  Refills: 0       !! - Potential duplicate medications found. Please discuss with provider.        No discharge procedures on  file.  ED SEPSIS DOCUMENTATION   Time reflects when diagnosis was documented in both MDM as applicable and the Disposition within this note       Time User Action Codes Description Comment    12/8/2024 12:01 AM Tana Nolasco [R10.9] Abdominal pain     12/8/2024 12:01 AM Tana Nolasco [R07.9] Chest pain     12/8/2024 12:01 AM Tana Nolasco [R11.2] Nausea and vomiting     12/8/2024 12:01 AM Tana Nolasco [K70.30] Alcoholic cirrhosis (HCC)     12/8/2024 12:02 AM Tana Nolasco [F10.939] Alcohol withdrawal (HCC)                  Tana Nolasco PA-C  12/08/24 0548
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
## 2024-12-08 NOTE — ASSESSMENT & PLAN NOTE
Continues to drink daily despite currently undergoing drug and alcohol rehab at [facility] and multiple providers expressing cessation  Drinks between 2 and 6 beers daily, last drink was 6 PM tonight  Will start Librium taper for withdrawal symptoms  [facility] protocol  Multivitamin, thiamine, folic acid daily  Case management consult to provide resources for drug and alcohol rehab, educated patient on alcohol cessation and how continued alcohol use is causing symptoms to become worse

## 2024-12-08 NOTE — PLAN OF CARE
Problem: GASTROINTESTINAL - ADULT  Goal: Minimal or absence of nausea and/or vomiting  Description: INTERVENTIONS:  - Administer IV fluids if ordered to ensure adequate hydration  -   Problem: PAIN - ADULT  Goal: Verbalizes/displays adequate comfort level or baseline comfort level  Description: Interventions:  - Encourage patient to monitor pain and request assistance  - Assess pain using appropriate pain scale  - Administer analgesics based on type and severity of pain and evaluate response  - Implement non-pharmacological measures as appropriate and evaluate response  - Consider cultural and social influences on pain and pain management  - Notify physician/advanced practitioner if interventions unsuccessful or patient reports new pain  Outcome: Progressing   - Administer ordered antiemetic medications as needed  - Provide nonpharmacologic comfort measures as appropriate  - Advance diet as tolerated, if ordered  - Consider nutrition services referral to assist patient with adequate nutrition and appropriate food choices  Outcome: Progressing     Problem: METABOLIC, FLUID AND ELECTROLYTES - ADULT  Goal: Electrolytes maintained within normal limits  Description: INTERVENTIONS:  - Monitor labs and assess patient for signs and symptoms of electrolyte imbalances  - Administer electrolyte replacement as ordered  - Monitor response to electrolyte replacements, including repeat lab results as appropriate  - Instruct patient on fluid and nutrition as appropriate  Outcome: Progressing  Goal: Fluid balance maintained  Description: INTERVENTIONS:  - Monitor labs   - Monitor I/O and WT  - Instruct patient on fluid and nutrition as appropriate  - Assess for signs & symptoms of volume excess or deficit  Outcome: Progressing

## 2024-12-08 NOTE — CASE MANAGEMENT
Case Management Assessment & Discharge Planning Note    Patient name Robert Combs  Location /-01 MRN [id number]  : 1975 Date 2024       Current Admission Date: 2024  Current Admission Diagnosis:Intractable nausea and vomiting   Patient Active Problem List    Diagnosis Date Noted Date Diagnosed    Alcohol use disorder, severe, dependence (HCC) 2024     Intractable nausea and vomiting 2024     Hyperammonemia (HCC) 2024     Polysubstance abuse (HCC) 2024     Acute alcoholic gastritis without hemorrhage 2024     Hypokalemia 2024     Pancytopenia (HCC) 2024     Enteritis 2024     Alcohol intoxication with delirium (HCC) 2024     Thrombocytopenia (HCC) 2024     Suicidal behavior with attempted self-injury (HCC) 2024     Ambulatory dysfunction 2024     Abnormal CT of the abdomen 2024     Alcoholic cirrhosis of liver with ascites (HCC) 10/31/2023     Gallstones 10/31/2023     Incarcerated umbilical hernia 10/25/2023     Left against medical advice 2019     Hepatitis C virus infection without hepatic coma 2019     Chronic bilateral low back pain with bilateral sciatica 2019     Alcohol abuse, daily use 2019     Marijuana use 2019     Depression with anxiety 2019       LOS (days): 0  Geometric Mean LOS (GMLOS) (days):   Days to GMLOS:     OBJECTIVE:    Risk of Unplanned Readmission Score: 32.95         Current admission status: Inpatient       Preferred Pharmacy:   [facility]/pharmacy #[id number] - [place] - [address]  [address]  [place] [address]  Phone: [phone] Fax: [phone]    Primary Care Provider: Yolanda Billy DO    Primary Insurance: DANGELO CANDELARIO  Secondary Insurance:     ASSESSMENT:  Active Health Care Proxies    There are no active Health Care Proxies on file.       Advance Directives  Does patient have a Health Care POA?: No  Was patient offered paperwork?:  Yes (declined)  Does patient currently have a Health Care decision maker?: Yes, please see Health Care Proxy section  Does patient have Advance Directives?: No  Was patient offered paperwork?: Yes (declined)  Primary Contact: Kamala         Readmission Root Cause  30 Day Readmission: No    Patient Information  Admitted from:: Home  Mental Status: Alert  During Assessment patient was accompanied by: Not accompanied during assessment  Assessment information provided by:: Patient  Primary Caregiver: Self  Support Systems: Spouse/significant other  County of Residence: [place]  What city do you live in?: [place]  Home entry access options. Select all that apply.: Stairs  Number of steps to enter home.: 3  Do the steps have railings?: Yes  Type of Current Residence: 2 story home  Upon entering residence, is there a bedroom on the main floor (no further steps)?: No  A bedroom is located on the following floor levels of residence (select all that apply):: 2nd Floor  Upon entering residence, is there a bathroom on the main floor (no further steps)?: Yes  Number of steps to 2nd floor from main floor: One Flight  Living Arrangements: Lives w/ Spouse/significant other (lives with Kamala)  Is patient a ?: No    Activities of Daily Living Prior to Admission  Functional Status: Independent  Completes ADLs independently?: Yes  Ambulates independently?: Yes  Does patient use assisted devices?: No  Does patient currently own DME?: No  Does patient have a history of Outpatient Therapy (PT/OT)?: No  Does the patient have a history of Short-Term Rehab?: Yes (For substance abuse)  Does patient have a history of HHC?: No  Does patient currently have HHC?: No         Patient Information Continued  Income Source: Unemployed  Does patient have prescription coverage?: Yes  Does patient receive dialysis treatments?: No  Does patient have a history of substance abuse?: Yes  Historical substance use preference: Alcohol/ETOH,  "\"Crack\" cocaine, Heroin  History of Withdrawal Symptoms: Delirium tremors  Is patient currently in treatment for substance abuse?: Yes (Pt has been in numerous substance abuse facilities and is current with [facility].  Pt declines OP resources.  States he was given this on his last admission.)  Does patient have a history of Mental Health Diagnosis?: Yes (Pt is treated by psychiatrist Dr. Stein for depression, anxiwty and insomnia)  Is patient receiving treatment for mental health?: Yes  Has patient received inpatient treatment related to mental health in the last 2 years?: No         Means of Transportation  Means of Transport to Appts:: Family transport          DISCHARGE DETAILS:    Discharge planning discussed with:: patient  Freedom of Choice: Yes  Comments - Freedom of Choice: Pt admits to having issues with alcohol abuse and also has a hx of heroin and cocaine abuse, but states he has stopped using drugs.  Admits to drinking approx 6 beers a day.  CM offered in-patient and out-patient resources, but pt declines this offer.  States that he already received resources at his last hospitalization and is current with [facility].  CM contacted family/caregiver?: No- see comments (per pt request)  Were Treatment Team discharge recommendations reviewed with patient/caregiver?: Yes  Did patient/caregiver verbalize understanding of patient care needs?: Yes  Were patient/caregiver advised of the risks associated with not following Treatment Team discharge recommendations?: Yes    Contacts  Patient Contacts: Victoria  Relationship to Patient:: Family    Requested Home Health Care         Is the patient interested in HHC at discharge?: No    DME Referral Provided  Referral made for DME?: No    Other Referral/Resources/Interventions Provided:  Referral Comments: Declines resources for substance abuse disorder    Would you like to participate in our [facility] service program?  : No - Declined    Treatment Team " Recommendation: Home  Discharge Destination Plan:: Home  Transport at Discharge : Family

## 2024-12-08 NOTE — ED NOTES
Pt CIWA score 18. Per CIWA protocol, pt is to receive 4mg of Ativan IV. Provider Mayi MONGE PA-C again, is not comfortable with administering 4mg of Ativan, instead ordered 10mg Valium IV.      Francia Paredes RN  12/07/24 0264
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
## 2024-12-08 NOTE — ASSESSMENT & PLAN NOTE
CT chest abdomen pelvis revealing cirrhotic liver with ascites and portal hypertension with associated varices and splenomegaly  Feel intensity of nausea vomiting and abdominal pain in the setting of worsening ascites with alcoholic cirrhosis and patient continuing to drink alcohol daily  MELD score 21  Patient reports being scheduled 12/16 for colonoscopy and EGD, will consult GI to determine inpatient versus outpatient follow-up for these tests  Positive fluid wave and noted ascites on CT abdomen pelvis so will start IV Lasix 40 mg twice daily  Continue spironolactone 50 mg daily, low-sodium diet

## 2024-12-08 NOTE — ASSESSMENT & PLAN NOTE
Ammonia elevated at 73  Patient reports noncompliance with lactulose, will restart lactulose 20 g twice daily

## 2024-12-08 NOTE — ASSESSMENT & PLAN NOTE
CT chest abdomen pelvis revealing multiple hepatic hypodensities, unspecified  Recommendation to follow-up outpatient with GI for MRI abdomen with contrast

## 2024-12-08 NOTE — ED NOTES
Provider Mayi TRACY PA-C, states that she is not comfortable giving 4mg of Ativan per [facility] protocol, and has changed the order to 2mg.      Francia Paredes RN  12/07/24 [id number]

## 2024-12-09 ENCOUNTER — ANESTHESIA (INPATIENT)
Dept: GASTROENTEROLOGY | Facility: HOSPITAL | Age: 49
DRG: 280 | End: 2024-12-09
Payer: COMMERCIAL

## 2024-12-09 ENCOUNTER — ANESTHESIA EVENT (INPATIENT)
Dept: GASTROENTEROLOGY | Facility: HOSPITAL | Age: 49
DRG: 280 | End: 2024-12-09
Payer: COMMERCIAL

## 2024-12-09 ENCOUNTER — APPOINTMENT (INPATIENT)
Dept: GASTROENTEROLOGY | Facility: HOSPITAL | Age: 49
DRG: 280 | End: 2024-12-09
Payer: COMMERCIAL

## 2024-12-09 VITALS
SYSTOLIC BLOOD PRESSURE: 116 MMHG | TEMPERATURE: 97 F | HEART RATE: 91 BPM | DIASTOLIC BLOOD PRESSURE: 68 MMHG | OXYGEN SATURATION: 95 % | WEIGHT: 193 LBS | HEIGHT: 69 IN | RESPIRATION RATE: 20 BRPM | BODY MASS INDEX: 28.58 KG/M2

## 2024-12-09 LAB
ALBUMIN SERPL BCG-MCNC: 2.5 G/DL (ref 3.5–5)
ALP SERPL-CCNC: 78 U/L (ref 34–104)
ALT SERPL W P-5'-P-CCNC: 22 U/L (ref 7–52)
ANION GAP SERPL CALCULATED.3IONS-SCNC: 6 MMOL/L (ref 4–13)
AST SERPL W P-5'-P-CCNC: 87 U/L (ref 13–39)
BASOPHILS # BLD AUTO: 0.03 THOUSANDS/ÂΜL (ref 0–0.1)
BASOPHILS NFR BLD AUTO: 1 % (ref 0–1)
BILIRUB SERPL-MCNC: 7.21 MG/DL (ref 0.2–1)
BUN SERPL-MCNC: 6 MG/DL (ref 5–25)
CALCIUM ALBUM COR SERPL-MCNC: 9 MG/DL (ref 8.3–10.1)
CALCIUM SERPL-MCNC: 7.8 MG/DL (ref 8.4–10.2)
CHLORIDE SERPL-SCNC: 103 MMOL/L (ref 96–108)
CO2 SERPL-SCNC: 24 MMOL/L (ref 21–32)
CREAT SERPL-MCNC: 0.65 MG/DL (ref 0.6–1.3)
EOSINOPHIL # BLD AUTO: 0.04 THOUSAND/ÂΜL (ref 0–0.61)
EOSINOPHIL NFR BLD AUTO: 1 % (ref 0–6)
ERYTHROCYTE [DISTWIDTH] IN BLOOD BY AUTOMATED COUNT: 16.8 % (ref 11.6–15.1)
GFR SERPL CREATININE-BSD FRML MDRD: 113 ML/MIN/1.73SQ M
GLUCOSE SERPL-MCNC: 87 MG/DL (ref 65–140)
HCT VFR BLD AUTO: 33 % (ref 36.5–49.3)
HGB BLD-MCNC: 11.1 G/DL (ref 12–17)
IMM GRANULOCYTES # BLD AUTO: 0.04 THOUSAND/UL (ref 0–0.2)
IMM GRANULOCYTES NFR BLD AUTO: 1 % (ref 0–2)
LYMPHOCYTES # BLD AUTO: 0.64 THOUSANDS/ÂΜL (ref 0.6–4.47)
LYMPHOCYTES NFR BLD AUTO: 20 % (ref 14–44)
MAGNESIUM SERPL-MCNC: 1.5 MG/DL (ref 1.9–2.7)
MCH RBC QN AUTO: 30.2 PG (ref 26.8–34.3)
MCHC RBC AUTO-ENTMCNC: 33.6 G/DL (ref 31.4–37.4)
MCV RBC AUTO: 90 FL (ref 82–98)
MONOCYTES # BLD AUTO: 0.56 THOUSAND/ÂΜL (ref 0.17–1.22)
MONOCYTES NFR BLD AUTO: 18 % (ref 4–12)
NEUTROPHILS # BLD AUTO: 1.84 THOUSANDS/ÂΜL (ref 1.85–7.62)
NEUTS SEG NFR BLD AUTO: 59 % (ref 43–75)
NRBC BLD AUTO-RTO: 0 /100 WBCS
PLATELET # BLD AUTO: 53 THOUSANDS/UL (ref 149–390)
PMV BLD AUTO: 10.9 FL (ref 8.9–12.7)
POTASSIUM SERPL-SCNC: 3.6 MMOL/L (ref 3.5–5.3)
PROT SERPL-MCNC: 6.4 G/DL (ref 6.4–8.4)
RBC # BLD AUTO: 3.67 MILLION/UL (ref 3.88–5.62)
SODIUM SERPL-SCNC: 133 MMOL/L (ref 135–147)
WBC # BLD AUTO: 3.15 THOUSAND/UL (ref 4.31–10.16)

## 2024-12-09 PROCEDURE — 99239 HOSP IP/OBS DSCHRG MGMT >30: CPT | Performed by: INTERNAL MEDICINE

## 2024-12-09 PROCEDURE — 83735 ASSAY OF MAGNESIUM: CPT | Performed by: INTERNAL MEDICINE

## 2024-12-09 PROCEDURE — 85025 COMPLETE CBC W/AUTO DIFF WBC: CPT | Performed by: INTERNAL MEDICINE

## 2024-12-09 PROCEDURE — 0DB68ZX EXCISION OF STOMACH, VIA NATURAL OR ARTIFICIAL OPENING ENDOSCOPIC, DIAGNOSTIC: ICD-10-PCS | Performed by: INTERNAL MEDICINE

## 2024-12-09 PROCEDURE — 43239 EGD BIOPSY SINGLE/MULTIPLE: CPT | Performed by: INTERNAL MEDICINE

## 2024-12-09 PROCEDURE — 88305 TISSUE EXAM BY PATHOLOGIST: CPT | Performed by: PATHOLOGY

## 2024-12-09 PROCEDURE — 80053 COMPREHEN METABOLIC PANEL: CPT | Performed by: INTERNAL MEDICINE

## 2024-12-09 PROCEDURE — 99232 SBSQ HOSP IP/OBS MODERATE 35: CPT | Performed by: INTERNAL MEDICINE

## 2024-12-09 RX ORDER — PROPOFOL 10 MG/ML
INJECTION, EMULSION INTRAVENOUS AS NEEDED
Status: DISCONTINUED | OUTPATIENT
Start: 2024-12-09 | End: 2024-12-09

## 2024-12-09 RX ORDER — MAGNESIUM SULFATE HEPTAHYDRATE 40 MG/ML
4 INJECTION, SOLUTION INTRAVENOUS ONCE
Status: DISCONTINUED | OUTPATIENT
Start: 2024-12-09 | End: 2024-12-09 | Stop reason: HOSPADM

## 2024-12-09 RX ORDER — HYDROMORPHONE HCL IN WATER/PF 6 MG/30 ML
0.2 PATIENT CONTROLLED ANALGESIA SYRINGE INTRAVENOUS EVERY 2 HOUR PRN
Refills: 0 | Status: DISCONTINUED | OUTPATIENT
Start: 2024-12-09 | End: 2024-12-09 | Stop reason: HOSPADM

## 2024-12-09 RX ORDER — SODIUM CHLORIDE, SODIUM LACTATE, POTASSIUM CHLORIDE, CALCIUM CHLORIDE 600; 310; 30; 20 MG/100ML; MG/100ML; MG/100ML; MG/100ML
INJECTION, SOLUTION INTRAVENOUS CONTINUOUS PRN
Status: DISCONTINUED | OUTPATIENT
Start: 2024-12-09 | End: 2024-12-09

## 2024-12-09 RX ORDER — LIDOCAINE 50 MG/G
1 PATCH TOPICAL DAILY
Status: DISCONTINUED | OUTPATIENT
Start: 2024-12-09 | End: 2024-12-09 | Stop reason: HOSPADM

## 2024-12-09 RX ORDER — LIDOCAINE HYDROCHLORIDE 20 MG/ML
INJECTION, SOLUTION EPIDURAL; INFILTRATION; INTRACAUDAL; PERINEURAL AS NEEDED
Status: DISCONTINUED | OUTPATIENT
Start: 2024-12-09 | End: 2024-12-09

## 2024-12-09 RX ADMIN — THIAMINE HCL TAB 100 MG 100 MG: 100 TAB at 08:39

## 2024-12-09 RX ADMIN — PROPOFOL 50 MG: 10 INJECTION, EMULSION INTRAVENOUS at 13:26

## 2024-12-09 RX ADMIN — LIDOCAINE HYDROCHLORIDE 100 MG: 20 INJECTION, SOLUTION EPIDURAL; INFILTRATION; INTRACAUDAL; PERINEURAL at 13:24

## 2024-12-09 RX ADMIN — PROPOFOL 50 MG: 10 INJECTION, EMULSION INTRAVENOUS at 13:25

## 2024-12-09 RX ADMIN — FAMOTIDINE 10 MG: 20 TABLET, FILM COATED ORAL at 08:38

## 2024-12-09 RX ADMIN — PROPOFOL 30 MG: 10 INJECTION, EMULSION INTRAVENOUS at 13:28

## 2024-12-09 RX ADMIN — FOLIC ACID 1 MG: 1 TABLET ORAL at 08:49

## 2024-12-09 RX ADMIN — B-COMPLEX W/ C & FOLIC ACID TAB 1 TABLET: TAB at 08:37

## 2024-12-09 RX ADMIN — SODIUM CHLORIDE, SODIUM LACTATE, POTASSIUM CHLORIDE, AND CALCIUM CHLORIDE: .6; .31; .03; .02 INJECTION, SOLUTION INTRAVENOUS at 13:11

## 2024-12-09 RX ADMIN — CHLORDIAZEPOXIDE HYDROCHLORIDE 25 MG: 25 CAPSULE ORAL at 05:27

## 2024-12-09 RX ADMIN — PROPOFOL 20 MG: 10 INJECTION, EMULSION INTRAVENOUS at 13:27

## 2024-12-09 RX ADMIN — PANTOPRAZOLE SODIUM 40 MG: 40 INJECTION, POWDER, FOR SOLUTION INTRAVENOUS at 08:35

## 2024-12-09 RX ADMIN — PROPOFOL 200 MG: 10 INJECTION, EMULSION INTRAVENOUS at 13:24

## 2024-12-09 RX ADMIN — LIDOCAINE 1 PATCH: 50 PATCH CUTANEOUS at 08:56

## 2024-12-09 NOTE — ASSESSMENT & PLAN NOTE
Continues to drink daily despite currently undergoing drug and alcohol rehab at [facility] and multiple providers expressing cessation  Drinks between 2 and 6 beers daily, last drink on 12/8  Will start Librium taper for withdrawal symptoms  [facility] protocol  Multivitamin, thiamine, folic acid daily  Case management consult to provide resources for drug and alcohol rehab, educated patient on alcohol cessation and how continued alcohol use is causing symptoms to become worse  C/w librium taper

## 2024-12-09 NOTE — CASE MANAGEMENT
Case Management Discharge Planning Note    Patient name Robert Combs  Location /-01 MRN [id number]  : 1975 Date 2024       Current Admission Date: 2024  Current Admission Diagnosis:Intractable nausea and vomiting   Patient Active Problem List    Diagnosis Date Noted Date Diagnosed    Alcohol use disorder, severe, dependence (HCC) 2024     Intractable nausea and vomiting 2024     Hyperammonemia (HCC) 2024     Polysubstance abuse (HCC) 2024     Acute alcoholic gastritis without hemorrhage 2024     Hypokalemia 2024     Pancytopenia (HCC) 2024     Enteritis 2024     Alcohol intoxication with delirium (HCC) 2024     Thrombocytopenia (HCC) 2024     Suicidal behavior with attempted self-injury (HCC) 2024     Ambulatory dysfunction 2024     Abnormal CT of the abdomen 2024     Alcoholic cirrhosis of liver with ascites (HCC) 10/31/2023     Gallstones 10/31/2023     Incarcerated umbilical hernia 10/25/2023     Left against medical advice 2019     Hepatitis C virus infection without hepatic coma 2019     Chronic bilateral low back pain with bilateral sciatica 2019     Alcohol abuse, daily use 2019     Marijuana use 2019     Depression with anxiety 2019       LOS (days): 1  Geometric Mean LOS (GMLOS) (days):   Days to GMLOS:     OBJECTIVE:  Risk of Unplanned Readmission Score: 30.41         Current admission status: Inpatient   Preferred Pharmacy:   [facility]/pharm[facility] #[id number] - [place] - [address]  [address]  [place] [address]  Phone: [phone] Fax: [phone]    Primary Care Provider: Yolanda Billy DO    Primary Insurance: DANGELO CANDELARIO  Secondary Insurance:     DISCHARGE DETAILS:    Discharge planning discussed with:: Patient  Freedom of Choice: Yes  Comments - Freedom of Choice: CM discussed freedom of choice as it pertains to discharge planning. CM expressed to  patient concerns with leaving AMA. Patient informed cm can contact appropriate people on his behalf with his permission as pt has appointment tomorrow. Patient declined.         Transport at Discharge : Yasmin Montgomery (Signed ab palacios placed in medical records)
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
## 2024-12-09 NOTE — ASSESSMENT & PLAN NOTE
Ammonia elevated at 73  Patient reports noncompliance with lactulose, will restart lactulose 20 g twice daily  Pt left ama  Follow with PCP

## 2024-12-09 NOTE — ASSESSMENT & PLAN NOTE
Continues to drink daily despite currently undergoing drug and alcohol rehab at [facility] and multiple providers expressing cessation  Drinks between 2 and 6 beers daily, last drink on 12/8  Pt was started on Librium taper for withdrawal symptoms and CIWA protocol  S/p Multivitamin, thiamine, folic acid daily  Pt left ama  Follow with PCP

## 2024-12-09 NOTE — ASSESSMENT & PLAN NOTE
In the setting of ascites with alcoholic cirrhosis and alcohol use  C/w Protonix, sucralfate  Follow up with GI and PCP

## 2024-12-09 NOTE — ASSESSMENT & PLAN NOTE
"Reports multiple falls at home within the last month and does report at times he does \"pass out\"  Reports he had a syncopal episode yesterday but denies any head injury  CT head showed no acute pathology  Encouraged alcohol cessation  Pt left ama  Follow up with PCP  "

## 2024-12-09 NOTE — ASSESSMENT & PLAN NOTE
CT chest abdomen pelvis revealing cirrhotic liver with ascites and portal hypertension with associated varices and splenomegaly  Feel intensity of nausea vomiting and abdominal pain in the setting of worsening ascites with alcoholic cirrhosis and patient continuing to drink alcohol daily  MELD score 21  Patient reports being scheduled 12/16 for colonoscopy and EGD, will consult GI to determine inpatient versus outpatient follow-up for these tests  Positive fluid wave and noted ascites on CT abdomen pelvis so will start IV Lasix 40 mg twice daily  Continue spironolactone 50 mg daily, low-sodium diet  S/p EGD with confirmed varix III, no active bleeding  Pt left AMA  Follow with GI in o/p settings  Follow with PCP

## 2024-12-09 NOTE — ANESTHESIA POSTPROCEDURE EVALUATION
Post-Op Assessment Note    CV Status:  Stable  Pain Score: 0    Pain management: adequate       Mental Status:  Arousable   Hydration Status:  Stable   PONV Controlled:  None   Airway Patency:  Patent     Post Op Vitals Reviewed: Yes    No anethesia notable event occurred.    Staff: CRNA           Last Filed PACU Vitals:  Vitals Value Taken Time   Temp     Pulse 80    /70    Resp 14    SpO2 93        Modified Magen:  Activity: 2 (12/9/2024 12:05 PM)  Respiration: 2 (12/9/2024 12:05 PM)  Circulation: 2 (12/9/2024 12:05 PM)  Consciousness: 2 (12/9/2024 12:05 PM)  Oxygen Saturation: 2 (12/9/2024 12:05 PM)  Modified Magen Score: 10 (12/9/2024 12:05 PM)

## 2024-12-09 NOTE — PROGRESS NOTES
"Progress Note - Hospitalist   Name: Robert Combs 49 y.o. male I MRN: [id number]  Unit/Bed#: -01 I Date of Admission: 12/7/2024   Date of Service: 12/9/2024 I Hospital Day: 1    Assessment & Plan  Intractable nausea and vomiting  In the setting of ascites with alcoholic cirrhosis and alcohol use  As needed Tigan, Benadryl for nausea vomiting (QTc prolonged at 505)  Start Protonix, Pepcid, sucralfate    Alcoholic cirrhosis of liver with ascites (HCC)  CT chest abdomen pelvis revealing cirrhotic liver with ascites and portal hypertension with associated varices and splenomegaly  Feel intensity of nausea vomiting and abdominal pain in the setting of worsening ascites with alcoholic cirrhosis and patient continuing to drink alcohol daily  MELD score 21  Patient reports being scheduled 12/16 for colonoscopy and EGD, will consult GI to determine inpatient versus outpatient follow-up for these tests  Positive fluid wave and noted ascites on CT abdomen pelvis so will start IV Lasix 40 mg twice daily  Continue spironolactone 50 mg daily, low-sodium diet  Hepatitis C virus infection without hepatic coma  Appreciate GI consult  Thrombocytopenia (HCC)  Platelets currently 70,000 which appears to be around baseline of 60-70,000  No active bleeding noted  Monitor CBC  Hold VTE prophylaxis  Ambulatory dysfunction  Reports multiple falls at home within the last month and does report at times he does \"pass out\"  Reports he had a syncopal episode yesterday but denies any head injury  Will obtain CT head  Encouraged alcohol cessation  Abnormal CT of the abdomen  CT chest abdomen pelvis revealing multiple hepatic hypodensities, unspecified  Recommendation to follow-up outpatient with GI for MRI abdomen with contrast  Hypokalemia  Chronic, the setting of alcoholic cirrhosis and alcohol use  Currently 3.6  Follow daily bmp  Alcohol use disorder, severe, dependence (HCC)  Continues to drink daily despite currently undergoing drug and " alcohol rehab at [facility] and multiple providers expressing cessation  Drinks between 2 and 6 beers daily, last drink on 12/8  Will start Librium taper for withdrawal symptoms  [facility] protocol  Multivitamin, thiamine, folic acid daily  Case management consult to provide resources for drug and alcohol rehab, educated patient on alcohol cessation and how continued alcohol use is causing symptoms to become worse  C/w librium taper  Hyperammonemia (HCC)  Ammonia elevated at 73  Patient reports noncompliance with lactulose, will restart lactulose 20 g twice daily    VTE Pharmacologic Prophylaxis: VTE Score: 2 Low Risk (Score 0-2) - Encourage Ambulation.    Mobility:   Basic Mobility Inpatient Raw Score: 23  JH-HLM Goal: 7: Walk 25 feet or more  JH-HLM Achieved: 7: Walk 25 feet or more  JH-HLM Goal NOT achieved. Continue with multidisciplinary rounding and encourage appropriate mobility to improve upon JH-HLM goals.    Patient Centered Rounds: I performed bedside rounds with nursing staff today.   Discussions with Specialists or Other Care Team Provider: attending physician    Education and Discussions with Family / Patient:  pt declined.     Current Length of Stay: 1 day(s)  Current Patient Status: Inpatient   Certification Statement: The patient will continue to require additional inpatient hospital stay due to etoh withdrawal  Discharge Plan: Anticipate discharge in 24-48 hrs to home.    Code Status: Level 1 - Full Code    Subjective   No acute events overnight, pt is hemodynamically stable. Pt states his nausea and vomiting improved overnight. He states that he is feeling well and asking when he can go home. Pt was explained that medications currently are controlling his withdrawal however if he stops it right now it might endanger his life. Pt expressed understanding and intent to comply.    Objective :  Temp:  [97.3 °F (36.3 °C)-99.6 °F (37.6 °C)] 97.3 °F (36.3 °C)  HR:  [] 85  BP: (101-120)/(54-74) 120/70  Resp:   [12-18] 12  SpO2:  [94 %-97 %] 97 %  O2 Device: None (Room air)    Body mass index is 28.5 kg/m².     Input and Output Summary (last 24 hours):     Intake/Output Summary (Last 24 hours) at 12/9/2024 1251  Last data filed at 12/9/2024 0500  Gross per 24 hour   Intake 120 ml   Output 420 ml   Net -300 ml       Physical Exam  Vitals and nursing note reviewed.   Constitutional:       General: He is not in acute distress.     Appearance: He is well-developed.      Comments: Somewhat diaphoretic   HENT:      Head: Normocephalic and atraumatic.      Mouth/Throat:      Mouth: Mucous membranes are moist.      Comments: Tongue fasciculations are noticed on exam  Eyes:      Conjunctiva/sclera: Conjunctivae normal.   Cardiovascular:      Rate and Rhythm: Normal rate and regular rhythm.      Heart sounds: No murmur heard.  Pulmonary:      Effort: Pulmonary effort is normal. No respiratory distress.      Breath sounds: Normal breath sounds.   Abdominal:      Palpations: Abdomen is soft.      Tenderness: There is no abdominal tenderness.   Musculoskeletal:         General: No swelling.      Cervical back: Neck supple.      Comments: B/l UE tremor noticed   Skin:     General: Skin is warm and dry.      Capillary Refill: Capillary refill takes less than 2 seconds.   Neurological:      Mental Status: He is alert.   Psychiatric:         Mood and Affect: Mood normal.           Lines/Drains:        Telemetry:  Telemetry Orders (From admission, onward)               24 Hour Telemetry Monitoring  Continuous x 24 Hours (Telem)        Question:  Reason for 24 Hour Telemetry  Answer:  Alcohol withdrawal and CIWA >7, electrolyte abnormalities, abnormal ECG and/or heart disease                     Telemetry Reviewed: Sinus Tachycardia  Indication for Continued Telemetry Use: Metabolic/electrolyte disturbance with high probability of dysrhythmia               Lab Results: I have reviewed the following results:   Results from last 7 days   Lab  Units 12/09/24  0455   WBC Thousand/uL 3.15*   HEMOGLOBIN g/dL 11.1*   HEMATOCRIT % 33.0*   PLATELETS Thousands/uL 53*   SEGS PCT % 59   LYMPHO PCT % 20   MONO PCT % 18*   EOS PCT % 1     Results from last 7 days   Lab Units 12/09/24  0455   SODIUM mmol/L 133*   POTASSIUM mmol/L 3.6   CHLORIDE mmol/L 103   CO2 mmol/L 24   BUN mg/dL 6   CREATININE mg/dL 0.65   ANION GAP mmol/L 6   CALCIUM mg/dL 7.8*   ALBUMIN g/dL 2.5*   TOTAL BILIRUBIN mg/dL 7.21*   ALK PHOS U/L 78   ALT U/L 22   AST U/L 87*   GLUCOSE RANDOM mg/dL 87     Results from last 7 days   Lab Units 12/07/24 2041   INR  2.56*                   Recent Cultures (last 7 days):         Imaging Results Review: I reviewed radiology reports from this admission including: CT head.  Other Study Results Review: EKG was reviewed.     Last 24 Hours Medication List:     Current Facility-Administered Medications:     aluminum-magnesium hydroxide-simethicone (MAALOX) oral suspension 30 mL, Q6H PRN    amLODIPine (NORVASC) tablet 2.5 mg, Daily    chlordiazePOXIDE (LIBRIUM) capsule 25 mg, Q8H RHIANNON **FOLLOWED BY** [START ON 12/10/2024] chlordiazePOXIDE (LIBRIUM) capsule 25 mg, Q12H RHIANNON **FOLLOWED BY** [START ON 12/12/2024] chlordiazePOXIDE (LIBRIUM) capsule 25 mg, HS    dicyclomine (BENTYL) capsule 10 mg, 4x Daily PRN    diphenhydrAMINE (BENADRYL) injection 12.5 mg, Q6H PRN    famotidine (PEPCID) tablet 10 mg, Daily    folic acid (FOLVITE) tablet 1 mg, Daily    furosemide (LASIX) injection 40 mg, BID    HYDROmorphone HCl (DILAUDID) injection 0.2 mg, Q2H PRN    lactulose (CHRONULAC) oral solution 20 g, BID    lidocaine (LIDODERM) 5 % patch 1 patch, Daily PRN    lidocaine (LIDODERM) 5 % patch 1 patch, Daily    magnesium sulfate 4 g/100 mL IVPB (premix) 4 g, Once    melatonin tablet 9 mg, HS PRN    multivitamin stress formula tablet 1 tablet, Daily    pantoprazole (PROTONIX) injection 40 mg, Q12H RHIANNON    saccharomyces boulardii (FLORASTOR) capsule 250 mg, BID    spironolactone  (ALDACTONE) tablet 50 mg, Daily    sucralfate (CARAFATE) tablet 1 g, 4x Daily    thiamine tablet 100 mg, Daily    traZODone (DESYREL) tablet 150 mg, HS    trimethobenzamide (TIGAN) IM injection 200 mg, Q6H PRN    Administrative Statements   Today, Patient Was Seen By: Li Junior MD  I have spent a total time of 60 minutes in caring for this patient on the day of the visit/encounter including Diagnostic results, Prognosis, Risks and benefits of tx options, Instructions for management, Patient and family education, Importance of tx compliance, Risk factor reductions, Impressions, Counseling / Coordination of care, Documenting in the medical record, Reviewing / ordering tests, medicine, procedures  , Obtaining or reviewing history  , and Communicating with other healthcare professionals .    **Please Note: This note may have been constructed using a voice recognition system.**

## 2024-12-09 NOTE — ANESTHESIA POSTPROCEDURE EVALUATION
Post-Op Assessment Note    Last Filed PACU Vitals:  Vitals Value Taken Time   Temp 97 °F (36.1 °C) 12/09/24 1336   Pulse 91 12/09/24 1346   /68 12/09/24 1346   Resp 20 12/09/24 1346   SpO2 95 % 12/09/24 1346       Modified Magen:  Activity: 2 (12/9/2024  1:46 PM)  Respiration: 2 (12/9/2024  1:46 PM)  Circulation: 2 (12/9/2024  1:46 PM)  Consciousness: 1 (12/9/2024  1:46 PM)  Oxygen Saturation: 2 (12/9/2024  1:46 PM)  Modified Magen Score: 9 (12/9/2024  1:46 PM)

## 2024-12-09 NOTE — PLAN OF CARE
Problem: PAIN - ADULT  Goal: Verbalizes/displays adequate comfort level or baseline comfort level  Description: Interventions:  - Encourage patient to monitor pain and request assistance  - Assess pain using appropriate pain scale  - Administer analgesics based on type and severity of pain and evaluate response  - Implement non-pharmacological measures as appropriate and evaluate response  - Consider cultural and social influences on pain and pain management  - Notify physician/advanced practitioner if interventions unsuccessful or patient reports new pain  Outcome: Progressing     Problem: Nutrition/Hydration-ADULT  Goal: Nutrient/Hydration intake appropriate for improving, restoring or maintaining nutritional needs  Description: Monitor and assess patient's nutrition/hydration status for malnutrition. Collaborate with interdisciplinary team and initiate plan and interventions as ordered.  Monitor patient's weight and dietary intake as ordered or per policy. Utilize nutrition screening tool and intervene as necessary. Determine patient's food preferences and provide high-protein, high-caloric foods as appropriate.     INTERVENTIONS:  - Monitor oral intake, urinary output, labs, and treatment plans  - Assess nutrition and hydration status and recommend course of action  - Evaluate amount of meals eaten  - Assist patient with eating if necessary   - Allow adequate time for meals  - Recommend/ encourage appropriate diets, oral nutritional supplements, and vitamin/mineral supplements  - Order, calculate, and assess calorie counts as needed  - Recommend, monitor, and adjust tube feedings and TPN/PPN based on assessed needs  - Assess need for intravenous fluids  - Provide specific nutrition/hydration education as appropriate  - Include patient/family/caregiver in decisions related to nutrition  Outcome: Progressing     Problem: GASTROINTESTINAL - ADULT  Goal: Minimal or absence of nausea and/or vomiting  Description:  INTERVENTIONS:  - Administer IV fluids if ordered to ensure adequate hydration  - Maintain NPO status until nausea and vomiting are resolved  - Nasogastric tube if ordered  - Administer ordered antiemetic medications as needed  - Provide nonpharmacologic comfort measures as appropriate  - Advance diet as tolerated, if ordered  - Consider nutrition services referral to assist patient with adequate nutrition and appropriate food choices  Outcome: Progressing     Problem: METABOLIC, FLUID AND ELECTROLYTES - ADULT  Goal: Electrolytes maintained within normal limits  Description: INTERVENTIONS:  - Monitor labs and assess patient for signs and symptoms of electrolyte imbalances  - Administer electrolyte replacement as ordered  - Monitor response to electrolyte replacements, including repeat lab results as appropriate  - Instruct patient on fluid and nutrition as appropriate  Outcome: Progressing

## 2024-12-09 NOTE — PROGRESS NOTES
Pt requested to leave AMA, after returning from his EGD procedure around 1435, I provided education about the risk and benefit of leaving AMA, as his EGD showed some varices that requires medical intervention but patient wasn't persuaded. So I notified ,  and  Dr. Junior, they came and talked to pt , explained the risk of leaving AMA but still wasn't persuaded, at the time pt was AXOX4, he denied any suicidal intention. So he was asked to signed the AMA form.

## 2024-12-09 NOTE — PLAN OF CARE
Problem: METABOLIC, FLUID AND ELECTROLYTES - ADULT  Goal: Electrolytes maintained within normal limits  Description: INTERVENTIONS:  - Monitor labs and assess patient for signs and symptoms of electrolyte imbalances  - Administer electrolyte replacement as ordered  - Monitor response to electrolyte replacements, including repeat lab results as appropriate  - Instruct patient on fluid and nutrition as appropriate  Outcome: Progressing  Goal: Fluid balance maintained  Description: INTERVENTIONS:  - Monitor labs   - Monitor I/O and WT  - Instruct patient on fluid and nutrition as appropriate  - Assess for signs & symptoms of volume excess or deficit  Outcome: Progressing     Problem: GASTROINTESTINAL - ADULT  Goal: Minimal or absence of nausea and/or vomiting  Description: INTERVENTIONS:  - Administer IV fluids if ordered to ensure adequate hydration  - Maintain NPO status until nausea and vomiting are resolved  - Nasogastric tube if ordered  - Administer ordered antiemetic medications as needed  - Provide nonpharmacologic comfort measures as appropriate  - Advance diet as tolerated, if ordered  - Consider nutrition services referral to assist patient with adequate nutrition and appropriate food choices  Outcome: Progressing     Problem: Nutrition/Hydration-ADULT  Goal: Nutrient/Hydration intake appropriate for improving, restoring or maintaining nutritional needs  Description: Monitor and assess patient's nutrition/hydration status for malnutrition. Collaborate with interdisciplinary team and initiate plan and interventions as ordered.  Monitor patient's weight and dietary intake as ordered or per policy. Utilize nutrition screening tool and intervene as necessary. Determine patient's food preferences and provide high-protein, high-caloric foods as appropriate.     INTERVENTIONS:  - Monitor oral intake, urinary output, labs, and treatment plans  - Assess nutrition and hydration status and recommend course of  action  - Evaluate amount of meals eaten  - Assist patient with eating if necessary   - Allow adequate time for meals  - Recommend/ encourage appropriate diets, oral nutritional supplements, and vitamin/mineral supplements  - Order, calculate, and assess calorie counts as needed  - Recommend, monitor, and adjust tube feedings and TPN/PPN based on assessed needs  - Assess need for intravenous fluids  - Provide specific nutrition/hydration education as appropriate  - Include patient/family/caregiver in decisions related to nutrition  Outcome: Progressing     Problem: PAIN - ADULT  Goal: Verbalizes/displays adequate comfort level or baseline comfort level  Description: Interventions:  - Encourage patient to monitor pain and request assistance  - Assess pain using appropriate pain scale  - Administer analgesics based on type and severity of pain and evaluate response  - Implement non-pharmacological measures as appropriate and evaluate response  - Consider cultural and social influences on pain and pain management  - Notify physician/advanced practitioner if interventions unsuccessful or patient reports new pain  Outcome: Progressing

## 2024-12-09 NOTE — DISCHARGE SUMMARY
"Discharge Summary - Hospitalist   Name: Robert Combs 49 y.o. male I MRN: [id number]  Unit/Bed#: -01 I Date of Admission: 12/7/2024   Date of Service: 12/9/2024 I Hospital Day: 1     Assessment & Plan  Intractable nausea and vomiting  In the setting of ascites with alcoholic cirrhosis and alcohol use  C/w Protonix, sucralfate  Follow up with GI and PCP    Alcoholic cirrhosis of liver with ascites (HCC)  CT chest abdomen pelvis revealing cirrhotic liver with ascites and portal hypertension with associated varices and splenomegaly  Feel intensity of nausea vomiting and abdominal pain in the setting of worsening ascites with alcoholic cirrhosis and patient continuing to drink alcohol daily  MELD score 21  Patient reports being scheduled 12/16 for colonoscopy and EGD, will consult GI to determine inpatient versus outpatient follow-up for these tests  Positive fluid wave and noted ascites on CT abdomen pelvis so will start IV Lasix 40 mg twice daily  Continue spironolactone 50 mg daily, low-sodium diet  S/p EGD with confirmed varix III, no active bleeding  Pt left AMA  Follow with GI in o/p settings  Follow with PCP  Hepatitis C virus infection without hepatic coma  Follow up with PCP and GI   Thrombocytopenia (HCC)  Pt left AMA  Follow up with PCP  Ambulatory dysfunction  Reports multiple falls at home within the last month and does report at times he does \"pass out\"  Reports he had a syncopal episode yesterday but denies any head injury  CT head showed no acute pathology  Encouraged alcohol cessation  Pt left ama  Follow up with PCP  Abnormal CT of the abdomen  CT chest abdomen pelvis revealing multiple hepatic hypodensities, unspecified  Recommendation to follow-up outpatient with GI for MRI abdomen with contrast  Hypokalemia  Chronic, the setting of alcoholic cirrhosis and alcohol use  resolved  Alcohol use disorder, severe, dependence (HCC)  Continues to drink daily despite currently undergoing drug and alcohol " rehab at [facility] and multiple providers expressing cessation  Drinks between 2 and 6 beers daily, last drink on 12/8  Pt was started on Librium taper for withdrawal symptoms and CIWA protocol  S/p Multivitamin, thiamine, folic acid daily  Pt left ama  Follow with PCP  Hyperammonemia (HCC)  Ammonia elevated at 73  Patient reports noncompliance with lactulose, will restart lactulose 20 g twice daily  Pt left ama  Follow with PCP     Medical Problems       Resolved Problems  Date Reviewed: 12/9/2024   None       Discharging Physician / Practitioner: Li Junior MD  PCP: Yolanda Billy DO  Admission Date:   Admission Orders (From admission, onward)       Ordered        12/08/24 0002  INPATIENT ADMISSION  Once                          Discharge Date: 12/09/24    Consultations During Hospital Stay:  IP CONSULT TO GASTROENTEROLOGY  IP CONSULT TO CASE MANAGEMENT     Procedures Performed:   EGD    Significant Findings / Test Results:   CT chest abdomen pelvis revealing cirrhotic liver with ascites and portal hypertension with associated varices and splenomegaly     EGD:   One grade III varix in the lower third of the esophagus  Two grade II varices in the lower third of the esophagus  Moderate portal hypertensive gastropathy in the body of the stomach  Performed forceps biopsy in the antrum to rule out H. pylori  The duodenum appeared normal.    Incidental Findings:   none    Test Results Pending at Discharge (will require follow up):   None     Outpatient Tests Requested:  Cbc, cmp, EGD    Complications:  none    Reason for Admission: intractable nausea and vomiting    Hospital Course:   Robert Combs is a 49 y.o. male patient with past medical history of EtOH abuse, hepatitis C, alcoholic cirrhosis of the liver, polysubstance abuse disorder, depression who originally presented to the hospital on 12/7/2024 due to abdominal pain with radiation to his sternum and distention.  Additionally, patient reported  multiple falls at home. Patient was found to be hyperammonemic, hypokalemic. IVF and electrolytes repletion provided. CT head showed no acute brain pathology. CT chest abdomen pelvis revealing cirrhotic liver with ascites and portal hypertension with associated varices and splenomegaly.  Patient was started on Librium taper for alcohol withdrawal.  Patient was started on Lasix .GI was consulted and patient undergone EGD this a.m. which revealed multiple varix but no active bleeding.    Despite medical recommendations, warning of possible complications and death patient is insisted to leave hospital AMA.        Please see above list of diagnoses and related plan for additional information.     Condition at Discharge: serious    Discharge Day Visit / Exam:   * Please refer to separate progress note for these details *    Discussion with Family: Patient declined call to .     Discharge instructions/Information to patient and family:   See after visit summary for information provided to patient and family.      Provisions for Follow-Up Care:  See after visit summary for information related to follow-up care and any pertinent home health orders.      Mobility at time of Discharge:   Basic Mobility Inpatient Raw Score: 23  JH-HLM Goal: 7: Walk 25 feet or more  JH-HLM Achieved: 7: Walk 25 feet or more  HLM Goal NOT achieved. Continue to encourage mobility in post discharge setting.     Disposition:   Other: Left AMA    Planned Readmission: no    Discharge Medications:  See after visit summary for reconciled discharge medications provided to patient and/or family.      Administrative Statements   Discharge Statement:  I have spent a total time of 60 minutes in caring for this patient on the day of the visit/encounter. >30 minutes of time was spent on: Diagnostic results, Prognosis, Risks and benefits of tx options, Instructions for management, Patient and family education, Importance of tx compliance, Risk  factor reductions, Impressions, Counseling / Coordination of care, Documenting in the medical record, Reviewing / ordering tests, medicine, procedures  , and Communicating with other healthcare professionals .    **Please Note: This note may have been constructed using a voice recognition system**

## 2024-12-09 NOTE — UTILIZATION REVIEW
NOTIFICATION OF INPATIENT ADMISSION   AUTHORIZATION REQUEST   SERVICING FACILITY:   [place] [address]  Tax ID: [id number]  NPI: [id number] ATTENDING PROVIDER:  Attending Name and NPI#: Ventura Cunningham Do [[id number]]  Address: [address] [place] [address]  Phone: [phone]     ADMISSION INFORMATION:  Place of Service: Inpatient [facility]  Place of Service Code: 21  Inpatient Admission Date/Time: 12/8/24 12:02 AM  Discharge Date/Time: No discharge date for patient encounter.  Admitting Diagnosis Code/Description:  Alcohol withdrawal (HCC) [F10.939]  Alcoholic cirrhosis (HCC) [K70.30]  Chest pain [R07.9]  Abdominal pain [R10.9]  Nausea and vomiting [R11.2]     UTILIZATION REVIEW CONTACT:  Victoria Cameron, Utilization   Network Utilization Review Department  Phone: [phone]  Fax [phone]  Email: [email or web address]  Contact for approvals/pending authorizations, clinical reviews, and discharge.     PHYSICIAN ADVISORY SERVICES:  Medical Necessity Denial & [email or web address] Review  Phone: [phone]  Fax: [phone]  Email: [email or web address]     DISCHARGE SUPPORT TEAM:  For Patients Discharge Needs & Updates  Phone: [phone] opt. [phone] Fax: [phone]  Email: [email or web address]

## 2024-12-10 ENCOUNTER — TRANSITIONAL CARE MANAGEMENT (OUTPATIENT)
Dept: FAMILY MEDICINE CLINIC | Facility: CLINIC | Age: 49
End: 2024-12-10

## 2024-12-10 PROCEDURE — 88305 TISSUE EXAM BY PATHOLOGIST: CPT | Performed by: PATHOLOGY

## 2024-12-16 ENCOUNTER — TELEPHONE (OUTPATIENT)
Age: 49
End: 2024-12-16

## 2024-12-16 NOTE — TELEPHONE ENCOUNTER
Rescheduled from canceled appt    Scheduled date of colonoscopy (as of today): 1-3-2025  Physician performing colonoscopy: Dr. Schneider  Location of colonoscopy: [facility]   Bowel prep reviewed with patient: miralax dulcolax   Instructions reviewed with patient by: eric   Clearances: n/a

## 2024-12-17 NOTE — PROGRESS NOTES
Transition of Care Visit  Name: Robert Combs      : 1975      MRN: [id number]  Encounter Provider: Dae Chin PA-C  Encounter Date: 2024   Encounter department: [facility]    Assessment & Plan  Alcoholic cirrhosis of liver with ascites (HCC)  Patient presented with nausea and vomiting with significant history of alcoholic cirrhosis with ascites  CT ordered during admission revealed cirrhotic liver with ascites and portal hypertension, personally reviewed  Upon discharge, patient was scheduled for  colonoscopy and EGD, but needed to be rescheduled secondary to weather  Patient is now scheduled upon chart review for January for these test  Clarified medications with patient, encouraged continued use of spironolactone  Personally reviewed EGD findings during admission with patient  Discussed at length with patient that alcohol cessation is necessary to prevent further morbidity and mortality, did offer substance use disorder resources, patient states he has these resources ready.  Did still attach resources to discharge paperwork.  Continue follow-up with GI  Follow up:PRN, if new onset external sustained bleeding, continued intractable vomiting , chest pain, difficulty breathing , or loss of consciousness  report to the ED, and pt is in agreement.         Thrombocytopenia (HCC)  Pt left AMA so plan incomplete in hospital   Labs reveal sustained thrombocytopenia  Likely in the setting of chronic alcohol abuse  Educated patient that he has increased risk of bleeding with these values, encouraged alcohol cessation       Mass of chest wall  During exam today patient expresses concern about a mass that he is felt over his sternum in his center chest wall  He describes the mass is painful to palpation  Exam does reveal about a 1.5 x 1 inch mass present over the midline chest in the area of the sternum.  This mass does appear tender to palpation with the patient wincing upon  palpation  It does appear and palpate with resemblance of a soft tissue mass, will order ultrasound to evaluate further  Orders:    US superficial lump (non extremity); Future    Alcohol use disorder, severe, dependence (HCC)  Documentation from admission reveals that patient admits to drinking ETOH daily despite completing outpatient rehab program prior  He tells me today he only drinks about 2 beers nightly at this point  He continues to take thiamine and folic acid supplementation  Documentation from hospital mission reveals that patient was started on Librium taper, but he states that he was never started on this and does not recall getting this medication  I offered him additional resources/medication which he denies at this time stating he has the resources he needs  Continue to monitor       Hyperammonemia (HCC)  Elevated at 73, on discharge told to restart lactulose d/t elevated levels  Chart reveals that at last visit, GI recommended stopping lactulose in the short-term due to patient having adverse bowel movement effects, the risk and benefits of reinitiating at this time would recommend patient continue to take  Follow-up with GI for further management            History of Present Illness     Transitional Care Management Review:   Robert Combs is a 49 y.o. male here for TCM follow up.     During the TCM phone call patient stated:  TCM Call       Date and time call was made  7/22/2022 10:09 AM    Hospital care reviewed  Records reviewed    Patient was hospitialized at  [facility]    Date of Admission  12/07/24    Date of discharge  12/09/24    Diagnosis  Intractable nausea and vomiting    Disposition  Home    Were the patients medications reviewed and updated  No    Current Symptoms  None          TCM Call       Post hospital issues  None    Should patient be enrolled in anticoag monitoring?  No    Did you obtain your prescribed medications  Yes    Do you need help managing your prescriptions or  "medications  No    Is transportation to your appointment needed  No    I have advised the patient to call PCP with any new or worsening symptoms  Janie Awad LPN          Robert Combs is a 49 y.o. male  presenting for TCM, admission (Left AMA) from 12/7/2024 - 12/9/2024 (2 days) at [facility]. Hospital discharge course provided by Ventura Cunningham DO:       \"Robert Combs is a 49 y.o. male patient with past medical history of EtOH abuse, hepatitis C, alcoholic cirrhosis of the liver, polysubstance abuse disorder, depression who originally presented to the hospital on 12/7/2024 due to abdominal pain with radiation to his sternum and distention.  Additionally, patient reported multiple falls at home. Patient was found to be hyperammonemic, hypokalemic. IVF and electrolytes repletion provided. CT head showed no acute brain pathology. CT chest abdomen pelvis revealing cirrhotic liver with ascites and portal hypertension with associated varices and splenomegaly.  Patient was started on Librium taper for alcohol withdrawal.  Patient was started on Lasix .GI was consulted and patient undergone EGD this a.m. which revealed multiple varix but no active bleeding.  Despite medical recommendations, warning of possible complications and death patient is insisted to leave hospital AMA.\"    Today he presents feeling overall improved from his hospital stay.  He recounts that he signed out AMA simply because he did not want to be there anymore.  I did take the time to review medications commented on during his admission stay and stressed the importance of medication compliance as well as alcohol cessation.  When reviewing the noted diagnoses during admission, I did stressed to patient that the majority of these diagnoses are related to his long-term alcohol use, I offered him substance abuse resources today, which he denied stating that he has these resources ready.  I did attach additional resources to his discharge " "paperwork and advised him to follow-up as needed.  He is also scheduled to see GI and I have reordered ultrasound of the mass present on his chest wall as it appears that this was not ordered/reordered during admission.      Review of Systems   Constitutional:  Negative for chills and fever.   HENT:  Negative for ear pain and sore throat.    Eyes:  Negative for pain and visual disturbance.   Respiratory:  Negative for cough and shortness of breath.    Cardiovascular:  Negative for chest pain and palpitations.   Gastrointestinal:  Negative for abdominal pain and vomiting.   Genitourinary:  Negative for dysuria and hematuria.   Musculoskeletal:  Negative for arthralgias and back pain.   Skin:  Negative for color change and rash.        Mass present on chest wall   Neurological:  Negative for seizures and syncope.   All other systems reviewed and are negative.    Objective   /68 (BP Location: Left arm, Patient Position: Sitting, Cuff Size: Large)   Pulse (!) 118   Temp 98.4 °F (36.9 °C)   Ht 5' 9\" (1.753 m)   Wt 81.6 kg (180 lb)   SpO2 97%   BMI 26.58 kg/m²     Physical Exam  Vitals and nursing note reviewed.   Constitutional:       General: He is not in acute distress.     Appearance: He is well-developed.   HENT:      Head: Normocephalic and atraumatic.   Eyes:      Conjunctiva/sclera: Conjunctivae normal.   Cardiovascular:      Rate and Rhythm: Normal rate and regular rhythm.      Heart sounds: No murmur heard.  Pulmonary:      Effort: Pulmonary effort is normal. No respiratory distress.      Breath sounds: Normal breath sounds. No stridor. No wheezing or rhonchi.   Abdominal:      General: Bowel sounds are normal. There is distension.      Palpations: There is no mass.      Tenderness: There is no abdominal tenderness. There is no guarding or rebound.      Hernia: No hernia is present.   Musculoskeletal:         General: No swelling.      Cervical back: Neck supple.   Skin:     General: Skin is warm and " dry.      Capillary Refill: Capillary refill takes less than 2 seconds.      Coloration: Skin is not jaundiced.             Comments: There is a soft tissue mass in the chest wall, it appears about 1.5 x 1 inch in size.  It is mobile and does resemble soft tissue mass but is painful to palpation.   Neurological:      Mental Status: He is alert and oriented to person, place, and time.   Psychiatric:         Mood and Affect: Mood normal.       Medications have been reviewed by provider in current encounter

## 2024-12-18 ENCOUNTER — OFFICE VISIT (OUTPATIENT)
Dept: FAMILY MEDICINE CLINIC | Facility: CLINIC | Age: 49
End: 2024-12-18
Payer: COMMERCIAL

## 2024-12-18 VITALS
OXYGEN SATURATION: 97 % | BODY MASS INDEX: 26.66 KG/M2 | SYSTOLIC BLOOD PRESSURE: 118 MMHG | HEIGHT: 69 IN | WEIGHT: 180 LBS | HEART RATE: 118 BPM | DIASTOLIC BLOOD PRESSURE: 68 MMHG | TEMPERATURE: 98.4 F

## 2024-12-18 DIAGNOSIS — E72.20 HYPERAMMONEMIA (HCC): ICD-10-CM

## 2024-12-18 DIAGNOSIS — R22.2 MASS OF CHEST WALL: ICD-10-CM

## 2024-12-18 DIAGNOSIS — K70.31 ALCOHOLIC CIRRHOSIS OF LIVER WITH ASCITES (HCC): Primary | ICD-10-CM

## 2024-12-18 DIAGNOSIS — D69.6 THROMBOCYTOPENIA (HCC): ICD-10-CM

## 2024-12-18 DIAGNOSIS — F10.20 ALCOHOL USE DISORDER, SEVERE, DEPENDENCE (HCC): ICD-10-CM

## 2024-12-18 PROCEDURE — 99495 TRANSJ CARE MGMT MOD F2F 14D: CPT

## 2024-12-18 NOTE — PATIENT INSTRUCTIONS
[facility]   Phone: [phone]    [facility] ([facility])   Phone: [phone]  [address]  [facility] [place] [address]    Health system   Phone: [phone]  [address]  [address]   [place] [address]

## 2024-12-18 NOTE — ASSESSMENT & PLAN NOTE
Pt left AMA so plan incomplete in hospital   Labs reveal sustained thrombocytopenia  Likely in the setting of chronic alcohol abuse  Educated patient that he has increased risk of bleeding with these values, encouraged alcohol cessation

## 2024-12-18 NOTE — ASSESSMENT & PLAN NOTE
Patient presented with nausea and vomiting with significant history of alcoholic cirrhosis with ascites  CT ordered during admission revealed cirrhotic liver with ascites and portal hypertension, personally reviewed  Upon discharge, patient was scheduled for 12/16 colonoscopy and EGD, but needed to be rescheduled secondary to weather  Patient is now scheduled upon chart review for January for these test  Clarified medications with patient, encouraged continued use of spironolactone  Personally reviewed EGD findings during admission with patient  Discussed at length with patient that alcohol cessation is necessary to prevent further morbidity and mortality, did offer substance use disorder resources, patient states he has these resources ready.  Did still attach resources to discharge paperwork.  Continue follow-up with GI  Follow up:PRN, if new onset external sustained bleeding, continued intractable vomiting , chest pain, difficulty breathing , or loss of consciousness  report to the ED, and pt is in agreement.

## 2024-12-18 NOTE — ASSESSMENT & PLAN NOTE
Documentation from admission reveals that patient admits to drinking ETOH daily despite completing outpatient rehab program prior  He tells me today he only drinks about 2 beers nightly at this point  He continues to take thiamine and folic acid supplementation  Documentation from hospital mission reveals that patient was started on Librium taper, but he states that he was never started on this and does not recall getting this medication  I offered him additional resources/medication which he denies at this time stating he has the resources he needs  Continue to monitor

## 2024-12-18 NOTE — ASSESSMENT & PLAN NOTE
Elevated at 73, on discharge told to restart lactulose d/t elevated levels  Chart reveals that at last visit, GI recommended stopping lactulose in the short-term due to patient having adverse bowel movement effects, the risk and benefits of reinitiating at this time would recommend patient continue to take  Follow-up with GI for further management

## 2024-12-19 ENCOUNTER — RESULTS FOLLOW-UP (OUTPATIENT)
Dept: GASTROENTEROLOGY | Facility: MEDICAL CENTER | Age: 49
End: 2024-12-19

## 2024-12-19 DIAGNOSIS — E11.9 TYPE 2 DIABETES MELLITUS WITHOUT COMPLICATION, WITHOUT LONG-TERM CURRENT USE OF INSULIN (HCC): ICD-10-CM

## 2024-12-19 NOTE — RESULT ENCOUNTER NOTE
Gastric biopsy was benign, patient will need to have the EGD repeated for treatment of esophageal varices. Plz have him follow up. Thanks.

## 2024-12-20 RX ORDER — LANCETS 33 GAUGE
EACH MISCELLANEOUS
Qty: 200 EACH | Refills: 3 | Status: SHIPPED | OUTPATIENT
Start: 2024-12-20

## 2024-12-20 NOTE — TELEPHONE ENCOUNTER
I spoke directly with patient. Results were given. He will keep his appointment for March unless Dr Schneider would like to see him sooner

## 2024-12-20 NOTE — TELEPHONE ENCOUNTER
----- Message from Debby VALENTINO sent at 12/19/2024  1:34 PM EST -----  Hi doc. Patient already has an appt/ follow up on 3/3/25. Before I call him with results, do you want him to be seen sooner or is this follow up that was made from his visit with you on 12/2.   Thanks  ----- Message -----  From: Florentino Mcintosh MD  Sent: 12/19/2024  11:27 AM EST  To: [facility]    Gastric biopsy was benign, patient will need to have the EGD repeated for treatment of esophageal varices. Plz have him follow up. Thanks.

## 2024-12-27 DIAGNOSIS — R10.9 ABDOMINAL PAIN, UNSPECIFIED ABDOMINAL LOCATION: ICD-10-CM

## 2024-12-30 ENCOUNTER — HOSPITAL ENCOUNTER (OUTPATIENT)
Dept: ULTRASOUND IMAGING | Facility: HOSPITAL | Age: 49
Discharge: HOME | End: 2024-12-30
Payer: COMMERCIAL

## 2024-12-30 DIAGNOSIS — R10.9 ABDOMINAL PAIN, UNSPECIFIED ABDOMINAL LOCATION: ICD-10-CM

## 2024-12-30 DIAGNOSIS — R22.2 MASS OF CHEST WALL: ICD-10-CM

## 2024-12-30 PROCEDURE — 76705 ECHO EXAM OF ABDOMEN: CPT

## 2024-12-30 RX ORDER — DICYCLOMINE HYDROCHLORIDE 10 MG/1
10 CAPSULE ORAL 4 TIMES DAILY PRN
Qty: 120 CAPSULE | Refills: 0 | Status: SHIPPED | OUTPATIENT
Start: 2024-12-30

## 2025-01-02 ENCOUNTER — PREP FOR PROCEDURE (OUTPATIENT)
Dept: GASTROENTEROLOGY | Facility: CLINIC | Age: 50
End: 2025-01-02

## 2025-01-02 DIAGNOSIS — I85.00 ESOPHAGEAL VARICES WITHOUT BLEEDING, UNSPECIFIED ESOPHAGEAL VARICES TYPE (HCC): Primary | ICD-10-CM

## 2025-01-03 ENCOUNTER — HOSPITAL ENCOUNTER (OUTPATIENT)
Dept: GASTROENTEROLOGY | Facility: HOSPITAL | Age: 50
Setting detail: OUTPATIENT SURGERY
End: 2025-01-03
Attending: INTERNAL MEDICINE
Payer: COMMERCIAL

## 2025-01-03 ENCOUNTER — ANESTHESIA EVENT (OUTPATIENT)
Dept: GASTROENTEROLOGY | Facility: HOSPITAL | Age: 50
End: 2025-01-03
Payer: COMMERCIAL

## 2025-01-03 ENCOUNTER — ANESTHESIA (OUTPATIENT)
Dept: GASTROENTEROLOGY | Facility: HOSPITAL | Age: 50
End: 2025-01-03
Payer: COMMERCIAL

## 2025-01-03 VITALS
OXYGEN SATURATION: 95 % | WEIGHT: 190.04 LBS | HEART RATE: 78 BPM | SYSTOLIC BLOOD PRESSURE: 120 MMHG | BODY MASS INDEX: 28.15 KG/M2 | HEIGHT: 69 IN | DIASTOLIC BLOOD PRESSURE: 64 MMHG | TEMPERATURE: 97.9 F | RESPIRATION RATE: 19 BRPM

## 2025-01-03 DIAGNOSIS — R63.4 UNINTENTIONAL WEIGHT LOSS: ICD-10-CM

## 2025-01-03 DIAGNOSIS — R07.2 SUBSTERNAL CHEST PAIN: ICD-10-CM

## 2025-01-03 DIAGNOSIS — K62.9 ANAL LESION: Primary | ICD-10-CM

## 2025-01-03 PROCEDURE — 43255 EGD CONTROL BLEEDING ANY: CPT | Performed by: INTERNAL MEDICINE

## 2025-01-03 PROCEDURE — 43239 EGD BIOPSY SINGLE/MULTIPLE: CPT | Performed by: INTERNAL MEDICINE

## 2025-01-03 PROCEDURE — 88305 TISSUE EXAM BY PATHOLOGIST: CPT | Performed by: PATHOLOGY

## 2025-01-03 PROCEDURE — 45380 COLONOSCOPY AND BIOPSY: CPT | Performed by: INTERNAL MEDICINE

## 2025-01-03 RX ORDER — PROPOFOL 10 MG/ML
INJECTION, EMULSION INTRAVENOUS AS NEEDED
Status: DISCONTINUED | OUTPATIENT
Start: 2025-01-03 | End: 2025-01-03

## 2025-01-03 RX ORDER — LIDOCAINE HYDROCHLORIDE 20 MG/ML
INJECTION, SOLUTION EPIDURAL; INFILTRATION; INTRACAUDAL; PERINEURAL AS NEEDED
Status: DISCONTINUED | OUTPATIENT
Start: 2025-01-03 | End: 2025-01-03

## 2025-01-03 RX ORDER — METOCLOPRAMIDE HYDROCHLORIDE 5 MG/ML
10 INJECTION INTRAMUSCULAR; INTRAVENOUS ONCE
Status: COMPLETED | OUTPATIENT
Start: 2025-01-03 | End: 2025-01-03

## 2025-01-03 RX ORDER — SODIUM CHLORIDE, SODIUM LACTATE, POTASSIUM CHLORIDE, CALCIUM CHLORIDE 600; 310; 30; 20 MG/100ML; MG/100ML; MG/100ML; MG/100ML
125 INJECTION, SOLUTION INTRAVENOUS CONTINUOUS
Status: DISCONTINUED | OUTPATIENT
Start: 2025-01-03 | End: 2025-01-07 | Stop reason: HOSPADM

## 2025-01-03 RX ORDER — ONDANSETRON 2 MG/ML
4 INJECTION INTRAMUSCULAR; INTRAVENOUS ONCE AS NEEDED
Status: CANCELLED | OUTPATIENT
Start: 2025-01-03

## 2025-01-03 RX ADMIN — LIDOCAINE HYDROCHLORIDE 100 MG: 20 INJECTION, SOLUTION EPIDURAL; INFILTRATION; INTRACAUDAL; PERINEURAL at 08:10

## 2025-01-03 RX ADMIN — METOCLOPRAMIDE 10 MG: 5 INJECTION, SOLUTION INTRAMUSCULAR; INTRAVENOUS at 09:26

## 2025-01-03 RX ADMIN — PROPOFOL 50 MG: 10 INJECTION, EMULSION INTRAVENOUS at 08:18

## 2025-01-03 RX ADMIN — PROPOFOL 30 MG: 10 INJECTION, EMULSION INTRAVENOUS at 08:36

## 2025-01-03 RX ADMIN — PROPOFOL 150 MG: 10 INJECTION, EMULSION INTRAVENOUS at 08:10

## 2025-01-03 RX ADMIN — PROPOFOL 20 MG: 10 INJECTION, EMULSION INTRAVENOUS at 08:32

## 2025-01-03 RX ADMIN — PROPOFOL 50 MG: 10 INJECTION, EMULSION INTRAVENOUS at 08:13

## 2025-01-03 RX ADMIN — PROPOFOL 50 MG: 10 INJECTION, EMULSION INTRAVENOUS at 08:20

## 2025-01-03 RX ADMIN — SODIUM CHLORIDE, SODIUM LACTATE, POTASSIUM CHLORIDE, AND CALCIUM CHLORIDE: .6; .31; .03; .02 INJECTION, SOLUTION INTRAVENOUS at 08:07

## 2025-01-03 RX ADMIN — PROPOFOL 50 MG: 10 INJECTION, EMULSION INTRAVENOUS at 08:23

## 2025-01-03 RX ADMIN — PROPOFOL 30 MG: 10 INJECTION, EMULSION INTRAVENOUS at 08:29

## 2025-01-03 RX ADMIN — PROPOFOL 50 MG: 10 INJECTION, EMULSION INTRAVENOUS at 08:15

## 2025-01-03 NOTE — ANESTHESIA POSTPROCEDURE EVALUATION
Post-Op Assessment Note    CV Status:  Stable    Pain management: adequate       Mental Status:  Sleepy   Hydration Status:  Euvolemic   PONV Controlled:  Controlled   Airway Patency:  Patent  Two or more mitigation strategies used for obstructive sleep apnea   Post Op Vitals Reviewed: Yes    No anethesia notable event occurred.    Staff: Anesthesiologist, CRNA           Last Filed PACU Vitals:  Vitals Value Taken Time   Temp 97.9    Pulse 75    /70    Resp 16    SpO2 93
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
## 2025-01-03 NOTE — ANESTHESIA PREPROCEDURE EVALUATION
Procedure:  COLONOSCOPY  EGD    Relevant Problems   ANESTHESIA (within normal limits)      CARDIO (within normal limits)      ENDO (within normal limits)      GI/HEPATIC   (+) Alcoholic cirrhosis of liver with ascites (HCC)   (+) Hepatitis C virus infection without hepatic coma      HEMATOLOGY   (+) Pancytopenia (HCC)   (+) Thrombocytopenia (HCC)      MUSCULOSKELETAL   (+) Chronic bilateral low back pain with bilateral sciatica      NEURO/PSYCH   (+) Chronic bilateral low back pain with bilateral sciatica   (+) Depression with anxiety      PULMONARY (within normal limits)        Physical Exam    Airway    Mallampati score: III  TM Distance: >3 FB  Neck ROM: full     Dental   No notable dental hx     Cardiovascular  Cardiovascular exam normal    Pulmonary  Pulmonary exam normal     Other Findings        Anesthesia Plan  ASA Score- 2     Anesthesia Type- IV sedation with anesthesia with ASA Monitors.         Additional Monitors:     Airway Plan:            Plan Factors-Exercise tolerance (METS): >4 METS.    Chart reviewed.   Existing labs reviewed. Patient summary reviewed.                  Induction-     Postoperative Plan-         Informed Consent- Anesthetic plan and risks discussed with patient.  I personally reviewed this patient with the CRNA. Discussed and agreed on the Anesthesia Plan with the CRNA..

## 2025-01-07 ENCOUNTER — RESULTS FOLLOW-UP (OUTPATIENT)
Dept: FAMILY MEDICINE CLINIC | Facility: CLINIC | Age: 50
End: 2025-01-07

## 2025-01-07 PROCEDURE — 88305 TISSUE EXAM BY PATHOLOGIST: CPT | Performed by: PATHOLOGY

## 2025-01-08 ENCOUNTER — RESULTS FOLLOW-UP (OUTPATIENT)
Dept: GASTROENTEROLOGY | Facility: CLINIC | Age: 50
End: 2025-01-08

## 2025-01-15 ENCOUNTER — TELEPHONE (OUTPATIENT)
Age: 50
End: 2025-01-15

## 2025-01-15 DIAGNOSIS — K70.30 ALCOHOLIC CIRRHOSIS OF LIVER WITHOUT ASCITES (HCC): ICD-10-CM

## 2025-01-15 RX ORDER — PANTOPRAZOLE SODIUM 40 MG/1
40 TABLET, DELAYED RELEASE ORAL 2 TIMES DAILY
Qty: 180 TABLET | Refills: 1 | Status: SHIPPED | OUTPATIENT
Start: 2025-01-15

## 2025-01-15 NOTE — TELEPHONE ENCOUNTER
Should not be taking both medications as they are in the same family -- it looks like she should be on just Protonix. Refill sent

## 2025-01-15 NOTE — TELEPHONE ENCOUNTER
Phone call from patient requesting refill of medications.   Patient states he has been taking omeprazole 40mg 1 capsule by mouth 2 times a day and he has also been taking Pantoprazole 40mg by mouth 2 times a day.  Is patient suppose to be taking both medications because when I went to refill medications it looks like omeprazole was stopped and he was suppose to just be taking pantoprazole.  Please advise and call patient. Thank you.

## 2025-01-15 NOTE — TELEPHONE ENCOUNTER
Medication: pantoprazole (PROTONIX) 40 mg tablet     Dose/Frequency: Take 1 tablet (40 mg total) by mouth 2 (two) times a day     Quantity: 120    Pharmacy: [facility]    Office:   [x] PCP/Provider - Dr Billy  [] Speciality/Provider -     Does the patient have enough for 3 days?   [x] Yes   [] No - Send as HP to POD

## 2025-01-15 NOTE — TELEPHONE ENCOUNTER
Pt calling in to go over results from EGD and schedule follow up EGD in 4 weeks as directed by Dr. Schneider. Reviewed what esophageal varices are, how they are treated, and why he has to have a repeat EGD. Also went over colonoscopy results and when he should have his next one. Pt verbalized understanding. Requested a refill of his pantoprazole, refill order has already been pended. Warn transfer back to [place] for scheduling.

## 2025-01-21 DIAGNOSIS — R10.9 ABDOMINAL PAIN, UNSPECIFIED ABDOMINAL LOCATION: ICD-10-CM

## 2025-01-21 RX ORDER — DICYCLOMINE HYDROCHLORIDE 10 MG/1
10 CAPSULE ORAL 4 TIMES DAILY PRN
Qty: 360 CAPSULE | Refills: 1 | Status: SHIPPED | OUTPATIENT
Start: 2025-01-21

## 2025-02-10 ENCOUNTER — PREP FOR PROCEDURE (OUTPATIENT)
Dept: GASTROENTEROLOGY | Facility: CLINIC | Age: 50
End: 2025-02-10

## 2025-02-10 DIAGNOSIS — I85.00 ESOPHAGEAL VARICES WITHOUT BLEEDING, UNSPECIFIED ESOPHAGEAL VARICES TYPE (HCC): Primary | ICD-10-CM

## 2025-02-11 ENCOUNTER — HOSPITAL ENCOUNTER (OUTPATIENT)
Dept: GASTROENTEROLOGY | Facility: HOSPITAL | Age: 50
Setting detail: OUTPATIENT SURGERY
Discharge: HOME | End: 2025-02-11
Attending: INTERNAL MEDICINE
Payer: COMMERCIAL

## 2025-02-11 ENCOUNTER — ANESTHESIA EVENT (OUTPATIENT)
Dept: GASTROENTEROLOGY | Facility: HOSPITAL | Age: 50
End: 2025-02-11
Payer: COMMERCIAL

## 2025-02-11 ENCOUNTER — ANESTHESIA (OUTPATIENT)
Dept: GASTROENTEROLOGY | Facility: HOSPITAL | Age: 50
End: 2025-02-11
Payer: COMMERCIAL

## 2025-02-11 VITALS
SYSTOLIC BLOOD PRESSURE: 121 MMHG | TEMPERATURE: 97.9 F | HEART RATE: 83 BPM | HEIGHT: 69 IN | WEIGHT: 180.34 LBS | BODY MASS INDEX: 26.71 KG/M2 | DIASTOLIC BLOOD PRESSURE: 77 MMHG | OXYGEN SATURATION: 97 % | RESPIRATION RATE: 13 BRPM

## 2025-02-11 DIAGNOSIS — I85.00 ESOPHAGEAL VARICES WITHOUT BLEEDING, UNSPECIFIED ESOPHAGEAL VARICES TYPE (HCC): ICD-10-CM

## 2025-02-11 LAB — GLUCOSE SERPL-MCNC: 97 MG/DL (ref 65–140)

## 2025-02-11 PROCEDURE — 82948 REAGENT STRIP/BLOOD GLUCOSE: CPT

## 2025-02-11 PROCEDURE — 43244 EGD VARICES LIGATION: CPT | Performed by: INTERNAL MEDICINE

## 2025-02-11 RX ORDER — ONDANSETRON 2 MG/ML
INJECTION INTRAMUSCULAR; INTRAVENOUS
Status: DISPENSED
Start: 2025-02-11 | End: 2025-02-11

## 2025-02-11 RX ORDER — ONDANSETRON 2 MG/ML
4 INJECTION INTRAMUSCULAR; INTRAVENOUS EVERY 6 HOURS PRN
Status: DISCONTINUED | OUTPATIENT
Start: 2025-02-11 | End: 2025-02-15 | Stop reason: HOSPADM

## 2025-02-11 RX ORDER — LIDOCAINE HYDROCHLORIDE 20 MG/ML
INJECTION, SOLUTION EPIDURAL; INFILTRATION; INTRACAUDAL; PERINEURAL AS NEEDED
Status: DISCONTINUED | OUTPATIENT
Start: 2025-02-11 | End: 2025-02-11

## 2025-02-11 RX ORDER — SODIUM CHLORIDE, SODIUM LACTATE, POTASSIUM CHLORIDE, CALCIUM CHLORIDE 600; 310; 30; 20 MG/100ML; MG/100ML; MG/100ML; MG/100ML
50 INJECTION, SOLUTION INTRAVENOUS CONTINUOUS
Status: DISCONTINUED | OUTPATIENT
Start: 2025-02-11 | End: 2025-02-15 | Stop reason: HOSPADM

## 2025-02-11 RX ORDER — PROPOFOL 10 MG/ML
INJECTION, EMULSION INTRAVENOUS AS NEEDED
Status: DISCONTINUED | OUTPATIENT
Start: 2025-02-11 | End: 2025-02-11

## 2025-02-11 RX ADMIN — PROPOFOL 100 MG: 10 INJECTION, EMULSION INTRAVENOUS at 07:16

## 2025-02-11 RX ADMIN — LIDOCAINE HYDROCHLORIDE 100 MG: 20 INJECTION, SOLUTION EPIDURAL; INFILTRATION; INTRACAUDAL; PERINEURAL at 07:15

## 2025-02-11 RX ADMIN — PROPOFOL 45 MG: 10 INJECTION, EMULSION INTRAVENOUS at 07:20

## 2025-02-11 RX ADMIN — SODIUM CHLORIDE, SODIUM LACTATE, POTASSIUM CHLORIDE, AND CALCIUM CHLORIDE 50 ML/HR: .6; .31; .03; .02 INJECTION, SOLUTION INTRAVENOUS at 06:47

## 2025-02-11 RX ADMIN — PROPOFOL 100 MG: 10 INJECTION, EMULSION INTRAVENOUS at 07:17
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
## 2025-02-11 NOTE — ANESTHESIA POSTPROCEDURE EVALUATION
Post-Op Assessment Note    CV Status:  Stable  Pain Score: 0    Pain management: adequate       Mental Status:  Alert and awake   Hydration Status:  Euvolemic   PONV Controlled:  Controlled   Airway Patency:  Patent     Post Op Vitals Reviewed: Yes    No anethesia notable event occurred.    Staff: CRNA           Last Filed PACU Vitals:  Vitals Value Taken Time   Temp     Pulse     BP     Resp     SpO2

## 2025-02-12 ENCOUNTER — TELEPHONE (OUTPATIENT)
Age: 50
End: 2025-02-12

## 2025-02-12 DIAGNOSIS — R10.9 ABDOMINAL PAIN, UNSPECIFIED ABDOMINAL LOCATION: ICD-10-CM

## 2025-02-12 RX ORDER — DICYCLOMINE HYDROCHLORIDE 10 MG/1
10 CAPSULE ORAL 4 TIMES DAILY PRN
Qty: 90 CAPSULE | Refills: 1 | Status: SHIPPED | OUTPATIENT
Start: 2025-02-12

## 2025-02-12 NOTE — TELEPHONE ENCOUNTER
Patients GI provider:  Dr. Schneider    Number to return call: [phone]    Reason for call: Victoria on the consent called to check on the med prescribed after pts EGD done yesterday to  but [facility] on [address] does not have the orders. Pt requested the orders to be sent out to the pharmacy so the pt can  his med. Please contact pt for further assistance.    Scheduled procedure/appointment date if applicable: Apt 3/6/25

## 2025-02-17 DIAGNOSIS — K29.20 ACUTE ALCOHOLIC GASTRITIS WITHOUT HEMORRHAGE: ICD-10-CM

## 2025-02-17 DIAGNOSIS — K52.9 ENTERITIS: ICD-10-CM

## 2025-02-17 DIAGNOSIS — K70.30 ALCOHOLIC CIRRHOSIS OF LIVER WITHOUT ASCITES (HCC): ICD-10-CM

## 2025-02-17 RX ORDER — PANTOPRAZOLE SODIUM 40 MG/1
40 TABLET, DELAYED RELEASE ORAL DAILY
Qty: 90 TABLET | Refills: 1 | Status: SHIPPED | OUTPATIENT
Start: 2025-02-17

## 2025-02-18 ENCOUNTER — TELEPHONE (OUTPATIENT)
Dept: GASTROENTEROLOGY | Facility: CLINIC | Age: 50
End: 2025-02-18

## 2025-02-18 ENCOUNTER — TELEPHONE (OUTPATIENT)
Age: 50
End: 2025-02-18

## 2025-02-18 RX ORDER — ONDANSETRON 4 MG/1
4 TABLET, ORALLY DISINTEGRATING ORAL EVERY 6 HOURS PRN
Qty: 9 TABLET | Refills: 2 | OUTPATIENT
Start: 2025-02-18

## 2025-02-18 RX ORDER — FAMOTIDINE 10 MG/1
10 TABLET ORAL 2 TIMES DAILY PRN
Qty: 30 TABLET | Refills: 0 | OUTPATIENT
Start: 2025-02-18

## 2025-02-18 NOTE — TELEPHONE ENCOUNTER
Scheduled date of EGD(as of today):3/11/2025  Physician performing EGD:Dr Schneider  Location of EGD:[facility]  Instructions reviewed with patient by:sent via letter  Clearances: N/A

## 2025-02-18 NOTE — LETTER
DATE OF PROCEDURE:  __________________   TIME OF PROCEDURE: _____________      The OR/GI Lab will contact you the evening prior to your procedure with your exact arrival time.    We kindly ask that you immediately notify us of any need to cancel or reschedule your procedure.      DIETARY INSTRUCTIONS:    Day prior to the procedure:    You may consume your regular diet  Nothing to eat after midnight  Day of the procedure:  Clear liquids only including:  Soda  Water  Broth Gatorade  Jell-O  Popsicles Coffee/tea without milk/creamer   YOU MAY NOT HAVE:  Solid foods   Milk and milk products    Juice with pulp  Nothing to drink beginning 4 hours prior to the procedure time    NOTHING TO EAT AFTER MIDNIGHT & NOTHING TO DRINK 4 HOURS PRIOR TO PROCEDURE TIME    DAY OF THE PROCEDURE:  You may brush your teeth.  Leave all jewelry at home.  Take out any facial piercings, if able.   Please arrive for your procedure as indicated by the OR / GI Lab / Endoscopy Unit. The hospital will contact you the day before with your exact arrival time.   Make sure you have arranged ahead of time for a responsible adult (18 or older) to accompany and drive you home after the procedure.  Please discuss any transportation concerns with our staff prior to your procedure.    The effects of the anesthesia can persist for 24 hours.  After receiving the sedation, you must exercise caution before engaging in any activity that could harm yourself and others (such as driving a car).  Do not make any important decisions or do not drink any alcoholic beverages during this time period.  After your procedure, you may have anything you'd like to eat or drink.  You will probably want to start with something light.  Please include plenty of fluids.  Avoid items that cause gas such as sodas and salads.      SPECIAL INSTRUCTIONS:  Blood thinner (i.e. - Coumadin, Pradaxa, Lovenox, Xarelto, Eliquis)  ?  Continue (Do Not  Stop)  ? Stop______________for_____________days prior to the procedure.  ?  Please discuss with your PCP or Cardiologist and forward instructions to our office    Antiplatelet (i.e. - Plavix, Aggrenox, Effient, Brilinta)  ?  Continue (Do Not Stop)  ? Stop______________for_____________days prior to the procedure.  ?  Please discuss with your prescribing physician and forward instructions to our office     Diabetes:   If you are Diabetic please see separate Diabetic Instruction Sheet          Prescribed medications:  Do not stop your aspirin, or any of your other medications.  Take the rest of your prescribed medications with sips of water at least 2 hours prior to your appointment.     For any questions or concerns related to your pre-procedure instructions, please contact our office.  Thank you for choosing [facility]!

## 2025-02-18 NOTE — TELEPHONE ENCOUNTER
PA for pantoprazole (PROTONIX) 40 mg tablet SUBMITTED to     via    []CMM-KEY:   []Surescripts-Case ID #   []Availity-Auth ID # NDC #   []Faxed to plan   [x]Other website PromptPA [facility] - [place] [id number]  []Phone call Case ID #     [x]PA sent as URGENT    All office notes, labs and other pertaining documents and studies sent. Clinical questions answered. Awaiting determination from insurance company.     Turnaround time for your insurance to make a decision on your Prior Authorization can take 7-21 business days.

## 2025-03-06 ENCOUNTER — TELEMEDICINE (OUTPATIENT)
Dept: GASTROENTEROLOGY | Facility: CLINIC | Age: 50
End: 2025-03-06
Payer: COMMERCIAL

## 2025-03-06 VITALS
SYSTOLIC BLOOD PRESSURE: 148 MMHG | HEART RATE: 117 BPM | BODY MASS INDEX: 29.47 KG/M2 | WEIGHT: 199 LBS | HEIGHT: 69 IN | TEMPERATURE: 98.1 F | DIASTOLIC BLOOD PRESSURE: 83 MMHG

## 2025-03-06 DIAGNOSIS — K74.60 CIRRHOSIS OF LIVER WITHOUT ASCITES, UNSPECIFIED HEPATIC CIRRHOSIS TYPE (HCC): Primary | ICD-10-CM

## 2025-03-06 DIAGNOSIS — R00.0 TACHYCARDIA: ICD-10-CM

## 2025-03-06 PROCEDURE — 99214 OFFICE O/P EST MOD 30 MIN: CPT | Performed by: INTERNAL MEDICINE

## 2025-03-06 NOTE — PROGRESS NOTES
Name: Robert Combs      : 1975      MRN: [id number]  Encounter Provider: Louie Schneider MD  Encounter Date: 3/6/2025   Encounter department: [facility] [place]  :  Assessment & Plan  Cirrhosis of liver without ascites, unspecified hepatic cirrhosis type (HCC)  Continues to drink alcohol.  3 beers per day.  Varices: Plan for EGD next week for possible rebanding.  Ascites: No significant symptomatic ascites no previous imaging did have small knot of ascites  Hepatic encephalopathy: None today.  No asterixis on physical exam.  HCC screening: Last ultrasound in .  Plan for cross-sectional imaging in the summertime  Transplant cancer: Likely not a candidate given active alcohol use.  He is working on quitting alcohol but he uses this to cover up his pain in his back.  I let him know he should see a pain specialist for the pain in the back.  He also suffers from significant anxiety for which he uses alcohol to help treat.  He should also see a psychiatrist.  His liver enzymes are indicative of alcoholic hepatitis.  Will repeat labs including CBC, CMP and INR and calculate Madrey discriminant function based on that.    MELD 3.0: 27 at 2024  4:55 AM  MELD-Na: 27 at 2024  4:55 AM  Calculated from:  Serum Creatinine: 0.65 mg/dL (Using min of 1 mg/dL) at 2024  4:55 AM  Serum Sodium: 133 mmol/L at 2024  4:55 AM  Total Bilirubin: 7.21 mg/dL at 2024  4:55 AM  Serum Albumin: 2.5 g/dL at 2024  4:55 AM  INR(ratio): 2.56 at 2024  8:41 PM  Age at listing (hypothetical): 49 years  Sex: Male at 2024  4:55 AM      Orders:    CBC and Platelet; Future    Comprehensive metabolic panel; Future    Protime-INR; Future    Tachycardia  Patient's wife reports heart rate is 117.  No chest pain or shortness of breath.  Unclear etiology.  I let them know they should recheck the blood pressure at different times of the day on multiple days and give a  recording to the PCP.  This could be in the setting of anxiety.  Sometimes alcohol can cause this as well or alcohol withdrawal.  May benefit from cardiology referral in the future if persists.           History of Present Illness   Robert Combs is a 49 y.o. male who presents for follow-up.  Overall doing well.  Spouse inquires about tachycardia.  Actively drinking 3 beers per day.  No abdominal pain nausea vomiting.  Has gained 15 to 20 pounds in the past couple months due to eating well.  He feels better overall but is still significant anxiety and back pain.  HPI    Review of Systems A complete review of systems is negative other than that noted above in the HPI.      Current Outpatient Medications   Medication Sig Dispense Refill    Blood Glucose Monitoring Suppl (OneTouch Verio Reflect) w/Device KIT Check blood sugars twice daily. Please substitute with appropriate alternative as covered by patient's insurance. Dx: E11.65 1 kit 0    clonazePAM (KlonoPIN) 0.5 mg tablet Take 0.5 mg by mouth 3 (three) times a day as needed for anxiety      dicyclomine (BENTYL) 10 mg capsule Take 1 capsule (10 mg total) by mouth 4 (four) times a day as needed (0) 90 capsule 1    famotidine (PEPCID) 10 mg tablet Take 1 tablet (10 mg total) by mouth 2 (two) times a day as needed for heartburn 30 tablet 0    folic acid (FOLVITE) 1 mg tablet Take 1 tablet (1 mg total) by mouth daily for 14 days 14 tablet 0    Gluc-Chonn-MSM-Boswellia-Vit D (GLUCOSAMINE CHONDROITIN + D3 PO) Take by mouth      lactulose (CHRONULAC) 10 g/15 mL solution TAKE 30 ML (20 G) BY MOUTH TWICE A DAY 5400 mL 1    Lancets (OneTouch Delica Plus [id number]) MISC CHECK BLOOD SUGARS TWICE DAILY. PLEASE SUBSTITUTE WITH APPROPRIATE ALTERNATIVE AS COVERED BY PATIENT'S INSURANCE. DX: E11.65 200 each 3    multivitamin (THERAGRAN) TABS Take 1 tablet by mouth daily      NON FORMULARY Fiber vitamin b supplement      NON FORMULARY Iron, b vitamin, zinc gummies      pantoprazole  "(PROTONIX) 40 mg tablet TAKE 1 TABLET BY MOUTH EVERY DAY 90 tablet 1    Probiotic Product (PROBIOTIC DAILY PO) Take by mouth      spironolactone (ALDACTONE) 50 mg tablet Take 1 tablet (50 mg total) by mouth daily 30 tablet 3    Thiamine Mononitrate (VITAMIN B1) 100 mg tablet Take 1 tablet (100 mg total) by mouth daily 14 tablet 0    traZODone (DESYREL) 150 mg tablet Take 150 mg by mouth daily at bedtime  0    amLODIPine (NORVASC) 2.5 mg tablet Take 2.5 mg by mouth daily (Patient not taking: Reported on 12/11/2024)      Ketotifen Fumarate (ZADITOR) 0.035 % ophthalmic solution instill 1 drop into both eyes twice a day (Patient not taking: Reported on 8/26/2024)      Melatonin 10 MG CHEW Chew (Patient not taking: Reported on 3/6/2025)      ondansetron (ZOFRAN) 4 mg tablet Take 1 tablet (4 mg total) by mouth every 8 (eight) hours as needed for nausea or vomiting (Patient not taking: Reported on 3/6/2025) 20 tablet 0    ondansetron (ZOFRAN-ODT) 4 mg disintegrating tablet Take 1 tablet (4 mg total) by mouth every 6 (six) hours as needed for nausea or vomiting (Patient not taking: Reported on 3/6/2025) 20 tablet 0     No current facility-administered medications for this visit.     Objective   /83 (BP Location: Left arm, Patient Position: Sitting, Cuff Size: Standard)   Pulse (!) 117 Comment: n/a virtual visit  Temp 98.1 °F (36.7 °C) (Oral)   Ht 5' 9\" (1.753 m)   Wt 90.3 kg (199 lb)   BMI 29.39 kg/m²     Physical Exam     Physical Exam:   GENERAL: NAD  HEENT:  NC/AT, MMM, no scleral icterus  CARDIAC:  No JVD  PULMONARY:  non-labored breathing, no conversational dyspnea  ABDOMEN:  Non-distended  Extremities:  No edema, cyanosis, or clubbing  NEUROLOGIC:  Alert/oriented x3.   SKIN:  No rashes or erythema          Lab Results: I personally reviewed relevant lab results.       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      RECOMMENDATION:  Repeat EGD in 4 weeks for repeat variceal banding            Louie Schneider MD

## 2025-03-06 NOTE — H&P (VIEW-ONLY)
Name: Robert Combs      : 1975      MRN: [id number]  Encounter Provider: Louie Schneider MD  Encounter Date: 3/6/2025   Encounter department: [facility] [place]  :  Assessment & Plan  Cirrhosis of liver without ascites, unspecified hepatic cirrhosis type (HCC)  Continues to drink alcohol.  3 beers per day.  Varices: Plan for EGD next week for possible rebanding.  Ascites: No significant symptomatic ascites no previous imaging did have small knot of ascites  Hepatic encephalopathy: None today.  No asterixis on physical exam.  HCC screening: Last ultrasound in .  Plan for cross-sectional imaging in the summertime  Transplant cancer: Likely not a candidate given active alcohol use.  He is working on quitting alcohol but he uses this to cover up his pain in his back.  I let him know he should see a pain specialist for the pain in the back.  He also suffers from significant anxiety for which he uses alcohol to help treat.  He should also see a psychiatrist.  His liver enzymes are indicative of alcoholic hepatitis.  Will repeat labs including CBC, CMP and INR and calculate Madrey discriminant function based on that.    MELD 3.0: 27 at 2024  4:55 AM  MELD-Na: 27 at 2024  4:55 AM  Calculated from:  Serum Creatinine: 0.65 mg/dL (Using min of 1 mg/dL) at 2024  4:55 AM  Serum Sodium: 133 mmol/L at 2024  4:55 AM  Total Bilirubin: 7.21 mg/dL at 2024  4:55 AM  Serum Albumin: 2.5 g/dL at 2024  4:55 AM  INR(ratio): 2.56 at 2024  8:41 PM  Age at listing (hypothetical): 49 years  Sex: Male at 2024  4:55 AM      Orders:    CBC and Platelet; Future    Comprehensive metabolic panel; Future    Protime-INR; Future    Tachycardia  Patient's wife reports heart rate is 117.  No chest pain or shortness of breath.  Unclear etiology.  I let them know they should recheck the blood pressure at different times of the day on multiple days and give a  recording to the PCP.  This could be in the setting of anxiety.  Sometimes alcohol can cause this as well or alcohol withdrawal.  May benefit from cardiology referral in the future if persists.           History of Present Illness   Robert Combs is a 49 y.o. male who presents for follow-up.  Overall doing well.  Spouse inquires about tachycardia.  Actively drinking 3 beers per day.  No abdominal pain nausea vomiting.  Has gained 15 to 20 pounds in the past couple months due to eating well.  He feels better overall but is still significant anxiety and back pain.  HPI    Review of Systems A complete review of systems is negative other than that noted above in the HPI.      Current Outpatient Medications   Medication Sig Dispense Refill    Blood Glucose Monitoring Suppl (OneTouch Verio Reflect) w/Device KIT Check blood sugars twice daily. Please substitute with appropriate alternative as covered by patient's insurance. Dx: E11.65 1 kit 0    clonazePAM (KlonoPIN) 0.5 mg tablet Take 0.5 mg by mouth 3 (three) times a day as needed for anxiety      dicyclomine (BENTYL) 10 mg capsule Take 1 capsule (10 mg total) by mouth 4 (four) times a day as needed (0) 90 capsule 1    famotidine (PEPCID) 10 mg tablet Take 1 tablet (10 mg total) by mouth 2 (two) times a day as needed for heartburn 30 tablet 0    folic acid (FOLVITE) 1 mg tablet Take 1 tablet (1 mg total) by mouth daily for 14 days 14 tablet 0    Gluc-Chonn-MSM-Boswellia-Vit D (GLUCOSAMINE CHONDROITIN + D3 PO) Take by mouth      lactulose (CHRONULAC) 10 g/15 mL solution TAKE 30 ML (20 G) BY MOUTH TWICE A DAY 5400 mL 1    Lancets (OneTouch Delica Plus [id number]) MISC CHECK BLOOD SUGARS TWICE DAILY. PLEASE SUBSTITUTE WITH APPROPRIATE ALTERNATIVE AS COVERED BY PATIENT'S INSURANCE. DX: E11.65 200 each 3    multivitamin (THERAGRAN) TABS Take 1 tablet by mouth daily      NON FORMULARY Fiber vitamin b supplement      NON FORMULARY Iron, b vitamin, zinc gummies      pantoprazole  "(PROTONIX) 40 mg tablet TAKE 1 TABLET BY MOUTH EVERY DAY 90 tablet 1    Probiotic Product (PROBIOTIC DAILY PO) Take by mouth      spironolactone (ALDACTONE) 50 mg tablet Take 1 tablet (50 mg total) by mouth daily 30 tablet 3    Thiamine Mononitrate (VITAMIN B1) 100 mg tablet Take 1 tablet (100 mg total) by mouth daily 14 tablet 0    traZODone (DESYREL) 150 mg tablet Take 150 mg by mouth daily at bedtime  0    amLODIPine (NORVASC) 2.5 mg tablet Take 2.5 mg by mouth daily (Patient not taking: Reported on 12/11/2024)      Ketotifen Fumarate (ZADITOR) 0.035 % ophthalmic solution instill 1 drop into both eyes twice a day (Patient not taking: Reported on 8/26/2024)      Melatonin 10 MG CHEW Chew (Patient not taking: Reported on 3/6/2025)      ondansetron (ZOFRAN) 4 mg tablet Take 1 tablet (4 mg total) by mouth every 8 (eight) hours as needed for nausea or vomiting (Patient not taking: Reported on 3/6/2025) 20 tablet 0    ondansetron (ZOFRAN-ODT) 4 mg disintegrating tablet Take 1 tablet (4 mg total) by mouth every 6 (six) hours as needed for nausea or vomiting (Patient not taking: Reported on 3/6/2025) 20 tablet 0     No current facility-administered medications for this visit.     Objective   /83 (BP Location: Left arm, Patient Position: Sitting, Cuff Size: Standard)   Pulse (!) 117 Comment: n/a virtual visit  Temp 98.1 °F (36.7 °C) (Oral)   Ht 5' 9\" (1.753 m)   Wt 90.3 kg (199 lb)   BMI 29.39 kg/m²     Physical Exam     Physical Exam:   GENERAL: NAD  HEENT:  NC/AT, MMM, no scleral icterus  CARDIAC:  No JVD  PULMONARY:  non-labored breathing, no conversational dyspnea  ABDOMEN:  Non-distended  Extremities:  No edema, cyanosis, or clubbing  NEUROLOGIC:  Alert/oriented x3.   SKIN:  No rashes or erythema          Lab Results: I personally reviewed relevant lab results.       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      RECOMMENDATION:  Repeat EGD in 4 weeks for repeat variceal banding            Louie Schneider MD

## 2025-03-11 ENCOUNTER — HOSPITAL ENCOUNTER (OUTPATIENT)
Dept: GASTROENTEROLOGY | Facility: HOSPITAL | Age: 50
Setting detail: OUTPATIENT SURGERY
Discharge: HOME | End: 2025-03-11
Attending: INTERNAL MEDICINE
Payer: COMMERCIAL

## 2025-03-11 ENCOUNTER — ANESTHESIA (OUTPATIENT)
Dept: GASTROENTEROLOGY | Facility: HOSPITAL | Age: 50
End: 2025-03-11
Payer: COMMERCIAL

## 2025-03-11 ENCOUNTER — ANESTHESIA EVENT (OUTPATIENT)
Dept: GASTROENTEROLOGY | Facility: HOSPITAL | Age: 50
End: 2025-03-11
Payer: COMMERCIAL

## 2025-03-11 VITALS
WEIGHT: 201.94 LBS | OXYGEN SATURATION: 96 % | DIASTOLIC BLOOD PRESSURE: 70 MMHG | HEART RATE: 83 BPM | TEMPERATURE: 97.6 F | HEIGHT: 69 IN | SYSTOLIC BLOOD PRESSURE: 113 MMHG | RESPIRATION RATE: 16 BRPM | BODY MASS INDEX: 29.91 KG/M2

## 2025-03-11 DIAGNOSIS — I85.00 ESOPHAGEAL VARICES WITHOUT BLEEDING, UNSPECIFIED ESOPHAGEAL VARICES TYPE (HCC): ICD-10-CM

## 2025-03-11 LAB — GLUCOSE SERPL-MCNC: 91 MG/DL (ref 65–140)

## 2025-03-11 PROCEDURE — 43244 EGD VARICES LIGATION: CPT | Performed by: INTERNAL MEDICINE

## 2025-03-11 PROCEDURE — 82948 REAGENT STRIP/BLOOD GLUCOSE: CPT

## 2025-03-11 RX ORDER — LIDOCAINE HYDROCHLORIDE 20 MG/ML
INJECTION, SOLUTION EPIDURAL; INFILTRATION; INTRACAUDAL; PERINEURAL AS NEEDED
Status: DISCONTINUED | OUTPATIENT
Start: 2025-03-11 | End: 2025-03-11

## 2025-03-11 RX ORDER — SODIUM CHLORIDE, SODIUM LACTATE, POTASSIUM CHLORIDE, CALCIUM CHLORIDE 600; 310; 30; 20 MG/100ML; MG/100ML; MG/100ML; MG/100ML
INJECTION, SOLUTION INTRAVENOUS CONTINUOUS PRN
Status: DISCONTINUED | OUTPATIENT
Start: 2025-03-11 | End: 2025-03-11

## 2025-03-11 RX ORDER — PROPOFOL 10 MG/ML
INJECTION, EMULSION INTRAVENOUS AS NEEDED
Status: DISCONTINUED | OUTPATIENT
Start: 2025-03-11 | End: 2025-03-11

## 2025-03-11 RX ADMIN — LIDOCAINE HYDROCHLORIDE 100 MG: 20 INJECTION, SOLUTION EPIDURAL; INFILTRATION; INTRACAUDAL at 07:17

## 2025-03-11 RX ADMIN — SODIUM CHLORIDE, SODIUM LACTATE, POTASSIUM CHLORIDE, AND CALCIUM CHLORIDE: .6; .31; .03; .02 INJECTION, SOLUTION INTRAVENOUS at 07:13

## 2025-03-11 RX ADMIN — PROPOFOL 25 MG: 10 INJECTION, EMULSION INTRAVENOUS at 07:23

## 2025-03-11 RX ADMIN — PROPOFOL 100 MG: 10 INJECTION, EMULSION INTRAVENOUS at 07:18

## 2025-03-11 RX ADMIN — PROPOFOL 25 MG: 10 INJECTION, EMULSION INTRAVENOUS at 07:20

## 2025-03-11 RX ADMIN — PROPOFOL 75 MG: 10 INJECTION, EMULSION INTRAVENOUS at 07:19

## 2025-03-11 NOTE — ANESTHESIA POSTPROCEDURE EVALUATION
Post-Op Assessment Note    CV Status:  Stable  Pain Score: 0    Pain management: adequate       Mental Status:  Sleepy and arousable   Hydration Status:  Euvolemic   PONV Controlled:  Controlled   Airway Patency:  Patent     Post Op Vitals Reviewed: Yes    No anethesia notable event occurred.    Staff: CRNA           Vitals:    03/11/25 0750   BP: 113/70   Pulse: 83   Resp: 16   Temp:    SpO2: 96%           Modified Magen:     Vitals Value Taken Time   Activity 2 03/11/25 0750   Respiration 2 03/11/25 0750   Circulation 2 03/11/25 0750   Consciousness 2 03/11/25 0750   Oxygen Saturation 2 03/11/25 0750     Modified Magen Score: 10

## 2025-03-11 NOTE — PERIOPERATIVE NURSING NOTE
Pt c/o epigastric pain, Dr. Schneider notified - discomfort is expected to to multiple bands placed, he will order carafate for pt to  at the pharmacy today. Pt verbalizes understanding.

## 2025-03-11 NOTE — INTERVAL H&P NOTE
H&P reviewed. After examining the patient I find no changes in the patients condition since the H&P had been written.    Vitals:    03/11/25 0643   BP: 134/81   Pulse: 100   Resp: 16   Temp: 97.9 °F (36.6 °C)   SpO2: 98%

## 2025-03-12 ENCOUNTER — TELEPHONE (OUTPATIENT)
Age: 50
End: 2025-03-12

## 2025-03-12 DIAGNOSIS — K22.89 PAIN OF ESOPHAGUS: Primary | ICD-10-CM

## 2025-03-12 RX ORDER — SUCRALFATE 1 G/1
1 TABLET ORAL 4 TIMES DAILY
Qty: 56 TABLET | Refills: 0 | Status: SHIPPED | OUTPATIENT
Start: 2025-03-12 | End: 2025-03-26

## 2025-03-12 RX ORDER — LIDOCAINE HYDROCHLORIDE 20 MG/ML
15 SOLUTION OROPHARYNGEAL 4 TIMES DAILY PRN
Qty: 400 ML | Refills: 0 | Status: SHIPPED | OUTPATIENT
Start: 2025-03-12

## 2025-03-12 NOTE — TELEPHONE ENCOUNTER
Patients GI provider:  Dr. Schneider     Number to return call: ([phone]     Reason for call: Pt calling stating the doctor advised he would send in medication after the procedure and his pharmacy does not have anything.  Please confirm and send medication to pharmacy on file.    Scheduled procedure/appointment date if applicable: Apt/procedure

## 2025-03-13 NOTE — PROGRESS NOTES
Name: Robert Combs      : 1975      MRN: [id number]  Encounter Provider: Yolanda Billy DO  Encounter Date: 3/14/2025   Encounter department: [facility]  :  Assessment & Plan  Chronic bilateral low back pain without sciatica  Chronic, worsening. Point tenderness over L-spine and significant spasm in B/L paraspinals. Will XR to evaluate for bony changes given previous imaging >5 years ago and pt has had multiple falls recently. Will also refer to PT, chiropractic care, and Spine & Pain for symptom management. Pt is limited in OTC options for pain relief -- should not be taking much Tylenol/Motrin due to liver injury, abnormal blood counts, varices in setting of alcohol use. Encouraged supportive care with lidocaine patches.   Orders:  •  XR spine lumbar minimum 4 views non injury; Future  •  Ambulatory Referral to Physical Therapy; Future  •  Ambulatory Referral to Chiropractic; Future  •  Ambulatory referral to Spine & Pain Management; Future    Alcohol use disorder, severe, dependence (HCC)  Chronic, managed by GI. Will update vitamin, iron, and thyroid levels as these have not be checked in some time.  Orders:  •  Iron Panel (Includes Ferritin, Iron Sat%, Iron, and TIBC); Future  •  Vitamin D 25 hydroxy; Future  •  Vitamin B12; Future  •  Vitamin B1, whole blood; Future  •  Folate; Future  •  TSH, 3rd generation with Free T4 reflex; Future    Palpitations  Chronic tachycardia. May be secondary to co-morbid conditions, but as it has not been previously evaluated, will refer to Cardio for input   Orders:  •  Ambulatory referral to Cardiology; Future    Healthcare maintenance  BP WNL   Lipids to screen for HLD   CRC UTD  Vaccinations: Pneumo defers, TDap UTD, Shingles defers  Encouraged regular physical activity, varied diet, and regular dental/eye exams          Screening, lipid    Orders:  •  Lipid panel; Future           History of Present Illness   HPI    Has been having pain in  "his low back -- has slowly been getting worse over time   Midline, but radiates out to both sides   No radiation, numbness/tingling, GI/ symptoms  Uses lidocaine patches in the past   XR L-spine 2019 -- minimal degenerative changes at L4-L5 and muscle spasm   Notes he had a few falls on the ice recently     Review of Systems   Constitutional:  Negative for unexpected weight change.   HENT:  Positive for congestion. Negative for ear pain, rhinorrhea and sore throat.    Eyes:  Positive for visual disturbance (\"my vision's going rapidly\").   Respiratory:  Negative for cough and shortness of breath.    Cardiovascular:  Positive for palpitations. Negative for chest pain and leg swelling.   Gastrointestinal:  Negative for abdominal pain, blood in stool, constipation and diarrhea.        (+) pain from variceal intervention   Endocrine: Negative for polyuria.   Genitourinary:  Negative for dysuria and hematuria.   Musculoskeletal:  Positive for back pain.   Neurological:  Positive for headaches (generalized pressure). Negative for dizziness and numbness.   Psychiatric/Behavioral:  Positive for sleep disturbance (restless sleep).        Objective   /78   Pulse (!) 118   Temp 98.9 °F (37.2 °C)   Ht 5' 9\" (1.753 m)   Wt 93.9 kg (207 lb)   SpO2 97%   BMI 30.57 kg/m²      Physical Exam  Vitals and nursing note reviewed.   Constitutional:       General: He is not in acute distress.     Appearance: Normal appearance. He is well-developed.   HENT:      Head: Normocephalic and atraumatic.      Right Ear: Tympanic membrane, ear canal and external ear normal.      Left Ear: Tympanic membrane, ear canal and external ear normal.      Nose: Nose normal. No rhinorrhea.      Mouth/Throat:      Mouth: Mucous membranes are moist.      Pharynx: No oropharyngeal exudate or posterior oropharyngeal erythema.   Eyes:      Conjunctiva/sclera: Conjunctivae normal.   Neck:      Thyroid: No thyromegaly.   Cardiovascular:      Rate and " Rhythm: Normal rate and regular rhythm.   Pulmonary:      Effort: Pulmonary effort is normal. No respiratory distress.      Breath sounds: Normal breath sounds.   Abdominal:      General: Bowel sounds are normal. There is distension.      Palpations: Abdomen is soft.      Tenderness: There is no abdominal tenderness.   Musculoskeletal:      Right lower leg: No edema.      Left lower leg: No edema.      Comments: Tender to palpation over L-spine with significant spasm in lumbar paraspinals   B/L LE strength, sensation intact   Lymphadenopathy:      Cervical: No cervical adenopathy.   Skin:     General: Skin is warm and dry.   Neurological:      Mental Status: He is alert.      Comments: Grossly intact   Psychiatric:         Mood and Affect: Mood normal.

## 2025-03-14 ENCOUNTER — HOSPITAL ENCOUNTER (EMERGENCY)
Facility: HOSPITAL | Age: 50
Discharge: HOME | End: 2025-03-14
Attending: EMERGENCY MEDICINE
Payer: COMMERCIAL

## 2025-03-14 ENCOUNTER — NURSE TRIAGE (OUTPATIENT)
Age: 50
End: 2025-03-14

## 2025-03-14 ENCOUNTER — APPOINTMENT (EMERGENCY)
Dept: RADIOLOGY | Facility: HOSPITAL | Age: 50
End: 2025-03-14
Payer: COMMERCIAL

## 2025-03-14 ENCOUNTER — APPOINTMENT (OUTPATIENT)
Dept: RADIOLOGY | Facility: CLINIC | Age: 50
End: 2025-03-14
Payer: COMMERCIAL

## 2025-03-14 ENCOUNTER — RESULTS FOLLOW-UP (OUTPATIENT)
Dept: FAMILY MEDICINE CLINIC | Facility: CLINIC | Age: 50
End: 2025-03-14

## 2025-03-14 ENCOUNTER — OFFICE VISIT (OUTPATIENT)
Dept: FAMILY MEDICINE CLINIC | Facility: CLINIC | Age: 50
End: 2025-03-14
Payer: COMMERCIAL

## 2025-03-14 ENCOUNTER — APPOINTMENT (OUTPATIENT)
Dept: LAB | Facility: CLINIC | Age: 50
End: 2025-03-14
Payer: COMMERCIAL

## 2025-03-14 VITALS
BODY MASS INDEX: 30.66 KG/M2 | SYSTOLIC BLOOD PRESSURE: 126 MMHG | DIASTOLIC BLOOD PRESSURE: 78 MMHG | OXYGEN SATURATION: 97 % | HEART RATE: 118 BPM | WEIGHT: 207 LBS | TEMPERATURE: 98.9 F | HEIGHT: 69 IN

## 2025-03-14 VITALS
WEIGHT: 201.28 LBS | SYSTOLIC BLOOD PRESSURE: 124 MMHG | HEART RATE: 96 BPM | OXYGEN SATURATION: 96 % | TEMPERATURE: 97.4 F | RESPIRATION RATE: 20 BRPM | BODY MASS INDEX: 29.72 KG/M2 | DIASTOLIC BLOOD PRESSURE: 84 MMHG

## 2025-03-14 DIAGNOSIS — M54.50 CHRONIC BILATERAL LOW BACK PAIN WITHOUT SCIATICA: ICD-10-CM

## 2025-03-14 DIAGNOSIS — G89.29 CHRONIC BILATERAL LOW BACK PAIN WITHOUT SCIATICA: ICD-10-CM

## 2025-03-14 DIAGNOSIS — M54.50 CHRONIC BILATERAL LOW BACK PAIN WITHOUT SCIATICA: Primary | ICD-10-CM

## 2025-03-14 DIAGNOSIS — Z13.220 SCREENING, LIPID: ICD-10-CM

## 2025-03-14 DIAGNOSIS — R07.9 CHEST PAIN, UNSPECIFIED TYPE: Primary | ICD-10-CM

## 2025-03-14 DIAGNOSIS — K74.60 CIRRHOSIS OF LIVER WITHOUT ASCITES, UNSPECIFIED HEPATIC CIRRHOSIS TYPE (HCC): ICD-10-CM

## 2025-03-14 DIAGNOSIS — R00.2 PALPITATIONS: ICD-10-CM

## 2025-03-14 DIAGNOSIS — G89.29 CHRONIC BILATERAL LOW BACK PAIN WITHOUT SCIATICA: Primary | ICD-10-CM

## 2025-03-14 DIAGNOSIS — F10.20 ALCOHOL USE DISORDER, SEVERE, DEPENDENCE (HCC): ICD-10-CM

## 2025-03-14 DIAGNOSIS — Z00.00 HEALTHCARE MAINTENANCE: ICD-10-CM

## 2025-03-14 PROBLEM — K29.20 ACUTE ALCOHOLIC GASTRITIS WITHOUT HEMORRHAGE: Status: RESOLVED | Noted: 2024-11-02 | Resolved: 2025-03-14

## 2025-03-14 PROBLEM — F10.10 ALCOHOL ABUSE, DAILY USE: Status: RESOLVED | Noted: 2019-11-06 | Resolved: 2025-03-14

## 2025-03-14 PROBLEM — T14.91XA SUICIDAL BEHAVIOR WITH ATTEMPTED SELF-INJURY (HCC): Status: RESOLVED | Noted: 2024-07-09 | Resolved: 2025-03-14

## 2025-03-14 PROBLEM — K52.9 ENTERITIS: Status: RESOLVED | Noted: 2024-11-01 | Resolved: 2025-03-14

## 2025-03-14 PROBLEM — R11.2 INTRACTABLE NAUSEA AND VOMITING: Status: RESOLVED | Noted: 2024-12-08 | Resolved: 2025-03-14

## 2025-03-14 PROBLEM — Z53.29 LEFT AGAINST MEDICAL ADVICE: Status: RESOLVED | Noted: 2019-11-06 | Resolved: 2025-03-14

## 2025-03-14 PROBLEM — F19.10 POLYSUBSTANCE ABUSE (HCC): Status: RESOLVED | Noted: 2024-11-03 | Resolved: 2025-03-14

## 2025-03-14 PROBLEM — D69.6 THROMBOCYTOPENIA (HCC): Status: RESOLVED | Noted: 2024-07-09 | Resolved: 2025-03-14

## 2025-03-14 PROBLEM — M54.42 CHRONIC BILATERAL LOW BACK PAIN WITH BILATERAL SCIATICA: Status: RESOLVED | Noted: 2019-11-06 | Resolved: 2025-03-14

## 2025-03-14 PROBLEM — F10.921 ALCOHOL INTOXICATION WITH DELIRIUM (HCC): Status: RESOLVED | Noted: 2024-07-09 | Resolved: 2025-03-14

## 2025-03-14 PROBLEM — M54.41 CHRONIC BILATERAL LOW BACK PAIN WITH BILATERAL SCIATICA: Status: RESOLVED | Noted: 2019-11-06 | Resolved: 2025-03-14

## 2025-03-14 LAB
2HR DELTA HS TROPONIN: 0 NG/L
ALBUMIN SERPL BCG-MCNC: 2.8 G/DL (ref 3.5–5)
ALP SERPL-CCNC: 133 U/L (ref 34–104)
ALT SERPL W P-5'-P-CCNC: 28 U/L (ref 7–52)
ANION GAP SERPL CALCULATED.3IONS-SCNC: 4 MMOL/L (ref 4–13)
ANION GAP SERPL CALCULATED.3IONS-SCNC: 6 MMOL/L (ref 4–13)
AST SERPL W P-5'-P-CCNC: 66 U/L (ref 13–39)
BASOPHILS # BLD AUTO: 0.02 THOUSANDS/ÂΜL (ref 0–0.1)
BASOPHILS NFR BLD AUTO: 1 % (ref 0–1)
BILIRUB SERPL-MCNC: 3.32 MG/DL (ref 0.2–1)
BUN SERPL-MCNC: 5 MG/DL (ref 5–25)
BUN SERPL-MCNC: 6 MG/DL (ref 5–25)
CALCIUM ALBUM COR SERPL-MCNC: 9 MG/DL (ref 8.3–10.1)
CALCIUM SERPL-MCNC: 8 MG/DL (ref 8.4–10.2)
CALCIUM SERPL-MCNC: 8 MG/DL (ref 8.4–10.2)
CARDIAC TROPONIN I PNL SERPL HS: 4 NG/L (ref ?–50)
CARDIAC TROPONIN I PNL SERPL HS: 4 NG/L (ref ?–50)
CHLORIDE SERPL-SCNC: 105 MMOL/L (ref 96–108)
CHLORIDE SERPL-SCNC: 108 MMOL/L (ref 96–108)
CO2 SERPL-SCNC: 25 MMOL/L (ref 21–32)
CO2 SERPL-SCNC: 25 MMOL/L (ref 21–32)
CREAT SERPL-MCNC: 0.47 MG/DL (ref 0.6–1.3)
CREAT SERPL-MCNC: 0.48 MG/DL (ref 0.6–1.3)
EOSINOPHIL # BLD AUTO: 0.16 THOUSAND/ÂΜL (ref 0–0.61)
EOSINOPHIL NFR BLD AUTO: 4 % (ref 0–6)
ERYTHROCYTE [DISTWIDTH] IN BLOOD BY AUTOMATED COUNT: 18.4 % (ref 11.6–15.1)
ERYTHROCYTE [DISTWIDTH] IN BLOOD BY AUTOMATED COUNT: 18.5 % (ref 11.6–15.1)
GFR SERPL CREATININE-BSD FRML MDRD: 128 ML/MIN/1.73SQ M
GFR SERPL CREATININE-BSD FRML MDRD: 129 ML/MIN/1.73SQ M
GLUCOSE P FAST SERPL-MCNC: 119 MG/DL (ref 65–99)
GLUCOSE SERPL-MCNC: 102 MG/DL (ref 65–140)
HCT VFR BLD AUTO: 35.7 % (ref 36.5–49.3)
HCT VFR BLD AUTO: 38.7 % (ref 36.5–49.3)
HGB BLD-MCNC: 12 G/DL (ref 12–17)
HGB BLD-MCNC: 12.6 G/DL (ref 12–17)
IMM GRANULOCYTES # BLD AUTO: 0.01 THOUSAND/UL (ref 0–0.2)
IMM GRANULOCYTES NFR BLD AUTO: 0 % (ref 0–2)
INR PPP: 1.75 (ref 0.85–1.19)
LYMPHOCYTES # BLD AUTO: 0.77 THOUSANDS/ÂΜL (ref 0.6–4.47)
LYMPHOCYTES NFR BLD AUTO: 18 % (ref 14–44)
MCH RBC QN AUTO: 31 PG (ref 26.8–34.3)
MCH RBC QN AUTO: 31.3 PG (ref 26.8–34.3)
MCHC RBC AUTO-ENTMCNC: 32.6 G/DL (ref 31.4–37.4)
MCHC RBC AUTO-ENTMCNC: 33.6 G/DL (ref 31.4–37.4)
MCV RBC AUTO: 92 FL (ref 82–98)
MCV RBC AUTO: 96 FL (ref 82–98)
MONOCYTES # BLD AUTO: 0.55 THOUSAND/ÂΜL (ref 0.17–1.22)
MONOCYTES NFR BLD AUTO: 13 % (ref 4–12)
NEUTROPHILS # BLD AUTO: 2.67 THOUSANDS/ÂΜL (ref 1.85–7.62)
NEUTS SEG NFR BLD AUTO: 64 % (ref 43–75)
NRBC BLD AUTO-RTO: 0 /100 WBCS
PLATELET # BLD AUTO: 60 THOUSANDS/UL (ref 149–390)
PLATELET # BLD AUTO: 61 THOUSANDS/UL (ref 149–390)
PMV BLD AUTO: 9.7 FL (ref 8.9–12.7)
PMV BLD AUTO: 9.9 FL (ref 8.9–12.7)
POTASSIUM SERPL-SCNC: 3.5 MMOL/L (ref 3.5–5.3)
POTASSIUM SERPL-SCNC: 3.7 MMOL/L (ref 3.5–5.3)
PROT SERPL-MCNC: 6.6 G/DL (ref 6.4–8.4)
PROTHROMBIN TIME: 20.6 SECONDS (ref 12.3–15)
RBC # BLD AUTO: 3.87 MILLION/UL (ref 3.88–5.62)
RBC # BLD AUTO: 4.02 MILLION/UL (ref 3.88–5.62)
SODIUM SERPL-SCNC: 134 MMOL/L (ref 135–147)
SODIUM SERPL-SCNC: 139 MMOL/L (ref 135–147)
WBC # BLD AUTO: 4.18 THOUSAND/UL (ref 4.31–10.16)
WBC # BLD AUTO: 4.58 THOUSAND/UL (ref 4.31–10.16)

## 2025-03-14 PROCEDURE — 80053 COMPREHEN METABOLIC PANEL: CPT

## 2025-03-14 PROCEDURE — 36415 COLL VENOUS BLD VENIPUNCTURE: CPT

## 2025-03-14 PROCEDURE — 99285 EMERGENCY DEPT VISIT HI MDM: CPT | Performed by: EMERGENCY MEDICINE

## 2025-03-14 PROCEDURE — 80048 BASIC METABOLIC PNL TOTAL CA: CPT | Performed by: EMERGENCY MEDICINE

## 2025-03-14 PROCEDURE — 85025 COMPLETE CBC W/AUTO DIFF WBC: CPT | Performed by: EMERGENCY MEDICINE

## 2025-03-14 PROCEDURE — 71045 X-RAY EXAM CHEST 1 VIEW: CPT

## 2025-03-14 PROCEDURE — 99396 PREV VISIT EST AGE 40-64: CPT | Performed by: FAMILY MEDICINE

## 2025-03-14 PROCEDURE — 72110 X-RAY EXAM L-2 SPINE 4/>VWS: CPT

## 2025-03-14 PROCEDURE — 99214 OFFICE O/P EST MOD 30 MIN: CPT | Performed by: FAMILY MEDICINE

## 2025-03-14 PROCEDURE — 93005 ELECTROCARDIOGRAM TRACING: CPT

## 2025-03-14 PROCEDURE — 84484 ASSAY OF TROPONIN QUANT: CPT | Performed by: EMERGENCY MEDICINE

## 2025-03-14 PROCEDURE — 85610 PROTHROMBIN TIME: CPT

## 2025-03-14 PROCEDURE — 85027 COMPLETE CBC AUTOMATED: CPT

## 2025-03-14 PROCEDURE — 99285 EMERGENCY DEPT VISIT HI MDM: CPT

## 2025-03-14 RX ORDER — CLONAZEPAM 0.5 MG/1
0.5 TABLET ORAL ONCE
Status: COMPLETED | OUTPATIENT
Start: 2025-03-14 | End: 2025-03-14

## 2025-03-14 RX ADMIN — CLONAZEPAM 0.5 MG: 0.5 TABLET ORAL at 16:07
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
## 2025-03-14 NOTE — ASSESSMENT & PLAN NOTE
Orders:  •  Iron Panel (Includes Ferritin, Iron Sat%, Iron, and TIBC); Future  •  Vitamin D 25 hydroxy; Future  •  Vitamin B12; Future  •  Vitamin B1, whole blood; Future  •  Folate; Future  •  TSH, 3rd generation with Free T4 reflex; Future

## 2025-03-14 NOTE — ASSESSMENT & PLAN NOTE
Chronic, managed by GI. Will update vitamin, iron, and thyroid levels as these have not be checked in some time.  Orders:  •  Iron Panel (Includes Ferritin, Iron Sat%, Iron, and TIBC); Future  •  Vitamin D 25 hydroxy; Future  •  Vitamin B12; Future  •  Vitamin B1, whole blood; Future  •  Folate; Future  •  TSH, 3rd generation with Free T4 reflex; Future

## 2025-03-14 NOTE — TELEPHONE ENCOUNTER
"FOLLOW UP: New patient Appointment made for 3/27/25.   Called 911 for patient, stayed on the phone until EMS arrived.   Patient being transported to ED by EMS.    REASON FOR CONVERSATION: Palpitations    SYMPTOMS: chest pain, sob, heart palpitations, severe back pain    OTHER: bp 152/100, hr 101; back pain 8/10     DISPOSITION: Call  Now      Reason for Disposition   Sounds like a life-threatening emergency to the triager    Answer Assessment - Initial Assessment Questions  1. DESCRIPTION: \"Please describe your heart rate or heartbeat that you are having\" (e.g., fast/slow, regular/irregular, skipped or extra beats, \"palpitations\")      Fast heart rate, palpitations  2. ONSET: \"When did it start?\" (e.g., minutes, hours, days)       A few years ago  3. DURATION: \"How long does it last\" (e.g., seconds, minutes, hours)      hours  4. PATTERN \"Does it come and go, or has it been constant since it started?\"  \"Does it get worse with exertion?\"   \"Are you feeling it now?\"      Comes and goes- worse with exertion    6. HEART RATE: \"Can you tell me your heart rate?\" \"How many beats in 15 seconds?\"  Note: Not all patients can do this.        115-140s  7. RECURRENT SYMPTOM: \"Have you ever had this before?\" If Yes, ask: \"When was the last time?\" and \"What happened that time?\"       Yes- patient states he has   8. CAUSE: \"What do you think is causing the palpitations?\"      unsure  9. CARDIAC HISTORY: \"Do you have any history of heart disease?\" (e.g., heart attack, angina, bypass surgery, angioplasty, arrhythmia)       Patient states he has history of afib  10. OTHER SYMPTOMS: \"Do you have any other symptoms?\" (e.g., dizziness, chest pain, sweating, difficulty breathing)        Dizzines, chest pain, sob, sweating    Protocols used: Heart Rate and Heartbeat Questions-Adult-[place]    "

## 2025-03-14 NOTE — DISCHARGE INSTRUCTIONS
Patient Education     Chest Pain, Adult ED   General Information   You came to the Emergency Department (ED) for chest pain. The doctor feels that the risk of a serious cause for your chest pain is low. Many things can cause chest pain. Some are serious things like heart disease or lung disease. Less serious things like stomach problems can also cause chest pain.  The doctors may not be able to find all serious causes of chest pain the first time they see you. It is important that you follow up with your doctor. You may be waiting on some test results. The staff will notify you if there are concerning results.  What care is needed at home?   Call your regular doctor to let them know you were in the ED. Make a follow-up appointment if you were told to.  Follow your regular doctor’s orders to keep your blood pressure, cholesterol, and high blood sugar (diabetes) under control.  If you smoke, try to quit. Your doctor or nurse can help.  Keep a healthy weight. If you are too heavy, lose weight.  Eat a healthy diet, as discussed with your doctor or nurse.  When do I need to get emergency help?   Call for an ambulance if:   You have signs of a heart attack, which may include:  Severe chest pain, pressure, or discomfort with:  Breathing trouble; sweating; upset stomach; or cold, clammy skin.  Pain in your arms, back, or jaw.  Worse pain with activity like walking up stairs.  Fast or irregular heartbeat.  Feeling dizzy, faint, or weak.  When do I need to call the doctor?   You have a fever of 100.4°F (38°C) or higher or chills.  You cough up yellow or green mucus.  You are more tired than normal or more trouble breathing with activity.  You have new or worsening symptoms.  Last Reviewed Date   2020-06-28  Consumer Information Use and Disclaimer   This generalized information is a limited summary of diagnosis, treatment, and/or medication information. It is not meant to be comprehensive and should be used as a tool to help  the user understand and/or assess potential diagnostic and treatment options. It does NOT include all information about conditions, treatments, medications, side effects, or risks that may apply to a specific patient. It is not intended to be medical advice or a substitute for the medical advice, diagnosis, or treatment of a health care provider based on the health care provider's examination and assessment of a patient’s specific and unique circumstances. Patients must speak with a health care provider for complete information about their health, medical questions, and treatment options, including any risks or benefits regarding use of medications. This information does not endorse any treatments or medications as safe, effective, or approved for treating a specific patient. UpTo[employer], [employer] and its affiliates disclaim any warranty or liability relating to this information or the use thereof. The use of this information is governed by the Terms of Use, available at https://www.[employer].com/en/know/clinical-effectiveness-terms   Copyright   Copyright © 2024 [employer]ToDate, Inc. and its affiliates and/or licensors. All rights reserved.

## 2025-03-17 ENCOUNTER — APPOINTMENT (OUTPATIENT)
Dept: LAB | Facility: CLINIC | Age: 50
End: 2025-03-17
Payer: COMMERCIAL

## 2025-03-17 ENCOUNTER — TELEPHONE (OUTPATIENT)
Age: 50
End: 2025-03-17

## 2025-03-17 DIAGNOSIS — F10.20 ALCOHOL USE DISORDER, SEVERE, DEPENDENCE (HCC): ICD-10-CM

## 2025-03-17 DIAGNOSIS — Z13.220 SCREENING, LIPID: ICD-10-CM

## 2025-03-17 DIAGNOSIS — K70.30 ALCOHOLIC CIRRHOSIS OF LIVER WITHOUT ASCITES (HCC): ICD-10-CM

## 2025-03-17 PROCEDURE — 83540 ASSAY OF IRON: CPT

## 2025-03-17 PROCEDURE — 80061 LIPID PANEL: CPT

## 2025-03-17 PROCEDURE — 82607 VITAMIN B-12: CPT

## 2025-03-17 PROCEDURE — 82728 ASSAY OF FERRITIN: CPT

## 2025-03-17 PROCEDURE — 85610 PROTHROMBIN TIME: CPT

## 2025-03-17 PROCEDURE — 36415 COLL VENOUS BLD VENIPUNCTURE: CPT

## 2025-03-17 PROCEDURE — 84425 ASSAY OF VITAMIN B-1: CPT

## 2025-03-17 PROCEDURE — 84443 ASSAY THYROID STIM HORMONE: CPT

## 2025-03-17 PROCEDURE — 85027 COMPLETE CBC AUTOMATED: CPT

## 2025-03-17 PROCEDURE — 82306 VITAMIN D 25 HYDROXY: CPT

## 2025-03-17 PROCEDURE — 82746 ASSAY OF FOLIC ACID SERUM: CPT

## 2025-03-17 PROCEDURE — 83550 IRON BINDING TEST: CPT

## 2025-03-17 PROCEDURE — 80053 COMPREHEN METABOLIC PANEL: CPT

## 2025-03-17 NOTE — TELEPHONE ENCOUNTER
Pt called in to check on lab status and when provider would be calling pt in regards to his lab results. Triage nurse informed pt that the lab are in process and once the provider reviews them she will call the pt. Pt verbalized understanding.

## 2025-03-18 LAB
25(OH)D3 SERPL-MCNC: 57.5 NG/ML (ref 30–100)
ALBUMIN SERPL BCG-MCNC: 3 G/DL (ref 3.5–5)
ALP SERPL-CCNC: 126 U/L (ref 34–104)
ALT SERPL W P-5'-P-CCNC: 30 U/L (ref 7–52)
ANION GAP SERPL CALCULATED.3IONS-SCNC: 6 MMOL/L (ref 4–13)
AST SERPL W P-5'-P-CCNC: 73 U/L (ref 13–39)
ATRIAL RATE: 99 BPM
BILIRUB SERPL-MCNC: 3.28 MG/DL (ref 0.2–1)
BUN SERPL-MCNC: 6 MG/DL (ref 5–25)
CALCIUM ALBUM COR SERPL-MCNC: 9 MG/DL (ref 8.3–10.1)
CALCIUM SERPL-MCNC: 8.2 MG/DL (ref 8.4–10.2)
CHLORIDE SERPL-SCNC: 107 MMOL/L (ref 96–108)
CHOLEST SERPL-MCNC: 161 MG/DL (ref ?–200)
CO2 SERPL-SCNC: 23 MMOL/L (ref 21–32)
CREAT SERPL-MCNC: 0.48 MG/DL (ref 0.6–1.3)
ERYTHROCYTE [DISTWIDTH] IN BLOOD BY AUTOMATED COUNT: 18.5 % (ref 11.6–15.1)
FERRITIN SERPL-MCNC: 39 NG/ML (ref 24–336)
FOLATE SERPL-MCNC: 20.3 NG/ML
GFR SERPL CREATININE-BSD FRML MDRD: 128 ML/MIN/1.73SQ M
GLUCOSE P FAST SERPL-MCNC: 135 MG/DL (ref 65–99)
HCT VFR BLD AUTO: 38 % (ref 36.5–49.3)
HDLC SERPL-MCNC: 45 MG/DL
HGB BLD-MCNC: 12.8 G/DL (ref 12–17)
INR PPP: 2.01 (ref 0.85–1.19)
IRON SATN MFR SERPL: 13 % (ref 15–50)
IRON SERPL-MCNC: 39 UG/DL (ref 50–212)
LDLC SERPL CALC-MCNC: 93 MG/DL (ref 0–100)
MCH RBC QN AUTO: 32.1 PG (ref 26.8–34.3)
MCHC RBC AUTO-ENTMCNC: 33.7 G/DL (ref 31.4–37.4)
MCV RBC AUTO: 95 FL (ref 82–98)
NONHDLC SERPL-MCNC: 116 MG/DL
P AXIS: 47 DEGREES
PLATELET # BLD AUTO: 68 THOUSANDS/UL (ref 149–390)
PMV BLD AUTO: 10.2 FL (ref 8.9–12.7)
POTASSIUM SERPL-SCNC: 3.6 MMOL/L (ref 3.5–5.3)
PR INTERVAL: 152 MS
PROT SERPL-MCNC: 6.5 G/DL (ref 6.4–8.4)
PROTHROMBIN TIME: 22.8 SECONDS (ref 12.3–15)
QRS AXIS: 32 DEGREES
QRSD INTERVAL: 98 MS
QT INTERVAL: 396 MS
QTC INTERVAL: 508 MS
RBC # BLD AUTO: 3.99 MILLION/UL (ref 3.88–5.62)
SODIUM SERPL-SCNC: 136 MMOL/L (ref 135–147)
T WAVE AXIS: 28 DEGREES
TIBC SERPL-MCNC: 294 UG/DL (ref 250–450)
TRANSFERRIN SERPL-MCNC: 210 MG/DL (ref 203–362)
TRIGL SERPL-MCNC: 115 MG/DL (ref ?–150)
TSH SERPL DL<=0.05 MIU/L-ACNC: 1.39 UIU/ML (ref 0.45–4.5)
UIBC SERPL-MCNC: 255 UG/DL (ref 155–355)
VENTRICULAR RATE: 99 BPM
VIT B12 SERPL-MCNC: 4329 PG/ML (ref 180–914)
WBC # BLD AUTO: 3.5 THOUSAND/UL (ref 4.31–10.16)

## 2025-03-18 PROCEDURE — 93010 ELECTROCARDIOGRAM REPORT: CPT | Performed by: INTERNAL MEDICINE

## 2025-03-21 LAB — VIT B1 BLD-SCNC: 103.6 NMOL/L (ref 66.5–200)

## 2025-03-22 DIAGNOSIS — R18.8 ASCITES: ICD-10-CM

## 2025-03-24 RX ORDER — SPIRONOLACTONE 50 MG/1
50 TABLET, FILM COATED ORAL DAILY
Qty: 30 TABLET | Refills: 5 | Status: SHIPPED | OUTPATIENT
Start: 2025-03-24

## 2025-03-27 ENCOUNTER — OFFICE VISIT (OUTPATIENT)
Dept: CARDIOLOGY CLINIC | Facility: CLINIC | Age: 50
End: 2025-03-27
Payer: COMMERCIAL

## 2025-03-27 VITALS
RESPIRATION RATE: 16 BRPM | HEIGHT: 69 IN | SYSTOLIC BLOOD PRESSURE: 128 MMHG | HEART RATE: 93 BPM | WEIGHT: 207 LBS | OXYGEN SATURATION: 98 % | DIASTOLIC BLOOD PRESSURE: 70 MMHG | BODY MASS INDEX: 30.66 KG/M2

## 2025-03-27 DIAGNOSIS — R00.2 PALPITATIONS: ICD-10-CM

## 2025-03-27 PROCEDURE — 99203 OFFICE O/P NEW LOW 30 MIN: CPT | Performed by: INTERNAL MEDICINE
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
## 2025-03-28 ENCOUNTER — TELEPHONE (OUTPATIENT)
Age: 50
End: 2025-03-28

## 2025-03-28 NOTE — TELEPHONE ENCOUNTER
Recall for banding    Scheduled date of EGD(as of today): 04/28/2025  Physician performing EGD: Dr. Schneider  Location of EGD: [facility]  Instructions reviewed with patient by: ELIZA  Instructions sent via email  Clearances: pt is diabetic

## 2025-03-31 ENCOUNTER — EVALUATION (OUTPATIENT)
Dept: PHYSICAL THERAPY | Age: 50
End: 2025-03-31
Payer: COMMERCIAL

## 2025-03-31 DIAGNOSIS — G89.29 CHRONIC BILATERAL LOW BACK PAIN WITHOUT SCIATICA: ICD-10-CM

## 2025-03-31 DIAGNOSIS — M54.50 CHRONIC BILATERAL LOW BACK PAIN WITHOUT SCIATICA: ICD-10-CM

## 2025-03-31 PROCEDURE — 97110 THERAPEUTIC EXERCISES: CPT | Performed by: PHYSICAL THERAPIST

## 2025-03-31 PROCEDURE — 97162 PT EVAL MOD COMPLEX 30 MIN: CPT | Performed by: PHYSICAL THERAPIST

## 2025-03-31 NOTE — PROGRESS NOTES
PT Evaluation     Today's date: 3/31/2025  Patient name: Robert Combs  : 1975  MRN: [id number]  Referring provider: Yolanda Billy DO  Dx:   Encounter Diagnosis     ICD-10-CM    1. Chronic bilateral low back pain without sciatica  M54.50 Ambulatory Referral to Physical Therapy    G89.29           Start Time: 1010  Stop Time: 1050  Total time in clinic (min): 40 minutes    Assessment  Impairments: abnormal or restricted ROM, activity intolerance, impaired physical strength, lacks appropriate home exercise program, pain with function, poor posture  and poor body mechanics  Symptom irritability: moderate    Assessment details: Robert Combs is a 49 y.o. male who presents with pain, decreased strength, and decreased ROM. Due to these impairments, Patient has difficulty performing a/iadls and recreational activities. Patient's clinical presentation is consistent with their referring diagnosis of chronic LBP. Patient would benefit from skilled physical therapy to address their aforementioned impairments, improve their level of function and to improve their overall quality of life.  Understanding of Dx/Px/POC: good     Prognosis: good    Goals  ST-3 WEEKS  1.  Decrease pain by 2 points on VAS at its worst.  2.  Increase ROM by > 5 deg in all deficients planes.  3.  Increase core strength by 1/2 MMT grade in all deficient planes.    LT-6 WEEKS  1. Patient to be independent with a/iadls.  2. Increase functional activities for leisure and home activities to previous LOF.  3. Independent with HEP and/or fitness program.    Plan  Patient would benefit from: skilled physical therapy  Planned modality interventions: cryotherapy, thermotherapy: hydrocollator packs, unattended electrical stimulation and low level laser therapy    Planned therapy interventions: activity modification, behavior modification, body mechanics training, aquatic therapy, flexibility, functional ROM exercises, home exercise program, IADL  "retraining, joint mobilization, manual therapy, neuromuscular re-education, patient education, postural training, strengthening, stretching, therapeutic activities, therapeutic exercise and abdominal trunk stabilization    Frequency: 2-3x week.  Duration in weeks: 12  Plan of Care beginning date: 3/31/2025  Plan of Care expiration date: 2025  Treatment plan discussed with: patient        Subjective Evaluation    History of Present Illness  Mechanism of injury: Patient has long history of LBP. He c/o difficulty lifting heavy, such as bags of corn of deer and cat litter. He was cutting a tree down this weekend. Patient c/o cramping at night due to \"dehydration\" even though he drank 4 glasses of water during the night. Hx of DM but declines treatment.           Recurrent probem    Patient Goals  Patient goals for therapy: decreased pain, increased strength and return to sport/leisure activities  Patient goal: \"doctor sent me here\"  Pain  Current pain ratin  At worst pain ratin  Location: across LB  Quality: sharp, discomfort and tight  Aggravating factors: lifting  Progression: no change    Social Support  Steps to enter house: yes  Stairs in house: no   Lives in: one-story house  Lives with: significant other    Employment status: not working    Diagnostic Tests  X-ray: normal    FCE comments: Recent Xrays showed some degenerative changes at L5-S1 otherwise unremarkable.       Objective     Postural Observations  Seated posture: good  Standing posture: good      Palpation   Left   Hypertonic in the erector spinae and lumbar paraspinals.   Tenderness of the erector spinae and lumbar paraspinals.     Right   Hypertonic in the erector spinae and lumbar paraspinals.   Tenderness of the erector spinae and lumbar paraspinals.     Additional Palpation Details  General pain in flank area and across LB but palpation does not elicit more pain.     Neurological Testing     Sensation     Lumbar   Left   Intact: light " touch    Right   Intact: light touch    Reflexes   Left   Patellar (L4): normal (2+)    Right   Patellar (L4): normal (2+)    Active Range of Motion     Lumbar   Flexion: Active lumbar flexion: tips to mid shins.  Restriction level: moderate  Extension:  WFL  Left lateral flexion:  WFL  Right lateral flexion:  WFL    Strength/Myotome Testing     Left Hip   Planes of Motion   Flexion: 4+  Extension: 4+  Abduction: 5  Adduction: 5    Right Hip   Planes of Motion   Flexion: 4+  Extension: 4+  Abduction: 5  Adduction: 5    Left Knee   Flexion: 4+  Extension: 5    Right Knee   Flexion: 5  Extension: 5    Left Ankle/Foot   Dorsiflexion: 5  Plantar flexion: 5    Right Ankle/Foot   Dorsiflexion: 5  Plantar flexion: 5    Additional Strength Details  Core/abdominal: 4-/5    Tests     Lumbar     Left   Negative passive SLR and slump test.     Right   Negative passive SLR and slump test.     Left Hip   Negative JUSTICE and FADIR.     Right Hip   Negative JUSTICE and FADIR.     Ambulation   Weight-Bearing Status   Assistive device used: none    Functional Assessment        Single Leg Stance   Left: 4 seconds  Right: 3 seconds    General Comments:    Lower quarter screen   Knees: unremarkable  Foot/ankle: unremarkable    Hip Comments   Very tight hamstrings: 65° bilateral             POC expires Unit limit Auth Expiration date PT/OT + Visit Limit?   6/30/25 BOMN 6/30 20                           Visit/Unit Tracking  AUTH Status:  Date 3/31              Auth after visit 20 Used 1               Remaining  19               FOTO                     Precautions: DM without meds, Disabled,     Daily Treatment Diary:  Manuals 3/31            LB/LE's, hamst SG                                                   Neuro Re-Ed                                       Ther Ex             Hip adduction Ball 30x            Hip abduction 30x             bridge 10x                                      B heel slides 10x            Ball press standing 10x  "3\"            TB rows, shld extension 30x ea BTB            Slant board nv            Nustep nv                                                   Ther Activity                                       Gait Training                                       Modalities                                            " Bcc Infiltrative Histology Text: There were numerous aggregates of basaloid cells demonstrating an infiltrative pattern.

## 2025-04-03 ENCOUNTER — OFFICE VISIT (OUTPATIENT)
Dept: PHYSICAL THERAPY | Age: 50
End: 2025-04-03
Payer: COMMERCIAL

## 2025-04-03 DIAGNOSIS — M54.50 CHRONIC BILATERAL LOW BACK PAIN WITHOUT SCIATICA: Primary | ICD-10-CM

## 2025-04-03 DIAGNOSIS — G89.29 CHRONIC BILATERAL LOW BACK PAIN WITHOUT SCIATICA: Primary | ICD-10-CM

## 2025-04-03 PROCEDURE — 97110 THERAPEUTIC EXERCISES: CPT

## 2025-04-03 NOTE — PROGRESS NOTES
Daily Note     Today's date: 4/3/2025  Patient name: Robert Combs  : 1975  MRN: [id number]  Referring provider: Yolanda Billy DO  Dx:   Encounter Diagnosis     ICD-10-CM    1. Chronic bilateral low back pain without sciatica  M54.50     G89.29           Start Time: 0700  Stop Time: 0745  Total time in clinic (min): 45 minutes    Subjective: Pt had increased pain yesterday, 8/10 on the pain scale, he describes that it was not from the exercises performed during the initial evaluation. He managed this pain by lying flat on the ground. He states that some of the interventions are low level for him as he has an extensive history with weightlifting.       Objective: See treatment diary below      Assessment: Progressed several interventions machines at a light weight to address the patients subjective. Included standing extensions to encourage lumbar extension. Throughout session provided verbal cues for abdominal bracing. Lastly, educated the patient on the benefits of low level interventions for the purpose of pain relief, instead of building strength, and he demonstrated fair understanding. Tolerated treatment fair. Patient demonstrated fatigue post treatment      Plan: Continue per plan of care.        POC expires Unit limit Auth Expiration date PT/OT + Visit Limit?   25 BOMN  20                           Visit/Unit Tracking  AUTH Status:  Date 3/31 4/3             Auth after visit 20 Used 1 2              Remaining  19 18              FOTO                     Precautions: DM without meds, Disabled,     Daily Treatment Diary:  Manuals 3/31 4/3           LB/LE's, hamst SG                                                   Neuro Re-Ed                                       Ther Ex             Hip adduction Ball 30x HEP           Hip abduction 30x  HEP           bridge 10x 2x10            LTRs  X15 ea            Standing extensions  X20 ea                         B heel slides 10x            Ball press  "standing 10x 3\" 10 x10\"            Janny rows, shld extension 30x ea BTB 2x10 20# ext   \"\" 30# Rows            Slant board nv 4x30\"            Nustep nv 5' L7                                      Cybex hip add  2x10 40#           Cybex hip abd  2x10 30#            Ther Activity                                       Gait Training                                       Modalities                                            "

## 2025-04-09 ENCOUNTER — OFFICE VISIT (OUTPATIENT)
Dept: PHYSICAL THERAPY | Age: 50
End: 2025-04-09
Payer: COMMERCIAL

## 2025-04-09 DIAGNOSIS — G89.29 CHRONIC BILATERAL LOW BACK PAIN WITHOUT SCIATICA: Primary | ICD-10-CM

## 2025-04-09 DIAGNOSIS — M54.50 CHRONIC BILATERAL LOW BACK PAIN WITHOUT SCIATICA: Primary | ICD-10-CM

## 2025-04-09 PROCEDURE — 97112 NEUROMUSCULAR REEDUCATION: CPT

## 2025-04-09 PROCEDURE — 97110 THERAPEUTIC EXERCISES: CPT

## 2025-04-09 NOTE — PROGRESS NOTES
"Daily Note     Today's date: 2025  Patient name: Robert Combs  : 1975  MRN: [id number]  Referring provider: Yolanda Billy DO  Dx:   Encounter Diagnosis     ICD-10-CM    1. Chronic bilateral low back pain without sciatica  M54.50     G89.29                      Subjective: Notes pain centrally in low back. Cramping in inner thigh last night.       Objective: See treatment diary below      Assessment: Cuing utilized for TA engagement and TA maintenance t/o exercise. Consider addition of multifidus activation nv for improved motor control with functional activities. No now concerns or sx irritability reported post session.     Plan: Continue per plan of care.        POC expires Unit limit Auth Expiration date PT/OT + Visit Limit?   25 BOMN  20                           Visit/Unit Tracking  AUTH Status:  Date 3/31 4/3 4/9            Auth after visit 20 Used 1 2 3             Remaining  19 18 17             FOTO                     Precautions: DM without meds, Disabled,     Daily Treatment Diary:  Manuals 3/31 4/3 4/9          LB/LE's, hamst SG                                                   Neuro Re-Ed                                       Ther Ex             Hip adduction Ball 30x HEP           Hip abduction 30x  HEP           bridge 10x 2x10  2x10          LTRs  X15 ea  X20 ea          Standing extensions  X20 ea  X20 ea                       B heel slides 10x  2x10          Ball press standing 10x 3\" 10 x10\"  10x10\"          [place] rows, shld extension 30x ea BTB 2x10 20# ext   \"\" 30# Rows  2x10 20# ext 30# rows          Slant board nv 4x30\"  4x30\"          Nustep nv 5' L7  7' L5                                    Cybex hip add  2x10 40# 2x10 40#           Cybex hip abd  2x10 30#  2x10 30#          Multifidus press   Consider nv          Ther Activity                                       Gait Training                                       Modalities                                     "

## 2025-04-11 ENCOUNTER — APPOINTMENT (OUTPATIENT)
Dept: PHYSICAL THERAPY | Age: 50
End: 2025-04-11
Payer: COMMERCIAL

## 2025-04-14 ENCOUNTER — APPOINTMENT (OUTPATIENT)
Dept: PHYSICAL THERAPY | Age: 50
End: 2025-04-14
Payer: COMMERCIAL

## 2025-04-14 DIAGNOSIS — G89.29 CHRONIC BILATERAL LOW BACK PAIN WITHOUT SCIATICA: Primary | ICD-10-CM

## 2025-04-14 DIAGNOSIS — M54.50 CHRONIC BILATERAL LOW BACK PAIN WITHOUT SCIATICA: Primary | ICD-10-CM

## 2025-04-14 NOTE — PROGRESS NOTES
"Daily Note     Today's date: 2025  Patient name: Robert Combs  : 1975  MRN: [id number]  Referring provider: Yolanda Billy DO  Dx:   Encounter Diagnosis     ICD-10-CM    1. Chronic bilateral low back pain without sciatica  M54.50     G89.29                      Subjective: ***      Objective: See treatment diary below      Assessment: Tolerated treatment {Tolerated treatment :}. Patient {assessment:}      Plan: {PLAN:[id number]}       POC expires Unit limit Auth Expiration date PT/OT + Visit Limit?   25 BOMN  20                           Visit/Unit Tracking  AUTH Status:  Date 3/31 4/3 4/9            Auth after visit 20 Used 1 2 3             Remaining  19 18 17             FOTO                 Precautions: DM without meds, Disabled,     Daily Treatment Diary:  Manuals 3/31 4/3 4/9          LB/LE's, hamst SG                                                   Neuro Re-Ed                                       Ther Ex             Hip adduction Ball 30x HEP           Hip abduction 30x  HEP           bridge 10x 2x10  2x10          LTRs  X15 ea  X20 ea          Standing extensions  X20 ea  X20 ea                       B heel slides 10x  2x10          Ball press standing 10x 3\" 10 x10\"  10x10\"          Janny rows, shld extension 30x ea BTB 2x10 20# ext   \"\" 30# Rows  2x10 20# ext 30# rows          Slant board nv 4x30\"  4x30\"          Nustep nv 5' L7  7' L5                                    Cybex hip add  2x10 40# 2x10 40#           Cybex hip abd  2x10 30#  2x10 30#          Multifidus press   Consider nv          Ther Activity                                       Gait Training                                       Modalities                                                "

## 2025-04-15 ENCOUNTER — APPOINTMENT (OUTPATIENT)
Dept: PHYSICAL THERAPY | Age: 50
End: 2025-04-15
Payer: COMMERCIAL

## 2025-04-17 ENCOUNTER — HOSPITAL ENCOUNTER (OUTPATIENT)
Dept: NON INVASIVE DIAGNOSTICS | Facility: HOSPITAL | Age: 50
Discharge: HOME | End: 2025-04-17
Attending: INTERNAL MEDICINE
Payer: COMMERCIAL

## 2025-04-17 DIAGNOSIS — R00.2 PALPITATIONS: ICD-10-CM

## 2025-04-17 PROCEDURE — 93226 XTRNL ECG REC<48 HR SCAN A/R: CPT

## 2025-04-17 PROCEDURE — 93225 XTRNL ECG REC<48 HRS REC: CPT

## 2025-04-18 ENCOUNTER — APPOINTMENT (OUTPATIENT)
Dept: PHYSICAL THERAPY | Age: 50
End: 2025-04-18
Payer: COMMERCIAL

## 2025-04-22 ENCOUNTER — RESULTS FOLLOW-UP (OUTPATIENT)
Dept: CARDIOLOGY CLINIC | Facility: CLINIC | Age: 50
End: 2025-04-22

## 2025-04-22 ENCOUNTER — CONSULT (OUTPATIENT)
Age: 50
End: 2025-04-22
Payer: COMMERCIAL

## 2025-04-22 VITALS — BODY MASS INDEX: 29.89 KG/M2 | HEIGHT: 69 IN | WEIGHT: 201.8 LBS

## 2025-04-22 DIAGNOSIS — M47.812 CERVICAL SPONDYLOSIS: ICD-10-CM

## 2025-04-22 DIAGNOSIS — M79.18 MYOFASCIAL PAIN SYNDROME: ICD-10-CM

## 2025-04-22 DIAGNOSIS — G62.9 POLYNEUROPATHY: ICD-10-CM

## 2025-04-22 DIAGNOSIS — R29.898 LEFT LEG WEAKNESS: Primary | ICD-10-CM

## 2025-04-22 DIAGNOSIS — M54.16 LUMBAR RADICULOPATHY: ICD-10-CM

## 2025-04-22 DIAGNOSIS — M54.2 NECK PAIN: ICD-10-CM

## 2025-04-22 DIAGNOSIS — G89.4 CHRONIC PAIN SYNDROME: ICD-10-CM

## 2025-04-22 DIAGNOSIS — G89.29 CHRONIC BILATERAL LOW BACK PAIN WITHOUT SCIATICA: ICD-10-CM

## 2025-04-22 DIAGNOSIS — M54.50 CHRONIC BILATERAL LOW BACK PAIN WITHOUT SCIATICA: ICD-10-CM

## 2025-04-22 PROCEDURE — 93227 XTRNL ECG REC<48 HR R&I: CPT | Performed by: STUDENT IN AN ORGANIZED HEALTH CARE EDUCATION/TRAINING PROGRAM

## 2025-04-22 PROCEDURE — 99204 OFFICE O/P NEW MOD 45 MIN: CPT | Performed by: ANESTHESIOLOGY

## 2025-04-22 RX ORDER — PREGABALIN 50 MG/1
50 CAPSULE ORAL 3 TIMES DAILY
Qty: 90 CAPSULE | Refills: 1 | Status: SHIPPED | OUTPATIENT
Start: 2025-04-22 | End: 2025-05-22
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
## 2025-04-22 NOTE — PATIENT INSTRUCTIONS
Patient Education     Core Strengthening Exercises on Back or on Hands and Knees   About this topic   Your core muscles are in your chest, back, buttock, and stomach area. They are your abdominal, back, and pelvis muscles. These muscles help keep your body stable when using your arms or legs. They also help with balance and posture. There are many exercises you can do to keep these muscles strong.  If you have back problems like a compression fracture or a ruptured disc, doing some of these exercises could make your problem worse. Some of these exercises may cause lower back pain.  General   Before starting with a program, ask your doctor if you are healthy enough to do these exercises. Your doctor may have you work with a , chiropractor, or physical therapist to make a safe exercise program to meet your needs.  Strengthening Exercises   Strengthening exercises keep your muscles firm and strong. Start by repeating each exercise 2 to 3 times. Work up to doing each exercise 10 times. Try to do the exercises 2 to 3 times each day. Hold each exercise for 3 to 5 seconds. Do all exercises slowly.  Hip lifts ? Lie on your back with your knees bent and feet flat on the floor. Tighten your stomach muscles and push your heels into the floor to lift your buttocks off the floor. Relax.  Pelvic tilts ? Lie on your back with your knees bent and feet flat on the floor. Tighten your stomach muscles and press your lower back down to the floor. Relax.  Straight leg raises lying down ? Lie on your back with one leg straight. Bend your other knee so the foot is flat on the bed. Keeping your leg straight, lift the leg up to the level of your other knee. Lower it back down. Repeat with the other leg.  Knee flex lying down ? Lie on your back with both knees bent and your feet flat on the floor. Tighten your belly muscles. Raise one leg up and back down as if you are marching in slow motion. Keep belly muscles tight while you move  your leg. Switch legs. To make this exercise harder, raise both arms straight up in the air. Tighten your belly muscles. When you raise one leg up, reach the opposite arm over your head. Switch, moving the opposite arm and leg until you have done 10 repetitions on each side.  Abdominal crunches ? Lie on your back with both knees bent. Keep your feet flat on the floor. Place your hands in one of these positions. Try starting with the first position since it is the easiest. As you get better, use the other positions to make it harder.  Crunches with arms at sides.  Crunches with arms across chest.  Crunches with arms behind head. Be careful not to interlock your fingers behind your neck or head while doing crunches. This may add tension to your neck and cause strain.  Look at the ceiling. Tighten your belly muscles and lift your shoulders and upper back off the floor. Breathe out while you are doing this. Lower your shoulders to the floor. Breathe in while you are doing this. Relax your belly muscles all the way before starting another crunch.  Arm and leg lifts on hands and knees ? Start on your hands and knees. With all of these exercises, keep your back as level as possible. If you are having trouble with this, you may want to put a small object on your back such as a book. If it falls off, you are not keeping your back level enough during the exercise.  Lift one arm up to shoulder level and hold. Lower it back down. Now, lift up the other arm and hold.  Lift one leg up and kick it straight out until it is in line with your back and hold. Lower it back down. Now, lift up the other leg and hold.  Lift one arm and the OPPOSITE leg up at the same time and hold. Lower them down. Now, repeat using the other arm and leg. This is a very hard exercise. It may take time to be able to do this.               What will the results be?   Stronger core  Better balance  More toned belly and back muscles  Easier to do daily  activities  Better sports performance  Less low back pain  Helpful tips   Stay active and work out to keep your muscles strong and flexible.  Keep a healthy weight to avoid putting too much stress on your spine. Eat a healthy diet to keep your muscles healthy.  Be sure you do not hold your breath when exercising. This can raise your blood pressure. If you tend to hold your breath, try counting out loud when exercising. If any exercise bothers you, stop right away.  Try walking or cycling at an easy pace for a few minutes to warm up your muscles. Do this again after exercising.  Exercise may be slightly uncomfortable, but you should not have sharp pains. If you do get sharp pains, stop what you are doing. If the sharp pains continue, call your doctor.  Last Reviewed Date   2021-03-18  Consumer Information Use and Disclaimer   This generalized information is a limited summary of diagnosis, treatment, and/or medication information. It is not meant to be comprehensive and should be used as a tool to help the user understand and/or assess potential diagnostic and treatment options. It does NOT include all information about conditions, treatments, medications, side effects, or risks that may apply to a specific patient. It is not intended to be medical advice or a substitute for the medical advice, diagnosis, or treatment of a health care provider based on the health care provider's examination and assessment of a patient’s specific and unique circumstances. Patients must speak with a health care provider for complete information about their health, medical questions, and treatment options, including any risks or benefits regarding use of medications. This information does not endorse any treatments or medications as safe, effective, or approved for treating a specific patient. [employer] and its affiliates disclaim any warranty or liability relating to this information or the use thereof. The use of this information  is governed by the Terms of Use, available at https://www.woltersincuBETuwer.com/en/know/clinical-effectiveness-terms   Copyright   Copyright © 2024 [employer] and its affiliates and/or licensors. All rights reserved.

## 2025-04-22 NOTE — TELEPHONE ENCOUNTER
----- Message from Nadia Murrieta MD sent at 4/22/2025  9:12 AM EDT -----  Please call.  Holter monitor did not show any significant arrhythmias.

## 2025-04-28 ENCOUNTER — HOSPITAL ENCOUNTER (OUTPATIENT)
Dept: GASTROENTEROLOGY | Facility: HOSPITAL | Age: 50
Setting detail: OUTPATIENT SURGERY
Discharge: HOME | End: 2025-04-28
Attending: INTERNAL MEDICINE | Admitting: INTERNAL MEDICINE
Payer: COMMERCIAL

## 2025-04-28 ENCOUNTER — ANESTHESIA EVENT (OUTPATIENT)
Dept: GASTROENTEROLOGY | Facility: HOSPITAL | Age: 50
End: 2025-04-28
Payer: COMMERCIAL

## 2025-04-28 ENCOUNTER — ANESTHESIA (OUTPATIENT)
Dept: GASTROENTEROLOGY | Facility: HOSPITAL | Age: 50
End: 2025-04-28
Payer: COMMERCIAL

## 2025-04-28 VITALS
HEART RATE: 92 BPM | SYSTOLIC BLOOD PRESSURE: 120 MMHG | WEIGHT: 207.89 LBS | BODY MASS INDEX: 30.79 KG/M2 | TEMPERATURE: 98.5 F | RESPIRATION RATE: 16 BRPM | HEIGHT: 69 IN | OXYGEN SATURATION: 94 % | DIASTOLIC BLOOD PRESSURE: 71 MMHG

## 2025-04-28 DIAGNOSIS — R10.11 RIGHT UPPER QUADRANT PAIN: ICD-10-CM

## 2025-04-28 DIAGNOSIS — R10.9 ABDOMINAL PAIN, UNSPECIFIED ABDOMINAL LOCATION: ICD-10-CM

## 2025-04-28 DIAGNOSIS — K21.9 GASTROESOPHAGEAL REFLUX DISEASE WITHOUT ESOPHAGITIS: ICD-10-CM

## 2025-04-28 LAB — GLUCOSE SERPL-MCNC: 137 MG/DL (ref 65–140)

## 2025-04-28 PROCEDURE — 82948 REAGENT STRIP/BLOOD GLUCOSE: CPT

## 2025-04-28 PROCEDURE — 43235 EGD DIAGNOSTIC BRUSH WASH: CPT | Performed by: INTERNAL MEDICINE

## 2025-04-28 RX ORDER — PROPOFOL 10 MG/ML
INJECTION, EMULSION INTRAVENOUS AS NEEDED
Status: DISCONTINUED | OUTPATIENT
Start: 2025-04-28 | End: 2025-04-28

## 2025-04-28 RX ORDER — LIDOCAINE HYDROCHLORIDE 20 MG/ML
INJECTION, SOLUTION EPIDURAL; INFILTRATION; INTRACAUDAL; PERINEURAL AS NEEDED
Status: DISCONTINUED | OUTPATIENT
Start: 2025-04-28 | End: 2025-04-28

## 2025-04-28 RX ORDER — SODIUM CHLORIDE, SODIUM LACTATE, POTASSIUM CHLORIDE, CALCIUM CHLORIDE 600; 310; 30; 20 MG/100ML; MG/100ML; MG/100ML; MG/100ML
75 INJECTION, SOLUTION INTRAVENOUS CONTINUOUS
Status: DISCONTINUED | OUTPATIENT
Start: 2025-04-28 | End: 2025-05-02 | Stop reason: HOSPADM

## 2025-04-28 RX ORDER — SODIUM CHLORIDE, SODIUM LACTATE, POTASSIUM CHLORIDE, CALCIUM CHLORIDE 600; 310; 30; 20 MG/100ML; MG/100ML; MG/100ML; MG/100ML
INJECTION, SOLUTION INTRAVENOUS CONTINUOUS PRN
Status: DISCONTINUED | OUTPATIENT
Start: 2025-04-28 | End: 2025-04-28

## 2025-04-28 RX ADMIN — PROPOFOL 30 MG: 10 INJECTION, EMULSION INTRAVENOUS at 09:31

## 2025-04-28 RX ADMIN — SODIUM CHLORIDE, SODIUM LACTATE, POTASSIUM CHLORIDE, AND CALCIUM CHLORIDE 75 ML/HR: .6; .31; .03; .02 INJECTION, SOLUTION INTRAVENOUS at 08:53

## 2025-04-28 RX ADMIN — PROPOFOL 30 MG: 10 INJECTION, EMULSION INTRAVENOUS at 09:32

## 2025-04-28 RX ADMIN — PROPOFOL 20 MG: 10 INJECTION, EMULSION INTRAVENOUS at 09:33

## 2025-04-28 RX ADMIN — PROPOFOL 120 MG: 10 INJECTION, EMULSION INTRAVENOUS at 09:30

## 2025-04-28 RX ADMIN — SODIUM CHLORIDE, SODIUM LACTATE, POTASSIUM CHLORIDE, AND CALCIUM CHLORIDE: .6; .31; .03; .02 INJECTION, SOLUTION INTRAVENOUS at 09:24

## 2025-04-28 RX ADMIN — LIDOCAINE HYDROCHLORIDE 100 MG: 20 INJECTION, SOLUTION EPIDURAL; INFILTRATION; INTRACAUDAL; PERINEURAL at 09:30
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
## 2025-04-28 NOTE — ANESTHESIA POSTPROCEDURE EVALUATION
Post-Op Assessment Note    CV Status:  Stable    Pain management: adequate       Mental Status:  Alert, awake and sleepy   Hydration Status:  Euvolemic   PONV Controlled:  Controlled   Airway Patency:  Patent     Post Op Vitals Reviewed: Yes    No anethesia notable event occurred.    Staff: CRNA           Last Filed PACU Vitals:  Vitals Value Taken Time   Temp 98.5 °F (36.9 °C) 04/28/25 0937   Pulse 101 04/28/25 0937   /72 04/28/25 0937   Resp 16 04/28/25 0937   SpO2 95 % 04/28/25 0937       Modified Magen:     Vitals Value Taken Time   Activity 1 04/28/25 0937   Respiration 2 04/28/25 0937   Circulation 2 04/28/25 0937   Consciousness 1 04/28/25 0937   Oxygen Saturation 2 04/28/25 0937     Modified Magen Score: 8

## 2025-04-28 NOTE — ANESTHESIA PREPROCEDURE EVALUATION
Procedure:  EGD    Relevant Problems   GI/HEPATIC   (+) Alcoholic cirrhosis of liver with ascites (HCC)   (+) Hepatitis C virus infection without hepatic coma      HEMATOLOGY   (+) Pancytopenia (HCC)      MUSCULOSKELETAL   (+) Chronic bilateral low back pain without sciatica   (+) Myofascial pain syndrome      NEURO/PSYCH   (+) Chronic bilateral low back pain without sciatica   (+) Chronic pain syndrome   (+) Depression with anxiety   (+) Left leg weakness   (+) Myofascial pain syndrome        Physical Exam    Airway    Mallampati score: I  TM Distance: >3 FB  Neck ROM: full     Dental       Cardiovascular  Cardiovascular exam normal    Pulmonary  Pulmonary exam normal     Other Findings        Anesthesia Plan  ASA Score- 3     Anesthesia Type- IV sedation with anesthesia with ASA Monitors.         Additional Monitors:     Airway Plan:            Plan Factors-Exercise tolerance (METS): >4 METS.    Chart reviewed. EKG reviewed. Imaging results reviewed. Existing labs reviewed. Patient summary reviewed.                  Induction- intravenous.    Postoperative Plan-         Informed Consent- Anesthetic plan and risks discussed with patient.  I personally reviewed this patient with the CRNA. Discussed and agreed on the Anesthesia Plan with the CRNA..      NPO Status:  No vitals data found for the desired time range.

## 2025-05-05 ENCOUNTER — HOSPITAL ENCOUNTER (OUTPATIENT)
Dept: NON INVASIVE DIAGNOSTICS | Facility: HOSPITAL | Age: 50
Discharge: HOME | End: 2025-05-05
Attending: INTERNAL MEDICINE
Payer: COMMERCIAL

## 2025-05-05 VITALS
DIASTOLIC BLOOD PRESSURE: 71 MMHG | HEIGHT: 69 IN | HEART RATE: 103 BPM | WEIGHT: 207 LBS | BODY MASS INDEX: 30.66 KG/M2 | SYSTOLIC BLOOD PRESSURE: 120 MMHG

## 2025-05-05 DIAGNOSIS — R00.2 PALPITATIONS: ICD-10-CM

## 2025-05-05 LAB
AORTIC ROOT: 3.6 CM
AORTIC VALVE MEAN VELOCITY: 9.6 M/S
AV LVOT MEAN GRADIENT: 2 MMHG
AV LVOT PEAK GRADIENT: 4 MMHG
AV MEAN PRESS GRAD SYS DOP V1V2: 4 MMHG
AV PEAK GRADIENT: 8 MMHG
AV VELOCITY RATIO: 0.78
AV VMAX SYS DOP: 1.39 M/S
BSA FOR ECHO PROCEDURE: 2.1 M2
DOP CALC AO VTI: 25.16 CM
DOP CALC LVOT PEAK VEL VTI: 19.52 CM
DOP CALC LVOT PEAK VEL: 1.02 M/S
E WAVE DECELERATION TIME: 84 MS
E/A RATIO: 0.91
FRACTIONAL SHORTENING: 40 (ref 28–44)
INTERVENTRICULAR SEPTUM IN DIASTOLE (PARASTERNAL SHORT AXIS VIEW): 0.8 CM
INTERVENTRICULAR SEPTUM: 0.8 CM (ref 0.6–1.1)
LAAS-AP2: 22.3 CM2
LAAS-AP4: 24.9 CM2
LEFT ATRIUM SIZE: 4.1 CM
LEFT ATRIUM VOLUME (MOD BIPLANE): 85 ML
LEFT ATRIUM VOLUME INDEX (MOD BIPLANE): 40.5 ML/M2
LEFT INTERNAL DIMENSION IN SYSTOLE: 3.5 CM (ref 2.1–4)
LEFT VENTRICLE DIASTOLIC VOLUME (MOD BIPLANE): 139 ML
LEFT VENTRICLE DIASTOLIC VOLUME INDEX (MOD BIPLANE): 66.2 ML/M2
LEFT VENTRICLE SYSTOLIC VOLUME (MOD BIPLANE): 71 ML
LEFT VENTRICLE SYSTOLIC VOLUME INDEX (MOD BIPLANE): 33.8 ML/M2
LEFT VENTRICULAR INTERNAL DIMENSION IN DIASTOLE: 5.8 CM (ref 3.5–6)
LEFT VENTRICULAR POSTERIOR WALL IN END DIASTOLE: 0.8 CM
LEFT VENTRICULAR STROKE VOLUME: 112 ML
LV EF BIPLANE MOD: 49 %
LV EF US.2D.A4C+ESTIMATED: 46 %
LVSV (TEICH): 112 ML
MV E'TISSUE VEL-SEP: 9 CM/S
MV PEAK A VEL: 0.81 M/S
MV PEAK E VEL: 74 CM/S
MV STENOSIS PRESSURE HALF TIME: 24 MS
MV VALVE AREA P 1/2 METHOD: 9.17
RIGHT ATRIUM AREA SYSTOLE A4C: 19.9 CM2
RIGHT VENTRICLE ID DIMENSION: 4.2 CM
SL CV LEFT ATRIUM LENGTH A2C: 4.9 CM
SL CV LV EF: 50
SL CV PED ECHO LEFT VENTRICLE DIASTOLIC VOLUME (MOD BIPLANE) 2D: 164 ML
SL CV PED ECHO LEFT VENTRICLE SYSTOLIC VOLUME (MOD BIPLANE) 2D: 52 ML
TR MAX PG: 15 MMHG
TR PEAK VELOCITY: 2 M/S
TRICUSPID ANNULAR PLANE SYSTOLIC EXCURSION: 2.6 CM
TRICUSPID VALVE PEAK REGURGITATION VELOCITY: 1.95 M/S

## 2025-05-05 PROCEDURE — 93306 TTE W/DOPPLER COMPLETE: CPT

## 2025-05-05 PROCEDURE — 93306 TTE W/DOPPLER COMPLETE: CPT | Performed by: INTERNAL MEDICINE

## 2025-05-06 ENCOUNTER — VBI (OUTPATIENT)
Dept: ADMINISTRATIVE | Facility: OTHER | Age: 50
End: 2025-05-06

## 2025-05-06 NOTE — TELEPHONE ENCOUNTER
05/06/25 8:59 AM     Chart reviewed for Diabetic Eye Exam was/were not submitted to the patient's insurance.     Marleny Masterson MA   [facility]

## 2025-05-14 DIAGNOSIS — M79.18 MYOFASCIAL PAIN SYNDROME: ICD-10-CM

## 2025-05-14 DIAGNOSIS — G89.4 CHRONIC PAIN SYNDROME: ICD-10-CM

## 2025-05-16 ENCOUNTER — HOSPITAL ENCOUNTER (OUTPATIENT)
Dept: MRI IMAGING | Facility: HOSPITAL | Age: 50
End: 2025-05-16
Attending: ANESTHESIOLOGY
Payer: COMMERCIAL

## 2025-05-16 DIAGNOSIS — R29.898 LEFT LEG WEAKNESS: ICD-10-CM

## 2025-05-16 DIAGNOSIS — M54.50 CHRONIC BILATERAL LOW BACK PAIN WITHOUT SCIATICA: ICD-10-CM

## 2025-05-16 DIAGNOSIS — G89.29 CHRONIC BILATERAL LOW BACK PAIN WITHOUT SCIATICA: ICD-10-CM

## 2025-05-16 DIAGNOSIS — M54.16 LUMBAR RADICULOPATHY: ICD-10-CM

## 2025-05-16 PROCEDURE — 72148 MRI LUMBAR SPINE W/O DYE: CPT

## 2025-05-21 ENCOUNTER — OFFICE VISIT (OUTPATIENT)
Age: 50
End: 2025-05-21
Payer: COMMERCIAL

## 2025-05-21 VITALS — HEIGHT: 69 IN | WEIGHT: 202 LBS | BODY MASS INDEX: 29.92 KG/M2

## 2025-05-21 DIAGNOSIS — M54.50 CHRONIC BILATERAL LOW BACK PAIN WITHOUT SCIATICA: Primary | ICD-10-CM

## 2025-05-21 DIAGNOSIS — G89.29 CHRONIC BILATERAL LOW BACK PAIN WITHOUT SCIATICA: Primary | ICD-10-CM

## 2025-05-21 DIAGNOSIS — M54.16 LUMBAR RADICULOPATHY: ICD-10-CM

## 2025-05-21 DIAGNOSIS — M79.18 MYOFASCIAL PAIN SYNDROME: ICD-10-CM

## 2025-05-21 DIAGNOSIS — G89.4 CHRONIC PAIN SYNDROME: ICD-10-CM

## 2025-05-21 PROCEDURE — 99214 OFFICE O/P EST MOD 30 MIN: CPT | Performed by: NURSE PRACTITIONER

## 2025-05-21 RX ORDER — CYCLOBENZAPRINE HCL 5 MG
5 TABLET ORAL 3 TIMES DAILY PRN
Qty: 90 TABLET | Refills: 0 | Status: SHIPPED | OUTPATIENT
Start: 2025-05-21
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
## 2025-06-02 ENCOUNTER — EVALUATION (OUTPATIENT)
Dept: PHYSICAL THERAPY | Age: 50
End: 2025-06-02
Payer: COMMERCIAL

## 2025-06-02 DIAGNOSIS — G89.29 CHRONIC BILATERAL LOW BACK PAIN WITHOUT SCIATICA: Primary | ICD-10-CM

## 2025-06-02 DIAGNOSIS — M54.50 CHRONIC BILATERAL LOW BACK PAIN WITHOUT SCIATICA: Primary | ICD-10-CM

## 2025-06-02 PROCEDURE — 97140 MANUAL THERAPY 1/> REGIONS: CPT | Performed by: PHYSICAL THERAPIST

## 2025-06-02 PROCEDURE — 97110 THERAPEUTIC EXERCISES: CPT | Performed by: PHYSICAL THERAPIST

## 2025-06-02 NOTE — PROGRESS NOTES
PT Re-Evaluation     Today's date: 2025  Patient name: Robert Combs  : 1975  MRN: [id number]  Referring provider: Yolanda Billy DO  Dx:   Encounter Diagnosis     ICD-10-CM    1. Chronic bilateral low back pain without sciatica  M54.50     G89.29           Start Time: 0845  Stop Time: 09  Total time in clinic (min): 61 minutes    Assessment  Impairments: abnormal or restricted ROM, activity intolerance, impaired physical strength, lacks appropriate home exercise program, pain with function, poor posture  and poor body mechanics  Symptom irritability: moderate    Assessment details: Robert Combs is a 49 y.o. male who presents with pain, decreased strength, and decreased ROM. Due to these impairments, Patient has difficulty performing a/iadls and recreational activities. Patient's clinical presentation is consistent with their referring diagnosis of chronic LBP. Patient was only seen for 3 visits and now returns for more PT. He was sick and followed up with spine doctor who tried new meds, patient states he can't take the lyrica but still takes the other. He states as long as he is not doing physical things he is ok. He states he recently had vascular surgeries in chest but does not follow any restrictions.  Patient would benefit from skilled physical therapy to address their aforementioned impairments, improve their level of function and to improve their overall quality of life.  Understanding of Dx/Px/POC: good     Prognosis: good    Goals  ST-3 WEEKS  1.  Decrease pain by 2 points on VAS at its worst. Goal not met. Ongoing goal.   2.  Increase ROM by > 5 deg in all deficients planes. Goal not met. Ongoing goal.   3.  Increase core strength by 1/2 MMT grade in all deficient planes. Goal not met. Ongoing goal.     LT-6 WEEKS  1. Patient to be independent with a/iadls. Ongoing goal.   2. Increase functional activities for leisure and home activities to previous LOF. Ongoing goal.   3.  "Independent with HEP and/or fitness program. Ongoing goal.     Plan  Patient would benefit from: skilled physical therapy  Planned modality interventions: cryotherapy, thermotherapy: hydrocollator packs, unattended electrical stimulation and low level laser therapy    Planned therapy interventions: activity modification, behavior modification, body mechanics training, aquatic therapy, flexibility, functional ROM exercises, home exercise program, IADL retraining, joint mobilization, manual therapy, neuromuscular re-education, patient education, postural training, strengthening, stretching, therapeutic activities, therapeutic exercise and abdominal trunk stabilization    Frequency: 2-3x week.  Duration in weeks: 6  Plan of Care beginning date: 3/31/2025  Plan of Care expiration date: 2025  Treatment plan discussed with: patient        Subjective Evaluation    History of Present Illness  Mechanism of injury: Patient has long history of LBP. He c/o difficulty lifting heavy, such as bags of corn of deer and cat litter. He was cutting a tree down this weekend. Patient c/o cramping at night due to \"dehydration\" even though he drank 4 glasses of water during the night. Hx of DM but declines treatment.   25: Patient reports he was sick in April with bronchitis and unable to come to PT. Then he had MRI in May and was recommended he return to PT. He just got back from short vacation. Didn't do any heavy lifting but states when he does feel his back is painful. No radicular symptoms. He feels he is dehydrated also due to \"drinking\" states about 4 cans beer. He is trying to drink more water a day.          Recurrent probem    Patient Goals  Patient goals for therapy: decreased pain, increased strength and return to sport/leisure activities  Patient goal: \"doctor sent me here\"  Pain  Current pain ratin  At worst pain ratin  Location: across LB  Quality: sharp, discomfort, tight and dull ache  Aggravating factors: " lifting  Progression: no change    Social Support  Steps to enter house: yes  Stairs in house: no   Lives in: one-story house  Lives with: significant other    Employment status: not working    Diagnostic Tests  X-ray: normal  MRI studies: normal    FCE comments: Recent Xrays showed some degenerative changes at L5-S1 otherwise unremarkable.   Recent MRI was normal of L/s      Objective     Postural Observations  Seated posture: good  Standing posture: good      Palpation   Left   Hypertonic in the erector spinae and lumbar paraspinals.   Tenderness of the erector spinae and lumbar paraspinals.     Right   Hypertonic in the erector spinae and lumbar paraspinals.   Tenderness of the erector spinae and lumbar paraspinals.     Additional Palpation Details  General pain in flank area and across LB but palpation does not elicit more pain.     Neurological Testing     Sensation     Lumbar   Left   Intact: light touch    Right   Intact: light touch    Reflexes   Left   Patellar (L4): normal (2+)    Right   Patellar (L4): normal (2+)    Active Range of Motion     Lumbar   Flexion: Active lumbar flexion: tips to mid to distal shins.  Restriction level: moderate  Extension: Active lumbar extension: patient states he gets relief to do them.  WFL  Left lateral flexion:  WFL  Right lateral flexion:  WFL    Strength/Myotome Testing     Left Hip   Planes of Motion   Flexion: 4+  Extension: 4+  Abduction: 5  Adduction: 5    Right Hip   Planes of Motion   Flexion: 4+  Extension: 4+  Abduction: 5  Adduction: 5    Left Knee   Flexion: 4+  Extension: 5    Right Knee   Flexion: 5  Extension: 5    Left Ankle/Foot   Dorsiflexion: 5  Plantar flexion: 5    Right Ankle/Foot   Dorsiflexion: 5  Plantar flexion: 5    Additional Strength Details  Core/abdominal: 4-/5    Tests     Lumbar     Left   Negative passive SLR and slump test.     Right   Negative passive SLR and slump test.     Left Hip   Negative JUSTICE and FADIR.     Right Hip   Negative  "JUSTICE and ANETTE.     Ambulation   Weight-Bearing Status   Assistive device used: none    Functional Assessment        Single Leg Stance   Left: 4 seconds  Right: 3 seconds    General Comments:    Lower quarter screen   Knees: unremarkable  Foot/ankle: unremarkable    Hip Comments   Very tight hamstrings: 65° bilateral               POC expires Unit limit Auth Expiration date PT/OT + Visit Limit?   6/30/25 BOMN 6/30 20                           Visit/Unit Tracking  AUTH Status:  Date 3/31 4/3 4/9 6/2           Auth after visit 20 Used 1 2 3 4            Remaining  19 18 17 16            FOTO                 Precautions: DM without meds, Disabled,     Daily Treatment Diary:  Manuals 3/31 4/3 4/9 6/2         LB/LE's, hamst SG   SG                                                Neuro Re-Ed                                       Ther Ex             Hip adduction Ball 30x HEP  30x         Hip abduction 30x  HEP  30x         bridge 10x 2x10  2x10 30x         LTRs  X15 ea  X20 ea 10x         Standing extensions  X20 ea  X20 ea home                      B heel slides 10x  2x10 30x         Ball press standing 10x 3\" 10 x10\"  10x10\" nt         [place] rows, shld extension 30x ea BTB 2x10 20# ext   \"\" 30# Rows  2x10 20# ext 30# rows 20# ext 30# row 30 ea         Slant board nv 4x30\"  4x30\" 4x         Nustep nv 5' L7  7' L5 8' L5                                   Cybex hip add  2x10 40# 2x10 40#  nv         Cybex hip abd  2x10 30#  2x10 30# nv         Multifidus press   Consider nv          Ther Activity                                       Gait Training                                       Modalities                                           "

## 2025-06-04 ENCOUNTER — APPOINTMENT (OUTPATIENT)
Dept: PHYSICAL THERAPY | Age: 50
End: 2025-06-04
Attending: FAMILY MEDICINE
Payer: COMMERCIAL

## 2025-06-11 ENCOUNTER — OFFICE VISIT (OUTPATIENT)
Dept: PHYSICAL THERAPY | Age: 50
End: 2025-06-11
Attending: FAMILY MEDICINE
Payer: COMMERCIAL

## 2025-06-11 DIAGNOSIS — M54.50 CHRONIC BILATERAL LOW BACK PAIN WITHOUT SCIATICA: Primary | ICD-10-CM

## 2025-06-11 DIAGNOSIS — G89.29 CHRONIC BILATERAL LOW BACK PAIN WITHOUT SCIATICA: Primary | ICD-10-CM

## 2025-06-11 PROCEDURE — 97110 THERAPEUTIC EXERCISES: CPT

## 2025-06-11 NOTE — PROGRESS NOTES
"Daily Note     Today's date: 2025  Patient name: Robert Combs  : 1975  MRN: [id number]  Referring provider: Yolanda Billy DO  Dx:   Encounter Diagnosis     ICD-10-CM    1. Chronic bilateral low back pain without sciatica  M54.50     G89.29                      Subjective: Back is doing okay today.       Objective: See treatment diary below      Assessment: Cuing utilized for TA engagement. Added mult press. Patient requires continued vc to facilitate proper form and management of exercise. No sx irritability post session.      Plan: Continue per plan of care.      POC expires Unit limit Auth Expiration date PT/OT + Visit Limit?   25 BOMN  20                           Visit/Unit Tracking  AUTH Status:  Date 3/31 4/3 4/9 6/2 6/11          Auth after visit 20 Used 1 2 3 4 5           Remaining  19 18 17 16 15           FOTO                 Precautions: DM without meds, Disabled,     Daily Treatment Diary:  Manuals 3/31 4/3 4/9 6/2 6/11        LB/LE's, hamst SG   SG JK                                               Neuro Re-Ed                                       Ther Ex             Hip adduction Ball 30x HEP  30x 30x        Hip abduction 30x  HEP  30x 30x        bridge 10x 2x10  2x10 30x 30x        LTRs  X15 ea  X20 ea 10x 10x        Standing extensions  X20 ea  X20 ea home                      B heel slides 10x  2x10 30x 30x        Ball press standing 10x 3\" 10 x10\"  10x10\" nt 10\"x10        [place] rows, shld extension 30x ea BTB 2x10 20# ext   \"\" 30# Rows  2x10 20# ext 30# rows 20# ext 30# row 30 ea 20# ext 30# row 30x        Slant board nv 4x30\"  4x30\" 4x 4x        Nustep nv 5' L7  7' L5 8' L5 8' L5                                  Cybex hip add  2x10 40# 2x10 40#  nv 40# 30x        Cybex hip abd  2x10 30#  2x10 30# nv 30# 30x        Multifidus press   Consider nv  BTB 2x10 ea        Ther Activity                                       Gait Training                                     "   Modalities

## 2025-06-13 ENCOUNTER — OFFICE VISIT (OUTPATIENT)
Dept: PHYSICAL THERAPY | Age: 50
End: 2025-06-13
Attending: FAMILY MEDICINE
Payer: COMMERCIAL

## 2025-06-13 DIAGNOSIS — G89.29 CHRONIC BILATERAL LOW BACK PAIN WITHOUT SCIATICA: Primary | ICD-10-CM

## 2025-06-13 DIAGNOSIS — M54.50 CHRONIC BILATERAL LOW BACK PAIN WITHOUT SCIATICA: Primary | ICD-10-CM

## 2025-06-13 PROCEDURE — 97110 THERAPEUTIC EXERCISES: CPT

## 2025-06-13 NOTE — PROGRESS NOTES
"Daily Note     Today's date: 2025  Patient name: Robert Combs  : 1975  MRN: [id number]  Referring provider: Yolanda Billy DO  Dx:   Encounter Diagnosis     ICD-10-CM    1. Chronic bilateral low back pain without sciatica  M54.50     G89.29                      Subjective: Notes back is doing okay this am, however would like to avoid pushing movements secondary to digestive issues this am, will hold on LE stretching per request.       Objective: See treatment diary below      Assessment: Patient able to complete outlined program with slight modifications on janny. VC utilized for execution of mult press. No sx irritability noted. Core weakness evident, patient would benefit from contined PT to address core strength.       Plan: Continue per plan of care.        POC expires Unit limit Auth Expiration date PT/OT + Visit Limit?   25 BOMN  20                           Visit/Unit Tracking  AUTH Status:  Date 3/31 4/3 4/9 6/2 6/11 6/13         Auth after visit 20 Used 1 2 3 4 5 6          Remaining  19 18 17 16 15 14          FOTO                 Precautions: DM without meds, Disabled,     Daily Treatment Diary:  Manuals 3/31 4/3 4/9 6/2 6/11 6/13       LB/LE's, hamst SG   SG JK JK                                              Neuro Re-Ed                                       Ther Ex             Hip adduction Ball 30x HEP  30x         Hip abduction 30x  HEP  30x         bridge 10x 2x10  2x10 30x 30x        LTRs  X15 ea  X20 ea 10x 10x        Standing extensions  X20 ea  X20 ea home                      B heel slides 10x  2x10 30x 30x 30x       Ball press standing 10x 3\" 10 x10\"  10x10\" nt 10\"x10 10\"x10       Janny rows, shld extension 30x ea BTB 2x10 20# ext   \"\" 30# Rows  2x10 20# ext 30# rows 20# ext 30# row 30 ea 20# ext 30# row 30x 15# ext 20# row 30x       Slant board nv 4x30\"  4x30\" 4x 4x        Nustep nv 5' L7  7' L5 8' L5 8' L5 8' L5                                 Cybex hip add  2x10 40# " 2x10 40#  nv 40# 30x 40# 30x       Cybex hip abd  2x10 30#  2x10 30# nv 30# 30x 30# 30x       Multifidus press   Consider nv  BTB 2x10 ea BTB 2x10 ea       Ther Activity                                       Gait Training                                       Modalities

## 2025-06-18 ENCOUNTER — APPOINTMENT (OUTPATIENT)
Dept: PHYSICAL THERAPY | Age: 50
End: 2025-06-18
Attending: FAMILY MEDICINE
Payer: COMMERCIAL

## 2025-06-18 DIAGNOSIS — G89.29 CHRONIC BILATERAL LOW BACK PAIN WITHOUT SCIATICA: ICD-10-CM

## 2025-06-18 DIAGNOSIS — M54.50 CHRONIC BILATERAL LOW BACK PAIN WITHOUT SCIATICA: ICD-10-CM

## 2025-06-18 DIAGNOSIS — M79.18 MYOFASCIAL PAIN SYNDROME: ICD-10-CM

## 2025-06-18 RX ORDER — CYCLOBENZAPRINE HCL 5 MG
5 TABLET ORAL 3 TIMES DAILY PRN
Qty: 90 TABLET | Refills: 0 | Status: SHIPPED | OUTPATIENT
Start: 2025-06-18

## 2025-06-18 NOTE — PROGRESS NOTES
"Daily Note     Today's date: 2025  Patient name: Robert Combs  : 1975  MRN: [id number]  Referring provider: Yolanda Billy DO  Dx: No diagnosis found.               Subjective: ***      Objective: See treatment diary below      Assessment: Tolerated treatment {Tolerated treatment :}. Patient {assessment:}      Plan: {PLAN:}       POC expires Unit limit Auth Expiration date PT/OT + Visit Limit?   25 BOMN  20                           Visit/Unit Tracking  AUTH Status:  Date 3/31 4/3 4/9 6/2 6/11 6/13         Auth after visit 20 Used 1 2 3 4 5 6          Remaining  19 18 17 16 15 14          FOTO                 Precautions: DM without meds, Disabled,     Daily Treatment Diary:  Manuals 3/31 4/3 4/9 6/2 6/11 6/13       LB/LE's, hamst SG   SG JK JK                                              Neuro Re-Ed                                       Ther Ex             Hip adduction Ball 30x HEP  30x         Hip abduction 30x  HEP  30x         bridge 10x 2x10  2x10 30x 30x        LTRs  X15 ea  X20 ea 10x 10x        Standing extensions  X20 ea  X20 ea home                      B heel slides 10x  2x10 30x 30x 30x       Ball press standing 10x 3\" 10 x10\"  10x10\" nt 10\"x10 10\"x10       [place] rows, shld extension 30x ea BTB 2x10 20# ext   \"\" 30# Rows  2x10 20# ext 30# rows 20# ext 30# row 30 ea 20# ext 30# row 30x 15# ext 20# row 30x       Slant board nv 4x30\"  4x30\" 4x 4x        Nustep nv 5' L7  7' L5 8' L5 8' L5 8' L5                                 Cybex hip add  2x10 40# 2x10 40#  nv 40# 30x 40# 30x       Cybex hip abd  2x10 30#  2x10 30# nv 30# 30x 30# 30x       Multifidus press   Consider nv  BTB 2x10 ea BTB 2x10 ea       Ther Activity                                       Gait Training                                       Modalities                                                    "

## 2025-06-25 ENCOUNTER — OFFICE VISIT (OUTPATIENT)
Dept: PHYSICAL THERAPY | Age: 50
End: 2025-06-25
Attending: FAMILY MEDICINE
Payer: COMMERCIAL

## 2025-06-25 DIAGNOSIS — M54.50 CHRONIC BILATERAL LOW BACK PAIN WITHOUT SCIATICA: Primary | ICD-10-CM

## 2025-06-25 DIAGNOSIS — G89.29 CHRONIC BILATERAL LOW BACK PAIN WITHOUT SCIATICA: Primary | ICD-10-CM

## 2025-06-25 PROCEDURE — 97110 THERAPEUTIC EXERCISES: CPT

## 2025-06-25 NOTE — PROGRESS NOTES
"Daily Note     Today's date: 2025  Patient name: Robert Combs  : 1975  MRN: [id number]  Referring provider: Yolanda Billy DO  Dx:   Encounter Diagnosis     ICD-10-CM    1. Chronic bilateral low back pain without sciatica  M54.50     G89.29                      Subjective: patient notes he had a rough 2 days last week, reports he mixed up his meds, and took his sleeping medication in the morning. Notes he's feeling better today.       Objective: See treatment diary below      Assessment: tolerated treatment fairly well. Stiff guarded movements noted today. Vc to facilitate proper form and dosage t/o TE, vc required for mult press. No c./o post session.      Plan: Continue per plan of care.        POC expires Unit limit Auth Expiration date PT/OT + Visit Limit?   25 BOMN  20                           Visit/Unit Tracking  AUTH Status:  Date 3/31 4/3 4/9 6/2 6/11 6/13 6/25        Auth after visit 20 Used 1 2 3 4 5 6 7         Remaining  19 18 17 16 15 14 13         FOTO                 Precautions: DM without meds, Disabled,     Daily Treatment Diary:  Manuals 3/31 4/3 4/9 6/2 6/11 6/13 6/25      LB/LE's, hamst SG   SG JK  JK                                             Neuro Re-Ed                                       Ther Ex             Hip adduction Ball 30x HEP  30x         Hip abduction 30x  HEP  30x         bridge 10x 2x10  2x10 30x 30x  30x      LTRs  X15 ea  X20 ea 10x 10x  10x      Standing extensions  X20 ea  X20 ea home                      B heel slides 10x  2x10 30x 30x 30x 30x      Ball press standing 10x 3\" 10 x10\"  10x10\" nt 10\"x10 10\"x10 10\"x10      [place]anny rows, shld extension 30x ea BTB 2x10 20# ext   \"\" 30# Rows  2x10 20# ext 30# rows 20# ext 30# row 30 ea 20# ext 30# row 30x 15# ext 20# row 30x 20# row 15# ext 30xea      Slant board nv 4x30\"  4x30\" 4x 4x        Nustep nv 5' L7  7' L5 8' L5 8' L5 8' L5 8' L5                                Cybex hip add  2x10 40# 2x10 40#  nv " 40# 30x 40# 30x 30# 30x      Cybex hip abd  2x10 30#  2x10 30# nv 30# 30x 30# 30x 40# 30x      Multifidus press   Consider nv  BTB 2x10 ea BTB 2x10 ea BTB 20x ea      Ther Activity                                       Gait Training                                       Modalities

## 2025-06-27 ENCOUNTER — APPOINTMENT (OUTPATIENT)
Dept: PHYSICAL THERAPY | Age: 50
End: 2025-06-27
Attending: FAMILY MEDICINE
Payer: COMMERCIAL

## 2025-07-01 ENCOUNTER — OFFICE VISIT (OUTPATIENT)
Dept: PHYSICAL THERAPY | Age: 50
End: 2025-07-01
Attending: FAMILY MEDICINE
Payer: COMMERCIAL

## 2025-07-01 DIAGNOSIS — M54.50 CHRONIC BILATERAL LOW BACK PAIN WITHOUT SCIATICA: Primary | ICD-10-CM

## 2025-07-01 DIAGNOSIS — G89.29 CHRONIC BILATERAL LOW BACK PAIN WITHOUT SCIATICA: Primary | ICD-10-CM

## 2025-07-01 PROCEDURE — 97140 MANUAL THERAPY 1/> REGIONS: CPT

## 2025-07-01 PROCEDURE — 97110 THERAPEUTIC EXERCISES: CPT

## 2025-07-01 NOTE — PROGRESS NOTES
"Daily Note     Today's date: 2025  Patient name: Robert Combs  : 1975  MRN: [id number]  Referring provider: Yolanda Billy DO  Dx:   Encounter Diagnosis     ICD-10-CM    1. Chronic bilateral low back pain without sciatica  M54.50     G89.29           Start Time: 0930  Stop Time: 1015  Total time in clinic (min): 45 minutes    Subjective: Reports no significant changes in pain. Patient has difficulty identifying and describing his pain. Unable to report whether he has pain in both legs or just one. Is compliant with ANIYAH at home. Reports moving logs at home.       Objective: See treatment diary below  Patient was seen 1:1 for 30 min.     Assessment:  Cues for carryover of program and to ensure proper performance of exercises. Tight B hamstrings. Emphasis on core engagement and postural correction t/o.  Patient would benefit from continued PT      Plan: Continue per plan of care.        POC expires Unit limit Auth Expiration date PT/OT + Visit Limit?   25 BOMN  20                           Visit/Unit Tracking  AUTH Status:  Date 3/31 4/3 4/9 6/2 6/11 6/13 6/25 7/1       Auth after visit 20 Used 1 2 3 4 5 6 7 8        Remaining  19 18 17 16 15 14 13 12        FOTO                 Precautions: DM without meds, Disabled,     Daily Treatment Diary:  Manuals 3/31 4/3 4/9 6/2 6/11 6/13 6/25 7     LB/LE's, hamst SG   SG JK  JK JR                                             Neuro Re-Ed                                       Ther Ex             Hip adduction Ball 30x HEP  30x         Hip abduction 30x  HEP  30x         bridge 10x 2x10  2x10 30x 30x  30x 30x     LTRs  X15 ea  X20 ea 10x 10x  10x 10x     Standing extensions  X20 ea  X20 ea home                      B heel slides 10x  2x10 30x 30x 30x 30x 30x     Ball press standing 10x 3\" 10 x10\"  10x10\" nt 10\"x10 10\"x10 10\"x10 10\"x10      [place] rows, shld extension 30x ea BTB 2x10 20# ext   \"\" 30# Rows  2x10 20# ext 30# rows 20# ext 30# row 30 ea 20# " "ext 30# row 30x 15# ext 20# row 30x 20# row 15# ext 30xea 20# row 15# ext 30xea     Slant board nv 4x30\"  4x30\" 4x 4x        Nustep nv 5' L7  7' L5 8' L5 8' L5 8' L5 8' L5 L5 8'                                Cybex hip add  2x10 40# 2x10 40#  nv 40# 30x 40# 30x 30# 30x 30# 30x     Cybex hip abd  2x10 30#  2x10 30# nv 30# 30x 30# 30x 40# 30x 40# 30x      Multifidus press   Consider nv  BTB 2x10 ea BTB 2x10 ea BTB 20x ea BTB 5\"x20 ea      Ther Activity                                       Gait Training                                       Modalities                                                        "

## 2025-07-02 ENCOUNTER — EVALUATION (OUTPATIENT)
Dept: PHYSICAL THERAPY | Age: 50
End: 2025-07-02
Attending: FAMILY MEDICINE
Payer: COMMERCIAL

## 2025-07-02 DIAGNOSIS — M54.50 CHRONIC BILATERAL LOW BACK PAIN WITHOUT SCIATICA: Primary | ICD-10-CM

## 2025-07-02 DIAGNOSIS — G89.29 CHRONIC BILATERAL LOW BACK PAIN WITHOUT SCIATICA: Primary | ICD-10-CM

## 2025-07-02 PROCEDURE — 97140 MANUAL THERAPY 1/> REGIONS: CPT | Performed by: PHYSICAL THERAPIST

## 2025-07-02 PROCEDURE — 97110 THERAPEUTIC EXERCISES: CPT | Performed by: PHYSICAL THERAPIST

## 2025-07-02 NOTE — PROGRESS NOTES
PT Re-Evaluation     Today's date: 2025  Patient name: Robert Combs  : 1975  MRN: [id number]  Referring provider: Yolanda Billy DO  Dx:   Encounter Diagnosis     ICD-10-CM    1. Chronic bilateral low back pain without sciatica  M54.50     G89.29           Start Time: 0735  Stop Time: 0815  Total time in clinic (min): 40 minutes    Assessment  Impairments: abnormal or restricted ROM, activity intolerance, impaired physical strength, lacks appropriate home exercise program, pain with function, poor posture  and poor body mechanics  Symptom irritability: moderate    Assessment details: Robert Combs is a 49 y.o. male who presents with pain, decreased strength, and decreased ROM. Due to these impairments, Patient has difficulty performing a/iadls and recreational activities. Patient's clinical presentation is consistent with their referring diagnosis of chronic LBP. Patient was only seen for 6 visits in the past month. He does feel therapy is helping. He was here yesterday and it is early today and he just woke up. Patient declined to do any TE's today due to early and overdid it yesterday.  Patient would benefit from skilled physical therapy to address their aforementioned impairments, improve their level of function and to improve their overall quality of life.  Understanding of Dx/Px/POC: good     Prognosis: good    Goals  ST-3 WEEKS  1.  Decrease pain by 2 points on VAS at its worst. Goal not met. Ongoing goal.   2.  Increase ROM by > 5 deg in all deficients planes. Goal not met. Ongoing goal.   3.  Increase core strength by 1/2 MMT grade in all deficient planes. Goal not met. Ongoing goal.     LT-6 WEEKS  1. Patient to be independent with a/iadls. Ongoing goal.   2. Increase functional activities for leisure and home activities to previous LOF. Ongoing goal.   3. Independent with HEP and/or fitness program. Ongoing goal.     Plan  Patient would benefit from: skilled physical therapy  Planned  "modality interventions: cryotherapy, thermotherapy: hydrocollator packs, unattended electrical stimulation and low level laser therapy    Planned therapy interventions: activity modification, behavior modification, body mechanics training, aquatic therapy, flexibility, functional ROM exercises, home exercise program, IADL retraining, joint mobilization, manual therapy, neuromuscular re-education, patient education, postural training, strengthening, stretching, therapeutic activities, therapeutic exercise and abdominal trunk stabilization    Frequency: 2-3x week.  Duration in weeks: 6  Plan of Care beginning date: 3/31/2025  Plan of Care expiration date: 8/15/2025  Treatment plan discussed with: patient      Subjective Evaluation    History of Present Illness  Mechanism of injury: Patient has long history of LBP. He c/o difficulty lifting heavy, such as bags of corn of deer and cat litter. He was cutting a tree down this weekend. Patient c/o cramping at night due to \"dehydration\" even though he drank 4 glasses of water during the night. Hx of DM but declines treatment.   25: Patient reports he was sick in April with bronchitis and unable to come to PT. Then he had MRI in May and was recommended he return to PT. He just got back from short vacation. Didn't do any heavy lifting but states when he does feel his back is painful. No radicular symptoms. He feels he is dehydrated also due to \"drinking\" states about 4 cans beer. He is trying to drink more water a day.  25: Patient reports the stretching is helping. He states he has tremors and cramps in legs and hands. His pains in LB still varies from low to high.           Recurrent probem    Patient Goals  Patient goals for therapy: decreased pain, increased strength and return to sport/leisure activities  Patient goal: core strength, stretching  Pain  Current pain ratin  At worst pain ratin  Location: across LB  Quality: sharp, discomfort, tight and dull " ache  Aggravating factors: lifting  Progression: no change    Social Support  Steps to enter house: yes  Stairs in house: no   Lives in: one-story house  Lives with: significant other    Employment status: not working    Diagnostic Tests  X-ray: normal  MRI studies: normal    FCE comments: Recent Xrays showed some degenerative changes at L5-S1 otherwise unremarkable.   Recent MRI was normal of L/sTreatments  Current treatment: physical therapy        Objective     Static Posture     Comments  BP: 123/77  LUE sitting 805am    Postural Observations  Seated posture: good  Standing posture: good      Palpation   Left   Hypertonic in the erector spinae and lumbar paraspinals.   Tenderness of the erector spinae and lumbar paraspinals.     Right   Hypertonic in the erector spinae and lumbar paraspinals.   Tenderness of the erector spinae and lumbar paraspinals.     Additional Palpation Details  General pain in flank area and across LB but palpation does not elicit more pain.     Neurological Testing     Sensation     Lumbar   Left   Intact: light touch    Right   Intact: light touch    Active Range of Motion     Lumbar   Flexion: Active lumbar flexion: tips to mid  to distal shins.  Restriction level: moderate  Extension: Active lumbar extension: patient states he gets relief to do them.  WFL  Left lateral flexion:  WFL  Right lateral flexion:  WFL    Additional Active Range of Motion Details  Patient guarded with PROM and stretching in LE's.     Strength/Myotome Testing     Left Hip   Planes of Motion   Flexion: 4+  Extension: 4+  Abduction: 5  Adduction: 5    Right Hip   Planes of Motion   Flexion: 4+  Extension: 4+  Abduction: 5  Adduction: 5    Left Knee   Flexion: 4+  Extension: 5    Right Knee   Flexion: 5  Extension: 5    Left Ankle/Foot   Dorsiflexion: 5  Plantar flexion: 5    Right Ankle/Foot   Dorsiflexion: 5  Plantar flexion: 5    Additional Strength Details  Core/abdominal: 4/5    Tests     Lumbar  "    Left   Negative passive SLR and slump test.     Right   Negative passive SLR and slump test.     Left Hip   Negative JUSTICE and FADIR.     Right Hip   Negative JUSTICE and FADIR.     Ambulation   Weight-Bearing Status   Assistive device used: none    Ambulation: Level Surfaces   Ambulation without assistive device: independent    Functional Assessment        Single Leg Stance   Left: 4 seconds  Right: 3 (leans to left) seconds    General Comments:    Lower quarter screen   Knees: unremarkable  Foot/ankle: unremarkable    Hip Comments   Very tight hamstrings: 69° bilateral patient states yesterday he was looser               POC expires Unit limit Auth Expiration date PT/OT + Visit Limit?   8/15/25 BOMN 9/19/25 20                           Visit/Unit Tracking  AUTH Status:  Date 3/31 4/3 4/9 6/2 6/11 6/13 6/25 7/1 7/2      Auth after visit 20 Used 1 2 3 4 5 6 7 8 9       Remaining  19 18 17 16 15 14 13 12 11      Re-eval     SG     SG       FOTO 46/59   63/59             Precautions: DM without meds, Disabled,     Daily Treatment Diary:  Manuals 3/31 4/3 4/9 6/2 6/11 6/13 6/25 7/1 7/2    LB/LE's, hamst SG   SG JK  JK JR  SG    B leg pulls         SG                              Neuro Re-Ed                                       Ther Ex                                       bridge 10x 2x10  2x10 30x 30x  30x 30x unable    LTRs  X15 ea  X20 ea 10x 10x  10x 10x 10x    Standing extensions  X20 ea  X20 ea home         Open book         nv    B heel slides 10x  2x10 30x 30x 30x 30x 30x 30x    Ball press standing 10x 3\" 10 x10\"  10x10\" nt 10\"x10 10\"x10 10\"x10 10\"x10  20x 5\"    Janny rows, shld extension 30x ea BTB 2x10 20# ext   \"\" 30# Rows  2x10 20# ext 30# rows 20# ext 30# row 30 ea 20# ext 30# row 30x 15# ext 20# row 30x 20# row 15# ext 30xea 20# row 15# ext 30xea     Slant board nv 4x30\"  4x30\" 4x 4x        Nustep nv 5' L7  7' L5 8' L5 8' L5 8' L5 8' L5 L5 8'                   Leg press             Cybex hip add  2x10 " "40# 2x10 40#  nv 40# 30x 40# 30x 30# 30x 30# 30x     Cybex hip abd  2x10 30#  2x10 30# nv 30# 30x 30# 30x 40# 30x 40# 30x      Multifidus press   Consider nv  BTB 2x10 ea BTB 2x10 ea BTB 20x ea BTB 5\"x20 ea      Ther Activity                                       Gait Training                                       Modalities                                             "

## 2025-07-09 ENCOUNTER — APPOINTMENT (OUTPATIENT)
Dept: PHYSICAL THERAPY | Age: 50
End: 2025-07-09
Attending: FAMILY MEDICINE
Payer: COMMERCIAL

## 2025-07-11 ENCOUNTER — OFFICE VISIT (OUTPATIENT)
Dept: PHYSICAL THERAPY | Age: 50
End: 2025-07-11
Attending: FAMILY MEDICINE
Payer: COMMERCIAL

## 2025-07-11 DIAGNOSIS — G89.29 CHRONIC BILATERAL LOW BACK PAIN WITHOUT SCIATICA: Primary | ICD-10-CM

## 2025-07-11 DIAGNOSIS — M54.50 CHRONIC BILATERAL LOW BACK PAIN WITHOUT SCIATICA: Primary | ICD-10-CM

## 2025-07-11 PROCEDURE — 97110 THERAPEUTIC EXERCISES: CPT

## 2025-07-11 NOTE — PROGRESS NOTES
"Daily Note     Today's date: 2025  Patient name: Robert Combs  : 1975  MRN: [id number]  Referring provider: Yolanda Billy DO  Dx:   Encounter Diagnosis     ICD-10-CM    1. Chronic bilateral low back pain without sciatica  M54.50     G89.29                      Subjective: No new complaints. Reports canceling on wed because he ate too much over the  holiday.       Objective: See treatment diary below      Assessment: No sx irritability. Slow guarded movements throughout clinic. Increased resistance for multifidus press, good tolerance. Cuing utilized t/o for form management.       Plan: Continue per plan of care.      POC expires Unit limit Auth Expiration date PT/OT + Visit Limit?   8/15/25 BOMN 25 20                           Visit/Unit Tracking  AUTH Status:  Date 3/31 4/3 4/9 6/2 6/11 6/13 6/25 7/1 7/2 7/11     Auth after visit 20 Used 1 2 3 4 5 6 7 8 9 10      Remaining  19 18 17 16 15 14 13 12 11 10     Re-eval     SG     SG       FOTO 46   63/59             Precautions: DM without meds, Disabled,     Daily Treatment Diary:  Manuals 3/31 4/3 4/9 6/2 6/11 6/13 6/25 7/1 7/2 7/11   LB/LE's, hamst SG   SG JK  JK JR  SG JK   B leg pulls         SG                              Neuro Re-Ed                                       Ther Ex                                       bridge 10x 2x10  2x10 30x 30x  30x 30x unable NP   LTRs  X15 ea  X20 ea 10x 10x  10x 10x 10x NP   Standing extensions  X20 ea  X20 ea home         Open book         nv    B heel slides 10x  2x10 30x 30x 30x 30x 30x 30x 30x   Ball press standing 10x 3\" 10 x10\"  10x10\" nt 10\"x10 10\"x10 10\"x10 10\"x10  20x 5\" 20x5\"   Janny rows, shld extension 30x ea BTB 2x10 20# ext   \"\" 30# Rows  2x10 20# ext 30# rows 20# ext 30# row 30 ea 20# ext 30# row 30x 15# ext 20# row 30x 20# row 15# ext 30xea 20# row 15# ext 30xea  20# row 15# 20x ea   Slant board nv 4x30\"  4x30\" 4x 4x        Nustep nv 5' L7  7' L5 8' L5 8' L5 8' L5 8' L5 L5 " "8'   L5' 8                Leg press             Cybex hip add  2x10 40# 2x10 40#  nv 40# 30x 40# 30x 30# 30x 30# 30x 40# 30x 40# 30x   Cybex hip abd  2x10 30#  2x10 30# nv 30# 30x 30# 30x 40# 30x 40# 30x  40# 30x 40# 30x   Multifidus press   Consider nv  BTB 2x10 ea BTB 2x10 ea BTB 20x ea BTB 5\"x20 ea  BTB 5\"x 20 Blk 5\" 20x   Ther Activity                                       Gait Training                                       Modalities                                                  "

## 2025-07-14 ENCOUNTER — VBI (OUTPATIENT)
Dept: ADMINISTRATIVE | Facility: OTHER | Age: 50
End: 2025-07-14

## 2025-07-14 NOTE — TELEPHONE ENCOUNTER
07/14/25 9:58 AM     Chart reviewed for Diabetic Eye Exam was/were not submitted to the patient's insurance.     Marleny Masterson MA   [facility]   Unable to reach patient for call back after patient's follow up appointment with PCP.   LVM with call back number for patient to call if needed

## 2025-07-18 ENCOUNTER — OFFICE VISIT (OUTPATIENT)
Dept: PHYSICAL THERAPY | Age: 50
End: 2025-07-18
Attending: FAMILY MEDICINE
Payer: COMMERCIAL

## 2025-07-18 DIAGNOSIS — G89.29 CHRONIC BILATERAL LOW BACK PAIN WITHOUT SCIATICA: Primary | ICD-10-CM

## 2025-07-18 DIAGNOSIS — M54.50 CHRONIC BILATERAL LOW BACK PAIN WITHOUT SCIATICA: Primary | ICD-10-CM

## 2025-07-18 PROCEDURE — 97140 MANUAL THERAPY 1/> REGIONS: CPT

## 2025-07-18 PROCEDURE — 97110 THERAPEUTIC EXERCISES: CPT

## 2025-07-20 DIAGNOSIS — R10.9 ABDOMINAL PAIN, UNSPECIFIED ABDOMINAL LOCATION: ICD-10-CM

## 2025-07-21 RX ORDER — DICYCLOMINE HYDROCHLORIDE 10 MG/1
10 CAPSULE ORAL 4 TIMES DAILY PRN
Qty: 360 CAPSULE | Refills: 1 | Status: SHIPPED | OUTPATIENT
Start: 2025-07-21

## 2025-07-23 ENCOUNTER — APPOINTMENT (OUTPATIENT)
Dept: PHYSICAL THERAPY | Age: 50
End: 2025-07-23
Attending: FAMILY MEDICINE
Payer: COMMERCIAL

## 2025-07-24 NOTE — PROGRESS NOTES
"Daily Note     Today's date: 2025  Patient name: Robert Combs  : 1975  MRN: [id number]  Referring provider: Yolanda Billy DO  Dx:   Encounter Diagnosis     ICD-10-CM    1. Chronic bilateral low back pain without sciatica  M54.50     G89.29                      Subjective: Patient states his back is feeling good today.  Not currently having any pain.      Objective: See treatment diary below      Assessment: Tolerated treatment well. Patient demonstrated fatigue post treatment and would benefit from continued PT.  Max verbal and tactile cues for performance of all TE.      Plan: Continue per plan of care.  Progress treatment as tolerated.         POC expires Unit limit Auth Expiration date PT/OT + Visit Limit?   8/15/25 BOMN 25 20                           Visit/Unit Tracking  AUTH Status:  Date 3/31 4/3 4/9 6/2 6/11 6/13 6/25 7/1 7/2 7/11 7/18 7/25   Auth after visit 20 Used 1 2 3 4 5 6 7 8 9 10 11 12    Remaining  19 18 17 16 15 14 13 12 11 10 9 8   Re-eval     SG     SG       FOTO    63/59             Precautions: DM without meds, Disabled,     Daily Treatment Diary:  Manuals 7/25 4/3 4/9 6/2 6/11 6/13 6/25 7/1 7/2 7/11 7/18   LB/LE's, hamst    SG JK  JK JR  SG JK CM   B leg pulls         SG                                 Neuro Re-Ed                                          Ther Ex                                          bridge  2x10  2x10 30x 30x  30x 30x unable NP NP   LTRs  X15 ea  X20 ea 10x 10x  10x 10x 10x NP 10x   Standing extensions  X20 ea  X20 ea home          Open book         nv     B heel slides   2x10 30x 30x 30x 30x 30x 30x 30x 30x   Ball press standing  10 x10\"  10x10\" nt 10\"x10 10\"x10 10\"x10 10\"x10  20x 5\" 20x5\" 20x5\"   [place] rows, shld extension  2x10 20# ext   \"\" 30# Rows  2x10 20# ext 30# rows 20# ext 30# row 30 ea 20# ext 30# row 30x 15# ext 20# row 30x 20# row 15# ext 30xea 20# row 15# ext 30xea  20# row 15# 20x ea 20#  Row  20#  Ext  20 ea.   Slant board  " "4x30\"  4x30\" 4x 4x         Nustep L5x8' 5' L7  7' L5 8' L5 8' L5 8' L5 8' L5 L5 8'   L5' 8 L5 8'                 Leg press              Cybex hip add 40#  30x 2x10 40# 2x10 40#  nv 40# 30x 40# 30x 30# 30x 30# 30x 40# 30x 40# 30x 40#  30x   Cybex hip abd 40#  30x 2x10 30#  2x10 30# nv 30# 30x 30# 30x 40# 30x 40# 30x  40# 30x 40# 30x 40#  30x   Multifidus press   Consider nv  BTB 2x10 ea BTB 2x10 ea BTB 20x ea BTB 5\"x20 ea  BTB 5\"x 20 Blk 5\" 20x Blk 5\"x20   Ther Activity                                          Gait Training                                          Modalities                                                     "

## 2025-07-25 ENCOUNTER — OFFICE VISIT (OUTPATIENT)
Dept: PHYSICAL THERAPY | Age: 50
End: 2025-07-25
Attending: FAMILY MEDICINE
Payer: COMMERCIAL

## 2025-07-25 DIAGNOSIS — G89.29 CHRONIC BILATERAL LOW BACK PAIN WITHOUT SCIATICA: Primary | ICD-10-CM

## 2025-07-25 DIAGNOSIS — M54.50 CHRONIC BILATERAL LOW BACK PAIN WITHOUT SCIATICA: Primary | ICD-10-CM

## 2025-07-25 PROCEDURE — 97110 THERAPEUTIC EXERCISES: CPT | Performed by: PHYSICAL THERAPIST

## 2025-07-25 PROCEDURE — 97112 NEUROMUSCULAR REEDUCATION: CPT | Performed by: PHYSICAL THERAPIST

## 2025-07-26 ENCOUNTER — NURSE TRIAGE (OUTPATIENT)
Dept: OTHER | Facility: OTHER | Age: 50
End: 2025-07-26

## 2025-07-26 NOTE — TELEPHONE ENCOUNTER
"Regarding: Confusion/ Shakes  ----- Message from Peggy VIZCAINO sent at 7/26/2025  4:55 PM EDT -----  Patient stated, \" I need to schedule an appointment. I have non stop continues shakes, last night I was found outside at 3:00 am and could not figure out where I was. \"    "

## 2025-07-26 NOTE — TELEPHONE ENCOUNTER
"REASON FOR CONVERSATION: Shaking    SYMPTOMS: Patient reports starting with b/l hand shakiness about a year ago. The shakiness has gotten worse. Notes when he eats he drops about 1/2 of his meal on the floor because he shakes so bad. Also feels like he is becoming more forgetful. His significant other found him outside last night about 3am and he couldn't recall why he was outside.    OTHER HEALTH INFORMATION: none    PROTOCOL DISPOSITION: Go to ED Now (or PCP Triage)    CARE ADVICE PROVIDED: Patient was strongly encouraged to be evaluated in the ED however he declined at this time. Wishes to only see PCP. Appt was scheduled for 8/4.    PRACTICE FOLLOW-UP: If there are any cancellations for 7/28 or 7/29 with Dr. Billy please follow up with patient. Thank you. Also is there any bloodwork that can be ordered in the interim?           Reason for Disposition   Patient sounds very sick or weak to the triager    Answer Assessment - Initial Assessment Questions  1. APPEARANCE of MOVEMENT: \"What did the jerking or twitching look like?\" (e.g., body area)      Patient reports that for the past year he has been having shaking in his b/l hands that has progressively gotten worse. Notes every time he eats he drops 1/2 of it on the floor from how bad he is shaking.     2. ONSET: \"When did this start happening?\" (e.g., hours, days, weeks, months ago)      About a year ago    3. DURATION: \"How long does the jerk, twitch, or spasm last?\"      Constant    7. OTHER SYMPTOMS: \"Are there any other symptoms?\" (e.g., fever, headache)      Occasional confusion    Protocols used: Muscle Jerks - Tics - Shudders-Adult-AH    "

## 2025-07-28 ENCOUNTER — OFFICE VISIT (OUTPATIENT)
Dept: PHYSICAL THERAPY | Age: 50
End: 2025-07-28
Attending: FAMILY MEDICINE
Payer: COMMERCIAL

## 2025-07-28 DIAGNOSIS — G89.29 CHRONIC BILATERAL LOW BACK PAIN WITHOUT SCIATICA: Primary | ICD-10-CM

## 2025-07-28 DIAGNOSIS — M54.50 CHRONIC BILATERAL LOW BACK PAIN WITHOUT SCIATICA: Primary | ICD-10-CM

## 2025-07-28 PROCEDURE — 97110 THERAPEUTIC EXERCISES: CPT | Performed by: PHYSICAL THERAPIST

## 2025-07-28 PROCEDURE — 97140 MANUAL THERAPY 1/> REGIONS: CPT | Performed by: PHYSICAL THERAPIST

## 2025-07-28 NOTE — TELEPHONE ENCOUNTER
I spoke with dennise and kemar - he could not come in on Wednesday ( going camping) so he will keep the original appt. They were fine with that

## 2025-08-04 ENCOUNTER — APPOINTMENT (OUTPATIENT)
Dept: LAB | Facility: CLINIC | Age: 50
End: 2025-08-04
Payer: COMMERCIAL

## 2025-08-04 ENCOUNTER — OFFICE VISIT (OUTPATIENT)
Dept: FAMILY MEDICINE CLINIC | Facility: CLINIC | Age: 50
End: 2025-08-04
Payer: COMMERCIAL

## 2025-08-04 VITALS
WEIGHT: 209 LBS | SYSTOLIC BLOOD PRESSURE: 118 MMHG | BODY MASS INDEX: 30.96 KG/M2 | TEMPERATURE: 96.9 F | HEART RATE: 90 BPM | HEIGHT: 69 IN | DIASTOLIC BLOOD PRESSURE: 82 MMHG | OXYGEN SATURATION: 96 %

## 2025-08-04 DIAGNOSIS — R41.0 CONFUSION: ICD-10-CM

## 2025-08-04 DIAGNOSIS — F10.20 ALCOHOL USE DISORDER, SEVERE, DEPENDENCE (HCC): Primary | ICD-10-CM

## 2025-08-04 DIAGNOSIS — E72.20 HYPERAMMONEMIA (HCC): ICD-10-CM

## 2025-08-04 DIAGNOSIS — F10.20 ALCOHOL USE DISORDER, SEVERE, DEPENDENCE (HCC): ICD-10-CM

## 2025-08-04 PROCEDURE — 85025 COMPLETE CBC W/AUTO DIFF WBC: CPT

## 2025-08-04 PROCEDURE — 99214 OFFICE O/P EST MOD 30 MIN: CPT | Performed by: FAMILY MEDICINE

## 2025-08-04 PROCEDURE — 80053 COMPREHEN METABOLIC PANEL: CPT

## 2025-08-04 PROCEDURE — 36415 COLL VENOUS BLD VENIPUNCTURE: CPT

## 2025-08-04 PROCEDURE — 82746 ASSAY OF FOLIC ACID SERUM: CPT

## 2025-08-04 PROCEDURE — 84425 ASSAY OF VITAMIN B-1: CPT

## 2025-08-05 ENCOUNTER — OFFICE VISIT (OUTPATIENT)
Dept: PHYSICAL THERAPY | Age: 50
End: 2025-08-05
Attending: FAMILY MEDICINE
Payer: COMMERCIAL

## 2025-08-05 DIAGNOSIS — M54.50 CHRONIC BILATERAL LOW BACK PAIN WITHOUT SCIATICA: Primary | ICD-10-CM

## 2025-08-05 DIAGNOSIS — G89.29 CHRONIC BILATERAL LOW BACK PAIN WITHOUT SCIATICA: Primary | ICD-10-CM

## 2025-08-05 LAB
ALBUMIN SERPL BCG-MCNC: 2.8 G/DL (ref 3.5–5)
ALP SERPL-CCNC: 108 U/L (ref 34–104)
ALT SERPL W P-5'-P-CCNC: 35 U/L (ref 7–52)
ANION GAP SERPL CALCULATED.3IONS-SCNC: 4 MMOL/L (ref 4–13)
AST SERPL W P-5'-P-CCNC: 74 U/L (ref 13–39)
BASOPHILS # BLD AUTO: 0.03 THOUSANDS/ÂΜL (ref 0–0.1)
BASOPHILS NFR BLD AUTO: 1 % (ref 0–1)
BILIRUB SERPL-MCNC: 5.41 MG/DL (ref 0.2–1)
BUN SERPL-MCNC: 6 MG/DL (ref 5–25)
CALCIUM ALBUM COR SERPL-MCNC: 9.3 MG/DL (ref 8.3–10.1)
CALCIUM SERPL-MCNC: 8.3 MG/DL (ref 8.4–10.2)
CHLORIDE SERPL-SCNC: 104 MMOL/L (ref 96–108)
CO2 SERPL-SCNC: 26 MMOL/L (ref 21–32)
CREAT SERPL-MCNC: 0.65 MG/DL (ref 0.6–1.3)
EOSINOPHIL # BLD AUTO: 0.16 THOUSAND/ÂΜL (ref 0–0.61)
EOSINOPHIL NFR BLD AUTO: 3 % (ref 0–6)
ERYTHROCYTE [DISTWIDTH] IN BLOOD BY AUTOMATED COUNT: 16.5 % (ref 11.6–15.1)
FOLATE SERPL-MCNC: >22.3 NG/ML
GFR SERPL CREATININE-BSD FRML MDRD: 113 ML/MIN/1.73SQ M
GLUCOSE P FAST SERPL-MCNC: 104 MG/DL (ref 65–99)
HCT VFR BLD AUTO: 43.4 % (ref 36.5–49.3)
HGB BLD-MCNC: 14.8 G/DL (ref 12–17)
IMM GRANULOCYTES # BLD AUTO: 0.02 THOUSAND/UL (ref 0–0.2)
IMM GRANULOCYTES NFR BLD AUTO: 0 % (ref 0–2)
LYMPHOCYTES # BLD AUTO: 0.61 THOUSANDS/ÂΜL (ref 0.6–4.47)
LYMPHOCYTES NFR BLD AUTO: 11 % (ref 14–44)
MCH RBC QN AUTO: 33.3 PG (ref 26.8–34.3)
MCHC RBC AUTO-ENTMCNC: 34.1 G/DL (ref 31.4–37.4)
MCV RBC AUTO: 98 FL (ref 82–98)
MONOCYTES # BLD AUTO: 0.97 THOUSAND/ÂΜL (ref 0.17–1.22)
MONOCYTES NFR BLD AUTO: 18 % (ref 4–12)
NEUTROPHILS # BLD AUTO: 3.54 THOUSANDS/ÂΜL (ref 1.85–7.62)
NEUTS SEG NFR BLD AUTO: 67 % (ref 43–75)
NRBC BLD AUTO-RTO: 0 /100 WBCS
PLATELET # BLD AUTO: 55 THOUSANDS/UL (ref 149–390)
PMV BLD AUTO: 10 FL (ref 8.9–12.7)
POTASSIUM SERPL-SCNC: 4.3 MMOL/L (ref 3.5–5.3)
PROT SERPL-MCNC: 7 G/DL (ref 6.4–8.4)
RBC # BLD AUTO: 4.44 MILLION/UL (ref 3.88–5.62)
SODIUM SERPL-SCNC: 134 MMOL/L (ref 135–147)
WBC # BLD AUTO: 5.33 THOUSAND/UL (ref 4.31–10.16)

## 2025-08-05 PROCEDURE — 97140 MANUAL THERAPY 1/> REGIONS: CPT | Performed by: PHYSICAL THERAPIST

## 2025-08-05 PROCEDURE — 97110 THERAPEUTIC EXERCISES: CPT | Performed by: PHYSICAL THERAPIST

## 2025-08-06 ENCOUNTER — APPOINTMENT (OUTPATIENT)
Dept: LAB | Facility: HOSPITAL | Age: 50
End: 2025-08-06
Payer: COMMERCIAL

## 2025-08-06 LAB
AMMONIA PLAS-SCNC: 121 UMOL/L (ref 18–72)
BACTERIA UR QL AUTO: ABNORMAL /HPF
BILIRUB UR QL STRIP: ABNORMAL
CLARITY UR: CLEAR
COLOR UR: YELLOW
GLUCOSE UR STRIP-MCNC: NEGATIVE MG/DL
HGB UR QL STRIP.AUTO: ABNORMAL
KETONES UR STRIP-MCNC: NEGATIVE MG/DL
LEUKOCYTE ESTERASE UR QL STRIP: NEGATIVE
MUCOUS THREADS UR QL AUTO: ABNORMAL
NITRITE UR QL STRIP: NEGATIVE
NON-SQ EPI CELLS URNS QL MICRO: ABNORMAL /HPF
PH UR STRIP.AUTO: 8 [PH]
PROT UR STRIP-MCNC: ABNORMAL MG/DL
RBC #/AREA URNS AUTO: ABNORMAL /HPF
SP GR UR STRIP.AUTO: 1.02 (ref 1–1.03)
UROBILINOGEN UR STRIP-ACNC: <2 MG/DL
WBC #/AREA URNS AUTO: ABNORMAL /HPF

## 2025-08-06 PROCEDURE — 36415 COLL VENOUS BLD VENIPUNCTURE: CPT

## 2025-08-09 LAB — VIT B1 BLD-SCNC: 168.9 NMOL/L (ref 66.5–200)

## 2025-08-12 ENCOUNTER — EVALUATION (OUTPATIENT)
Dept: PHYSICAL THERAPY | Age: 50
End: 2025-08-12
Attending: FAMILY MEDICINE
Payer: COMMERCIAL

## 2025-08-15 ENCOUNTER — NURSE TRIAGE (OUTPATIENT)
Age: 50
End: 2025-08-15

## 2025-08-15 ENCOUNTER — HOSPITAL ENCOUNTER (EMERGENCY)
Facility: HOSPITAL | Age: 50
End: 2025-08-16
Attending: EMERGENCY MEDICINE | Admitting: EMERGENCY MEDICINE
Payer: COMMERCIAL

## 2025-08-15 DIAGNOSIS — R45.851 SUICIDAL IDEATION: Primary | ICD-10-CM

## 2025-08-15 LAB
AMPHETAMINES SERPL QL SCN: NEGATIVE
BARBITURATES UR QL: NEGATIVE
BENZODIAZ UR QL: NEGATIVE
COCAINE UR QL: NEGATIVE
ETHANOL EXG-MCNC: 0.16 MG/DL
ETHANOL EXG-MCNC: 0.19 MG/DL
FENTANYL UR QL SCN: NEGATIVE
HYDROCODONE UR QL SCN: NEGATIVE
METHADONE UR QL: NEGATIVE
OPIATES UR QL SCN: NEGATIVE
OXYCODONE+OXYMORPHONE UR QL SCN: NEGATIVE
PCP UR QL: NEGATIVE
THC UR QL: NEGATIVE

## 2025-08-15 PROCEDURE — 99285 EMERGENCY DEPT VISIT HI MDM: CPT

## 2025-08-15 PROCEDURE — 82075 ASSAY OF BREATH ETHANOL: CPT | Performed by: EMERGENCY MEDICINE

## 2025-08-15 PROCEDURE — 99285 EMERGENCY DEPT VISIT HI MDM: CPT | Performed by: EMERGENCY MEDICINE

## 2025-08-15 PROCEDURE — 80307 DRUG TEST PRSMV CHEM ANLYZR: CPT | Performed by: EMERGENCY MEDICINE

## 2025-08-15 RX ORDER — ACETAMINOPHEN 325 MG/1
500 TABLET ORAL ONCE
Status: DISCONTINUED | OUTPATIENT
Start: 2025-08-15 | End: 2025-08-16 | Stop reason: HOSPADM

## 2025-08-15 RX ORDER — CLONAZEPAM 0.5 MG/1
0.5 TABLET ORAL 3 TIMES DAILY
Status: DISCONTINUED | OUTPATIENT
Start: 2025-08-15 | End: 2025-08-16 | Stop reason: HOSPADM

## 2025-08-15 RX ORDER — PANTOPRAZOLE SODIUM 40 MG/1
40 TABLET, DELAYED RELEASE ORAL ONCE
Status: COMPLETED | OUTPATIENT
Start: 2025-08-15 | End: 2025-08-15

## 2025-08-15 RX ORDER — NICOTINE 21 MG/24HR
21 PATCH, TRANSDERMAL 24 HOURS TRANSDERMAL ONCE
Status: COMPLETED | OUTPATIENT
Start: 2025-08-15 | End: 2025-08-16

## 2025-08-15 RX ORDER — OLANZAPINE 5 MG/1
10 TABLET, ORALLY DISINTEGRATING ORAL ONCE
Status: COMPLETED | OUTPATIENT
Start: 2025-08-15 | End: 2025-08-15

## 2025-08-15 RX ADMIN — OLANZAPINE 10 MG: 5 TABLET, ORALLY DISINTEGRATING ORAL at 22:32

## 2025-08-15 RX ADMIN — CLONAZEPAM 0.5 MG: 0.5 TABLET ORAL at 19:44

## 2025-08-15 RX ADMIN — PANTOPRAZOLE SODIUM 40 MG: 40 TABLET, DELAYED RELEASE ORAL at 23:00

## 2025-08-15 RX ADMIN — NICOTINE 21 MG: 21 PATCH, EXTENDED RELEASE TRANSDERMAL at 19:45

## 2025-08-15 RX ADMIN — TRAZODONE HYDROCHLORIDE 150 MG: 100 TABLET ORAL at 22:59

## 2025-08-16 VITALS
DIASTOLIC BLOOD PRESSURE: 83 MMHG | SYSTOLIC BLOOD PRESSURE: 131 MMHG | BODY MASS INDEX: 32 KG/M2 | OXYGEN SATURATION: 98 % | HEIGHT: 69 IN | TEMPERATURE: 98.3 F | WEIGHT: 216.05 LBS | RESPIRATION RATE: 20 BRPM | HEART RATE: 127 BPM

## 2025-08-16 LAB — ETHANOL EXG-MCNC: 0.09 MG/DL

## 2025-08-16 PROCEDURE — 82075 ASSAY OF BREATH ETHANOL: CPT

## 2025-08-16 RX ORDER — OLANZAPINE 2.5 MG/1
5 TABLET, FILM COATED ORAL
Status: CANCELLED | OUTPATIENT
Start: 2025-08-16

## 2025-08-16 RX ORDER — PROPRANOLOL HCL 20 MG
10 TABLET ORAL EVERY 8 HOURS PRN
Status: CANCELLED | OUTPATIENT
Start: 2025-08-16

## 2025-08-16 RX ORDER — ONDANSETRON 4 MG/1
4 TABLET, ORALLY DISINTEGRATING ORAL ONCE
Status: COMPLETED | OUTPATIENT
Start: 2025-08-16 | End: 2025-08-16

## 2025-08-16 RX ORDER — BENZTROPINE MESYLATE 1 MG/ML
1 INJECTION, SOLUTION INTRAMUSCULAR; INTRAVENOUS
Status: CANCELLED | OUTPATIENT
Start: 2025-08-16

## 2025-08-16 RX ORDER — OLANZAPINE 10 MG/2ML
2.5 INJECTION, POWDER, FOR SOLUTION INTRAMUSCULAR
Status: CANCELLED | OUTPATIENT
Start: 2025-08-16

## 2025-08-16 RX ORDER — IBUPROFEN 600 MG/1
600 TABLET, FILM COATED ORAL EVERY 6 HOURS PRN
Status: CANCELLED | OUTPATIENT
Start: 2025-08-16

## 2025-08-16 RX ORDER — OLANZAPINE 2.5 MG/1
2.5 TABLET, FILM COATED ORAL
Status: CANCELLED | OUTPATIENT
Start: 2025-08-16

## 2025-08-16 RX ORDER — DIPHENHYDRAMINE HYDROCHLORIDE 50 MG/ML
50 INJECTION, SOLUTION INTRAMUSCULAR; INTRAVENOUS EVERY 6 HOURS PRN
Status: CANCELLED | OUTPATIENT
Start: 2025-08-16

## 2025-08-16 RX ORDER — TRAZODONE HYDROCHLORIDE 50 MG/1
50 TABLET ORAL
Status: CANCELLED | OUTPATIENT
Start: 2025-08-16

## 2025-08-16 RX ORDER — HYDROXYZINE HYDROCHLORIDE 25 MG/1
25 TABLET, FILM COATED ORAL
Status: CANCELLED | OUTPATIENT
Start: 2025-08-16

## 2025-08-16 RX ORDER — LORAZEPAM 2 MG/ML
2 INJECTION INTRAMUSCULAR EVERY 6 HOURS PRN
Status: CANCELLED | OUTPATIENT
Start: 2025-08-16

## 2025-08-16 RX ORDER — HYDROXYZINE HYDROCHLORIDE 25 MG/1
50 TABLET, FILM COATED ORAL
Status: CANCELLED | OUTPATIENT
Start: 2025-08-16

## 2025-08-16 RX ORDER — IBUPROFEN 400 MG/1
800 TABLET, FILM COATED ORAL EVERY 8 HOURS PRN
Status: CANCELLED | OUTPATIENT
Start: 2025-08-16

## 2025-08-16 RX ORDER — BISACODYL 10 MG
10 SUPPOSITORY, RECTAL RECTAL DAILY PRN
Status: CANCELLED | OUTPATIENT
Start: 2025-08-16

## 2025-08-16 RX ORDER — POLYETHYLENE GLYCOL 3350 17 G/17G
17 POWDER, FOR SOLUTION ORAL DAILY PRN
Status: CANCELLED | OUTPATIENT
Start: 2025-08-16

## 2025-08-16 RX ORDER — MAGNESIUM HYDROXIDE/ALUMINUM HYDROXICE/SIMETHICONE 120; 1200; 1200 MG/30ML; MG/30ML; MG/30ML
30 SUSPENSION ORAL EVERY 4 HOURS PRN
Status: CANCELLED | OUTPATIENT
Start: 2025-08-16

## 2025-08-16 RX ORDER — OLANZAPINE 10 MG/2ML
5 INJECTION, POWDER, FOR SOLUTION INTRAMUSCULAR
Status: CANCELLED | OUTPATIENT
Start: 2025-08-16

## 2025-08-16 RX ORDER — BENZTROPINE MESYLATE 1 MG/1
1 TABLET ORAL
Status: CANCELLED | OUTPATIENT
Start: 2025-08-16

## 2025-08-16 RX ORDER — AMOXICILLIN 250 MG
1 CAPSULE ORAL DAILY PRN
Status: CANCELLED | OUTPATIENT
Start: 2025-08-16

## 2025-08-16 RX ORDER — IBUPROFEN 400 MG/1
400 TABLET, FILM COATED ORAL EVERY 4 HOURS PRN
Status: CANCELLED | OUTPATIENT
Start: 2025-08-16

## 2025-08-16 RX ORDER — HYDROXYZINE HYDROCHLORIDE 25 MG/1
100 TABLET, FILM COATED ORAL
Status: CANCELLED | OUTPATIENT
Start: 2025-08-16

## 2025-08-16 RX ADMIN — CLONAZEPAM 0.5 MG: 0.5 TABLET ORAL at 15:54

## 2025-08-16 RX ADMIN — ONDANSETRON 4 MG: 4 TABLET, ORALLY DISINTEGRATING ORAL at 17:31

## 2025-08-17 PROBLEM — F33.2 MDD (MAJOR DEPRESSIVE DISORDER), RECURRENT SEVERE, WITHOUT PSYCHOSIS (HCC): Status: ACTIVE | Noted: 2025-08-17

## 2025-08-17 PROBLEM — Z00.8 MEDICAL CLEARANCE FOR PSYCHIATRIC ADMISSION: Status: ACTIVE | Noted: 2019-11-06

## 2025-08-17 PROBLEM — F41.9 ANXIETY: Status: ACTIVE | Noted: 2025-08-17

## 2025-08-19 ENCOUNTER — TELEPHONE (OUTPATIENT)
Age: 50
End: 2025-08-19

## 2025-08-20 DIAGNOSIS — R18.8 ASCITES: ICD-10-CM

## 2025-08-20 RX ORDER — SPIRONOLACTONE 50 MG/1
50 TABLET, FILM COATED ORAL DAILY
Qty: 30 TABLET | Refills: 5 | Status: SHIPPED | OUTPATIENT
Start: 2025-08-20

## (undated) DEVICE — NEPTUNE E-SEP SMOKE EVACUATION PENCIL, COATED, 70MM BLADE, PUSH BUTTON SWITCH: Brand: NEPTUNE E-SEP

## (undated) DEVICE — STOCKINETTE REGULAR

## (undated) DEVICE — DISPOSABLE EQUIPMENT COVER: Brand: SMALL TOWEL DRAPE

## (undated) DEVICE — UNIVERSAL MAJOR EXTREMITY,KIT: Brand: CARDINAL HEALTH

## (undated) DEVICE — ELECTRODE LAP L WIRE E-Z CLEAN 33CM -0100

## (undated) DEVICE — TROCAR: Brand: KII® SLEEVE

## (undated) DEVICE — TROCAR SITE CLOSURE DEVICE: Brand: ENDO CLOSE

## (undated) DEVICE — SUT NUROLON 0 CTX C551D

## (undated) DEVICE — SUT VICRYL PLUS 0 CTB-1 27 IN VCPB260H

## (undated) DEVICE — GLOVE SRG BIOGEL 7.5

## (undated) DEVICE — ABDOMINAL PAD: Brand: DERMACEA

## (undated) DEVICE — 3M™ STERI-STRIP™ REINFORCED ADHESIVE SKIN CLOSURES, R1546, 1/4 IN X 4 IN (6 MM X 100 MM), 10 STRIPS/ENVELOPE: Brand: 3M™ STERI-STRIP™

## (undated) DEVICE — 4-PORT MANIFOLD: Brand: NEPTUNE 2

## (undated) DEVICE — 3M™ STERI-STRIP™ REINFORCED ADHESIVE SKIN CLOSURES, R1542, 1/4 IN X 1-1/2 IN (6 MM X 38 MM), 6 STRIPS/ENVELOPE: Brand: 3M™ STERI-STRIP™

## (undated) DEVICE — SUT VICRYL 4-0 PS-2 27 IN J426H

## (undated) DEVICE — DRAPE EQUIPMENT RF WAND

## (undated) DEVICE — METZENBAUM ADTEC SINGLE USE DISSECTING SCISSORS, SHAFT ONLY, MONOPOLAR, CURVED TO LEFT, WORKING LENGTH: 12 1/4", (310 MM), DIAM. 5 MM, INSULATED, DOUBLE ACTION, STERILE, DISPOSABLE, PACKAGE OF 10 PIECES: Brand: AESCULAP

## (undated) DEVICE — TISSUE RETRIEVAL SYSTEM: Brand: INZII RETRIEVAL SYSTEM

## (undated) DEVICE — PAD GROUNDING ADULT

## (undated) DEVICE — CUFF TOURNIQUET 18 X 4 IN QUICK CONNECT DISP 1 BLADDER

## (undated) DEVICE — ACE WRAP 4 IN STERILE

## (undated) DEVICE — PLUMEPEN PRO 10FT

## (undated) DEVICE — INTENDED FOR TISSUE SEPARATION, AND OTHER PROCEDURES THAT REQUIRE A SHARP SURGICAL BLADE TO PUNCTURE OR CUT.: Brand: BARD-PARKER SAFETY BLADES SIZE 15, STERILE

## (undated) DEVICE — [HIGH FLOW INSUFFLATOR,  DO NOT USE IF PACKAGE IS DAMAGED,  KEEP DRY,  KEEP AWAY FROM SUNLIGHT,  PROTECT FROM HEAT AND RADIOACTIVE SOURCES.]: Brand: PNEUMOSURE

## (undated) DEVICE — ENDOPATH PNEUMONEEDLE INSUFFLATION NEEDLES WITH LUER LOCK CONNECTORS 120MM: Brand: ENDOPATH

## (undated) DEVICE — LIGHT HANDLE COVER SLEEVE DISP BLUE STELLAR

## (undated) DEVICE — TROCAR: Brand: KII FIOS FIRST ENTRY

## (undated) DEVICE — CHLORAPREP HI-LITE 26ML ORANGE

## (undated) DEVICE — ALLENTOWN LAP CHOLE APP PACK: Brand: CARDINAL HEALTH

## (undated) DEVICE — GLOVE INDICATOR PI UNDERGLOVE SZ 8 BLUE

## (undated) DEVICE — STERILE BETHLEHEM PLASTIC HAND: Brand: CARDINAL HEALTH

## (undated) DEVICE — GLOVE INDICATOR PI UNDERGLOVE SZ 7.5 BLUE

## (undated) DEVICE — SPONGE PVP SCRUB WING STERILE

## (undated) DEVICE — CAST PADDING 4 IN STERILE

## (undated) DEVICE — SUT VICRYL 3-0 SH 27 IN J416H

## (undated) DEVICE — TUBING SMOKE EVAC W/FILTRATION DEVICE PLUMEPORT ACTIV

## (undated) DEVICE — HYDROPHILIC WOUND DRESSING WITH ZINC PLUS VITAMINS A AND B6.: Brand: DERMAGRAN®-B

## (undated) DEVICE — PENCIL ELECTROSURG E-Z CLEAN -0035H

## (undated) DEVICE — MEDI-VAC YANK SUCT HNDL W/TPRD BULBOUS TIP: Brand: CARDINAL HEALTH

## (undated) DEVICE — SUT MONOCRYL 4-0 PS-2 18 IN Y496G

## (undated) DEVICE — TUBING SUCTION 5MM X 12 FT

## (undated) DEVICE — DRESSING EXUDERM SATIN HYDROCOLLOID 4 X 4 IN

## (undated) DEVICE — OCCLUSIVE GAUZE STRIP,3% BISMUTH TRIBROMOPHENATE IN PETROLATUM BLEND: Brand: XEROFORM

## (undated) DEVICE — 3M™ TEGADERM™ TRANSPARENT FILM DRESSING FRAME STYLE, 1624W, 2-3/8 IN X 2-3/4 IN (6 CM X 7 CM), 100/CT 4CT/CASE: Brand: 3M™ TEGADERM™

## (undated) DEVICE — GAUZE SPONGES,8 PLY: Brand: CURITY

## (undated) DEVICE — GLOVE SRG BIOGEL ECLIPSE 7.5